# Patient Record
Sex: MALE | Race: WHITE | NOT HISPANIC OR LATINO | Employment: OTHER | URBAN - NONMETROPOLITAN AREA
[De-identification: names, ages, dates, MRNs, and addresses within clinical notes are randomized per-mention and may not be internally consistent; named-entity substitution may affect disease eponyms.]

---

## 2017-01-10 RX ORDER — DILTIAZEM HYDROCHLORIDE 180 MG/1
180 CAPSULE, COATED, EXTENDED RELEASE ORAL DAILY
Qty: 90 CAPSULE | Refills: 1 | Status: SHIPPED | OUTPATIENT
Start: 2017-01-10 | End: 2017-04-07 | Stop reason: SDUPTHER

## 2017-02-06 ENCOUNTER — OFFICE VISIT (OUTPATIENT)
Dept: FAMILY MEDICINE CLINIC | Facility: CLINIC | Age: 77
End: 2017-02-06

## 2017-02-06 VITALS
BODY MASS INDEX: 30.98 KG/M2 | OXYGEN SATURATION: 98 % | WEIGHT: 204.4 LBS | HEIGHT: 68 IN | HEART RATE: 74 BPM | DIASTOLIC BLOOD PRESSURE: 80 MMHG | TEMPERATURE: 97 F | SYSTOLIC BLOOD PRESSURE: 160 MMHG

## 2017-02-06 DIAGNOSIS — I10 ESSENTIAL HYPERTENSION: ICD-10-CM

## 2017-02-06 DIAGNOSIS — L30.9 DERMATITIS: Primary | ICD-10-CM

## 2017-02-06 PROCEDURE — 99213 OFFICE O/P EST LOW 20 MIN: CPT | Performed by: FAMILY MEDICINE

## 2017-02-06 RX ORDER — TRIAMCINOLONE ACETONIDE 5 MG/G
OINTMENT TOPICAL 2 TIMES DAILY
Qty: 1 TUBE | Refills: 0 | Status: SHIPPED | OUTPATIENT
Start: 2017-02-06 | End: 2017-02-06

## 2017-02-06 NOTE — PROGRESS NOTES
Subjective   Chief Complaint   Patient presents with   • spots on legs and hips       Xander Wallace is a 76 y.o. male who presents for spots on legs and hips   History of Present Illness:   Rash:  Rash x 3-4 days on both legs:  Had this in the past few years ago and was given nystatin-triamcinolone cream which got rid of it. He has been using that for a few days and it is now not working. It is on both legs and buttock area. It does not itch. Not painful. No discharge or drainage. He does not have any hx of IBS, celiacs, hepatitis. He has not used any new skin products, detergents, creams. No new medications.    BP:  Not checking it at home. He did take med today.  Says that he rushed to get here and was stressed about it. Rushed here from [place].    The following portions of the patient's history were reviewed and updated as appropriate:problem list, current medications, allergies, past family history, past medical history, past social history and past surgical history    Past Medical History   Diagnosis Date   • Asthma    • COPD (chronic obstructive pulmonary disease)    • Coronary artery disease involving coronary bypass graft      CABG 2012, stent 2016, stent 2005   • Emphysema of lung    • Hyperlipidemia    • Hypertension    • Seizure disorder    • Skin cancer of lip    • Sleep apnea        Social History   Substance Use Topics   • Smoking status: Former Smoker   • Smokeless tobacco: Never Used   • Alcohol use No       Medications:    Current Outpatient Prescriptions:   •  aspirin 81 MG tablet, Take 1 tablet by mouth Daily., Disp: 30 tablet, Rfl: 11  •  B Complex-Biotin-FA (SUPER B-COMPLEX) tablet, Take  by mouth., Disp: , Rfl:   •  clopidogrel (PLAVIX) 75 MG tablet, Take 75 mg by mouth daily., Disp: , Rfl:   •  diltiaZEM CD (CARDIZEM CD) 180 MG 24 hr capsule, Take 1 capsule by mouth Daily., Disp: 90 capsule, Rfl: 1  •  DULERA 200-5 MCG/ACT inhaler, , Disp: , Rfl:   •  ezetimibe-simvastatin  "(VYTORIN) 10-40 MG per tablet, Take 1 tablet by mouth Every Night., Disp: 90 tablet, Rfl: 3  •  gabapentin (NEURONTIN) 600 MG tablet, Take 600 mg by mouth 3 (three) times a day., Disp: , Rfl:   •  ipratropium (ATROVENT HFA) 17 MCG/ACT inhaler, Inhale 2 puffs 4 (four) times a day., Disp: , Rfl:   •  ipratropium (ATROVENT) 0.02 % nebulizer solution, Take 500 mcg by nebulization 4 (four) times a day., Disp: , Rfl:   •  lamoTRIgine (LaMICtal) 200 MG tablet, Take 200 mg by mouth daily., Disp: , Rfl:   •  lisinopril (PRINIVIL,ZESTRIL) 20 MG tablet, Take 20 mg by mouth daily., Disp: , Rfl:   •  nystatin-triamcinolone (MYCOLOG II) 340283-8.1 UNIT/GM-% cream, Apply 1 application topically 2 (Two) Times a Day., Disp: , Rfl:   •  Omega-3 Fatty Acids (FISH OIL) 1000 MG capsule capsule, Take  by mouth daily with breakfast., Disp: , Rfl:   •  tamsulosin (FLOMAX) 0.4 MG capsule 24 hr capsule, Take 1 capsule by mouth every night., Disp: , Rfl:   •  VENTOLIN  (90 BASE) MCG/ACT inhaler, , Disp: , Rfl:     Allergies   Allergen Reactions   • Amoxicillin    • Ceftin [Cefuroxime]    • Methylphenidate Derivatives    • Nystatin    • Sulfur    • Theophyllines        Review of Systems   Constitutional: Negative for fatigue and fever.   Respiratory: Negative for shortness of breath.    Cardiovascular: Negative for chest pain and leg swelling.   Skin: Positive for rash.   Neurological: Negative for headaches.       Objective   Visit Vitals   • /80   • Pulse 74   • Temp 97 °F (36.1 °C)   • Ht 68\" (172.7 cm)   • Wt 204 lb 6.4 oz (92.7 kg)   • SpO2 98%   • BMI 31.08 kg/m2       Physical Exam   Constitutional: He appears well-developed and well-nourished. No distress.   Pulmonary/Chest: Effort normal.   Skin: Skin is warm and dry. He is not diaphoretic.   Macular rash in clusters on bilateral legs and buttock area. No scaling. No vesicles or pustules. No discharge or drainage.    Nursing note and vitals reviewed.      Assessment/Plan "   Xander Wallace is a 76 y.o. male seen today for the followin. Dermatitis  Trial of Triamcinolone ointment.  If not resolved with topical triamcinolone, advised him to see Derm    2. Essential hypertension  Likely elevated due to being stressed; however, was elevated at last visit as well. Will have him check it at home and call if >140/90    Follow up: Return in about 3 months (around 2017) for Follow up Blood Pressure.          This document has been electronically signed by Leidy Stanford DO on 2017 2:27 PM

## 2017-02-06 NOTE — PATIENT INSTRUCTIONS
Please check your blood pressure at home 1-2 times per day. It should be < 140/90. Please call me if it is consistently >140/90.  If your rash does not resolve in 1-2 weeks, you see Dr. Gonzalez.

## 2017-02-09 ENCOUNTER — TELEPHONE (OUTPATIENT)
Dept: FAMILY MEDICINE CLINIC | Facility: CLINIC | Age: 77
End: 2017-02-09

## 2017-02-09 DIAGNOSIS — L30.9 DERMATITIS: ICD-10-CM

## 2017-02-09 NOTE — TELEPHONE ENCOUNTER
----- Message from Aida Tuttle sent at 2/9/2017  1:08 PM CST -----  Regarding: med refill  Contact: [phone]  Patient is needing a refill on tiramcinolone 0.5% cream. His wife said the last tube only lasted 3 days. They use [facility] in [place].

## 2017-04-07 RX ORDER — DILTIAZEM HYDROCHLORIDE 180 MG/1
180 CAPSULE, COATED, EXTENDED RELEASE ORAL DAILY
Qty: 90 CAPSULE | Refills: 3 | Status: SHIPPED | OUTPATIENT
Start: 2017-04-07 | End: 2018-04-16 | Stop reason: SDUPTHER

## 2017-04-10 ENCOUNTER — OFFICE VISIT (OUTPATIENT)
Dept: CARDIOLOGY | Facility: CLINIC | Age: 77
End: 2017-04-10

## 2017-04-10 VITALS
SYSTOLIC BLOOD PRESSURE: 120 MMHG | HEIGHT: 68 IN | HEART RATE: 74 BPM | BODY MASS INDEX: 30.46 KG/M2 | WEIGHT: 201 LBS | DIASTOLIC BLOOD PRESSURE: 70 MMHG

## 2017-04-10 DIAGNOSIS — I25.708 CORONARY ARTERY DISEASE INVOLVING CORONARY BYPASS GRAFT OF NATIVE HEART WITH OTHER FORMS OF ANGINA PECTORIS (HCC): ICD-10-CM

## 2017-04-10 DIAGNOSIS — I73.9 PERIPHERAL ARTERIAL DISEASE (HCC): Primary | ICD-10-CM

## 2017-04-10 DIAGNOSIS — E78.2 MIXED HYPERLIPIDEMIA: ICD-10-CM

## 2017-04-10 DIAGNOSIS — I10 ESSENTIAL HYPERTENSION: ICD-10-CM

## 2017-04-10 PROCEDURE — 99214 OFFICE O/P EST MOD 30 MIN: CPT | Performed by: INTERNAL MEDICINE

## 2017-04-10 NOTE — PROGRESS NOTES
[facility] Cardiology  OFFICE NOTE    Xander Wallace  76 y.o. male    04/10/2017  1. Peripheral arterial disease    2. Coronary artery disease involving coronary bypass graft of native heart with other forms of angina pectoris    3. Essential hypertension    4. Mixed hyperlipidemia        Chief complaint -follow-up CAD      History of present Illness- 76 yr old gentleman with history of coronary disease prior CABG in the past and it has history of peripheral vascular disease with total occlusion of the SFA.  He has claudication pain but does not want to do any PT at this time and he will continue to walk as much as he can.  He denies any chest pain.  He has shortness of breath secondary to COPD and is seeing a pulmonologist in [place].  He is on multiple inhalers.  He denies any TIA symptoms of GI symptoms.              Allergies   Allergen Reactions   • Amoxicillin    • Ceftin [Cefuroxime]    • Methylphenidate Derivatives    • Nystatin    • Sulfur    • Theophyllines          Past Medical History:   Diagnosis Date   • Asthma    • COPD (chronic obstructive pulmonary disease)    • Coronary artery disease involving coronary bypass graft     CABG 2012, stent 2016, stent 2005   • Emphysema of lung    • Hyperlipidemia    • Hypertension    • Seizure disorder    • Skin cancer of lip    • Sleep apnea          Past Surgical History:   Procedure Laterality Date   • APPENDECTOMY     • CARDIAC SURGERY      CABG 2012   • CAROTID STENT     • COLON SURGERY     • CORONARY ARTERY BYPASS GRAFT     • HEMORRHOIDECTOMY     • HERNIA REPAIR           Family History   Problem Relation Age of Onset   • Cancer Father          Social History     Social History   • Marital status:      Spouse name: N/A   • Number of children: N/A   • Years of education: N/A     Occupational History   • Not on file.     Social History Main Topics   • Smoking status: Former Smoker   • Smokeless tobacco: Never Used   •  Alcohol use No   • Drug use: No   • Sexual activity: Defer     Other Topics Concern   • Not on file     Social History Narrative         Current Outpatient Prescriptions   Medication Sig Dispense Refill   • B Complex-Biotin-FA (SUPER B-COMPLEX) tablet Take  by mouth.     • clopidogrel (PLAVIX) 75 MG tablet Take 75 mg by mouth daily.     • diltiaZEM CD (CARDIZEM CD) 180 MG 24 hr capsule Take 1 capsule by mouth Daily. 90 capsule 3   • DULERA 200-5 MCG/ACT inhaler      • ezetimibe-simvastatin (VYTORIN) 10-40 MG per tablet Take 1 tablet by mouth Every Night. 90 tablet 3   • gabapentin (NEURONTIN) 600 MG tablet Take 600 mg by mouth 3 (three) times a day.     • ipratropium (ATROVENT HFA) 17 MCG/ACT inhaler Inhale 2 puffs 4 (four) times a day.     • ipratropium (ATROVENT) 0.02 % nebulizer solution Take 500 mcg by nebulization 4 (four) times a day.     • lamoTRIgine (LaMICtal) 200 MG tablet Take 200 mg by mouth daily.     • lisinopril (PRINIVIL,ZESTRIL) 20 MG tablet Take 20 mg by mouth daily.     • Omega-3 Fatty Acids (FISH OIL) 1000 MG capsule capsule Take  by mouth daily with breakfast.     • tamsulosin (FLOMAX) 0.4 MG capsule 24 hr capsule Take 1 capsule by mouth every night.     • triamcinolone (KENALOG) 0.1 % ointment Apply  topically 2 (Two) Times a Day. For up to 2 weeks 1 tube 0   • VENTOLIN  (90 BASE) MCG/ACT inhaler        No current facility-administered medications for this visit.          Review of Systems     Constitution: Denies any fatigue, fever or chills    HENT: Denies any headache, has hearing impairment and uses hearing aids    Eyes: Denies any blurring of vision, or photophobia     Cardivascular - As per history of present illness     Respiratory system-history of COPD and emphysema     Endocrine: Hyperlipidemia    Musculoskeletal:   history of arthritis with musculoskeletal problems    Gastrointestinal: No nausea, vomiting, or melena    Genitourinary: No dysuria or hematuria    Neurological:  "  No history of seizure disorder, stroke, memory problems    Psychiatric/Behavioral:        No history of depression,  No history of bipolar disorder or schizophrenia     Hematological- easy bruising due to dual antiplatelet therapy            OBJECTIVE    /70  Pulse 74  Ht 68\" (172.7 cm)  Wt 201 lb (91.2 kg)  BMI 30.56 kg/m2      Physical Exam     Constitutional: is oriented to person, place, and time.     Skin-warm and dry, diffuse bruising of the hands    Well developed and nourished in no acute distress      Head: Normocephalic and atraumatic.     Eyes: Pupils are equal, round, and reactive to light.     Neck: Neck supple. No bruit in the carotids, no elevation of JVD    Cardiovascular: [place] in the fifth intercostal space   Regular rate, and  Rhythm,    S1 greater than S2, no S3 or S4, no gallop     Pulmonary/Chest:   Air  Entry is equal on both sides  Bilateral diffuse scattered rhonchi      Abdominal: Soft.  No hepatosplenomegaly, bowel sounds are present    Musculoskeletal: No kyphoscoliosis, no significant thickening of the joints    Neurological: is alert and oriented to person, place, and time.    cranial nerve are intact .   No motor or sensory deficit    Extremities-no edema, pulses are weak below the femoral artery on both sides      Psychiatric: He has a normal mood and affect.                  His behavior is normal.           Procedures      Lab Results   Component Value Date    WBC 6.1 08/23/2016    HGB 10.4 (L) 08/23/2016    HCT 31.2 (L) 08/23/2016    MCV 86.0 08/23/2016     08/23/2016     Lab Results   Component Value Date    GLUCOSE 93 08/23/2016    BUN 29 (H) 08/23/2016    CREATININE 1.5 (H) 08/23/2016    CO2 25 08/23/2016    CALCIUM 9.1 08/23/2016    ALBUMIN 4.1 08/23/2016    AST 54 08/23/2016    ALT 28 08/23/2016     No results found for: CHOL  Lab Results   Component Value Date    TRIG 63 05/04/2016     Lab Results   Component Value Date    HDL 51 (L) 05/04/2016     Lab " Results   Component Value Date    LDLCALC 50 05/04/2016     No results found for: LDL  No results found for: HDLLDLRATIO  No components found for: CHOLHDL  Lab Results   Component Value Date    HGBA1C 5.3 05/04/2016     Lab Results   Component Value Date    TSH 2.87 02/23/2016                  A/P    CAD status post CABG in the past, doing well no chest pain stopped his aspirin and continue the Plavix as he is bruising easily.  He is on Cardizem CD for blood pressure as he cannot tolerate beta blockers due to wheezing.    Peripheral vascular disease-status post occlusion of the SFA has no critical limb ischemia, still walking but does have claudication I asked him to walk more.    Hypertension well controlled with lisinopril and Cardizem CD and takes Vytorin for hyperlipidemia.    COPD with wheezing on Atrovent and Dulera .  He will have his labs checked in August by his family doctor and follow-up in 8 months            This document has been electronically signed by Branden Corral MD on April 10, 2017 8:25 AM       EMR Dragon/Transcription disclaimer:   Some of this note may be an electronic transcription/translation of spoken language to printed text. The electronic translation of spoken language may permit erroneous, or at times, nonsensical words or phrases to be inadvertently transcribed; Although I have reviewed the note for such errors, some may still exist.

## 2017-05-01 RX ORDER — LISINOPRIL 20 MG/1
20 TABLET ORAL DAILY
Qty: 90 TABLET | Refills: 1 | Status: SHIPPED | OUTPATIENT
Start: 2017-05-01 | End: 2017-10-25 | Stop reason: SDUPTHER

## 2017-05-01 RX ORDER — CLOPIDOGREL BISULFATE 75 MG/1
75 TABLET ORAL DAILY
Qty: 90 TABLET | Refills: 1 | Status: SHIPPED | OUTPATIENT
Start: 2017-05-01 | End: 2017-11-13 | Stop reason: SDUPTHER

## 2017-05-02 ENCOUNTER — TELEPHONE (OUTPATIENT)
Dept: CARDIOLOGY | Facility: CLINIC | Age: 77
End: 2017-05-02

## 2017-05-05 ENCOUNTER — TELEPHONE (OUTPATIENT)
Dept: FAMILY MEDICINE CLINIC | Facility: CLINIC | Age: 77
End: 2017-05-05

## 2017-05-05 RX ORDER — ALBUTEROL SULFATE 90 UG/1
1 AEROSOL, METERED RESPIRATORY (INHALATION) EVERY 4 HOURS PRN
Qty: 1 INHALER | Refills: 11 | Status: SHIPPED | OUTPATIENT
Start: 2017-05-05 | End: 2017-06-04

## 2017-06-19 ENCOUNTER — OFFICE VISIT (OUTPATIENT)
Dept: FAMILY MEDICINE CLINIC | Facility: CLINIC | Age: 77
End: 2017-06-19

## 2017-06-19 VITALS
DIASTOLIC BLOOD PRESSURE: 56 MMHG | SYSTOLIC BLOOD PRESSURE: 148 MMHG | BODY MASS INDEX: 31.73 KG/M2 | OXYGEN SATURATION: 95 % | HEART RATE: 67 BPM | HEIGHT: 66 IN | WEIGHT: 197.44 LBS

## 2017-06-19 DIAGNOSIS — I25.810 CORONARY ARTERY DISEASE INVOLVING CORONARY BYPASS GRAFT OF NATIVE HEART WITHOUT ANGINA PECTORIS: ICD-10-CM

## 2017-06-19 DIAGNOSIS — E78.2 MIXED HYPERLIPIDEMIA: ICD-10-CM

## 2017-06-19 DIAGNOSIS — Z23 NEED FOR STREPTOCOCCUS PNEUMONIAE VACCINATION: ICD-10-CM

## 2017-06-19 DIAGNOSIS — I10 ESSENTIAL HYPERTENSION: Primary | ICD-10-CM

## 2017-06-19 DIAGNOSIS — J41.8 MIXED SIMPLE AND MUCOPURULENT CHRONIC BRONCHITIS (HCC): ICD-10-CM

## 2017-06-19 PROCEDURE — G0009 ADMIN PNEUMOCOCCAL VACCINE: HCPCS | Performed by: FAMILY MEDICINE

## 2017-06-19 PROCEDURE — 90670 PCV13 VACCINE IM: CPT | Performed by: FAMILY MEDICINE

## 2017-06-19 PROCEDURE — 99214 OFFICE O/P EST MOD 30 MIN: CPT | Performed by: FAMILY MEDICINE

## 2017-06-19 RX ORDER — ALBUTEROL SULFATE 90 UG/1
2 AEROSOL, METERED RESPIRATORY (INHALATION) EVERY 4 HOURS PRN
Qty: 3 INHALER | Refills: 3 | Status: SHIPPED | OUTPATIENT
Start: 2017-06-19 | End: 2018-11-05 | Stop reason: SDUPTHER

## 2017-06-19 RX ORDER — TRIAMCINOLONE ACETONIDE 1 MG/G
CREAM TOPICAL
COMMUNITY
Start: 2017-03-29 | End: 2017-06-19

## 2017-06-19 NOTE — PROGRESS NOTES
Subjective   Chief Complaint   Patient presents with   • Hypertension   • Hyperlipidemia   • COPD   • Follow-up     6 Month        Xander Wallace is a 77 y.o. male who presents for Hypertension; Hyperlipidemia; COPD; and Follow-up (6 Month )   Hypertension   This is a chronic problem. The current episode started more than 1 year ago. The problem is controlled. Pertinent negatives include no chest pain, headaches, palpitations, peripheral edema or shortness of breath. There are no associated agents to hypertension. The current treatment provides significant improvement. There are no compliance problems.  Hypertensive end-organ damage includes CAD/MI.   BP elevated last time, but improved today and at cardiologist. He is active outside. Uses his albuterol prn. dulera was stopped. Denies sob, lagos, orthopnea, edema.  Dr. Christie checks his PSA  He is due for blood work and prevnar    The following portions of the patient's history were reviewed and updated as appropriate:problem list, current medications, allergies, past family history, past medical history, past social history and past surgical history    Past Medical History:   Diagnosis Date   • Asthma    • COPD (chronic obstructive pulmonary disease)    • Coronary artery disease involving coronary bypass graft     CABG 2012, stent 2016, stent 2005   • Emphysema of lung    • Hyperlipidemia    • Hypertension    • Seizure disorder    • Skin cancer of lip    • Sleep apnea        Social History   Substance Use Topics   • Smoking status: Former Smoker   • Smokeless tobacco: Never Used   • Alcohol use No       Medications:    Current Outpatient Prescriptions:   •  B Complex-Biotin-FA (SUPER B-COMPLEX) tablet, Take  by mouth., Disp: , Rfl:   •  clopidogrel (PLAVIX) 75 MG tablet, Take 1 tablet by mouth Daily., Disp: 90 tablet, Rfl: 1  •  diltiaZEM CD (CARDIZEM CD) 180 MG 24 hr capsule, Take 1 capsule by mouth Daily., Disp: 90 capsule, Rfl: 3  •  DULERA 200-5 MCG/ACT  inhaler, , Disp: , Rfl: --no longer taking this per patient  •  ezetimibe-simvastatin (VYTORIN) 10-40 MG per tablet, Take 1 tablet by mouth Every Night., Disp: 90 tablet, Rfl: 3  •  gabapentin (NEURONTIN) 600 MG tablet, Take 600 mg by mouth 3 (three) times a day., Disp: , Rfl:   •  ipratropium (ATROVENT HFA) 17 MCG/ACT inhaler, Inhale 2 puffs 4 (four) times a day., Disp: , Rfl:   •  ipratropium (ATROVENT) 0.02 % nebulizer solution, Take 500 mcg by nebulization 4 (four) times a day., Disp: , Rfl:   •  lamoTRIgine (LaMICtal) 200 MG tablet, Take 200 mg by mouth daily., Disp: , Rfl:   •  lisinopril (PRINIVIL,ZESTRIL) 20 MG tablet, Take 1 tablet by mouth Daily., Disp: 90 tablet, Rfl: 1  •  Omega-3 Fatty Acids (FISH OIL) 1000 MG capsule capsule, Take  by mouth daily with breakfast., Disp: , Rfl:   •  tamsulosin (FLOMAX) 0.4 MG capsule 24 hr capsule, Take 1 capsule by mouth every night., Disp: , Rfl:   •  triamcinolone (KENALOG) 0.1 % ointment, Apply  topically 2 (Two) Times a Day. For up to 2 weeks, Disp: 1 tube, Rfl: 0    Allergies   Allergen Reactions   • Amoxicillin    • Ceftin [Cefuroxime]    • Methylphenidate Derivatives    • Nystatin    • Sulfur    • Theophyllines        Review of Systems   Constitutional: Negative for fatigue, fever and unexpected weight change.   HENT: Negative for rhinorrhea.    Eyes: Negative for visual disturbance.   Respiratory: Negative for cough and shortness of breath.    Cardiovascular: Negative for chest pain, palpitations and leg swelling.   Gastrointestinal: Negative for abdominal pain, blood in stool, constipation and diarrhea.   Endocrine: Negative for polydipsia, polyphagia and polyuria.   Genitourinary: Negative for difficulty urinating.   Musculoskeletal: Negative for arthralgias and back pain.   Skin: Negative for rash.   Neurological: Negative for dizziness and headaches.   Psychiatric/Behavioral: Negative for sleep disturbance. The patient is not nervous/anxious.   "      Objective   Visit Vitals   • /56 (BP Location: Left arm, Patient Position: Sitting, Cuff Size: Adult)   • Pulse 67   • Ht 66\" (167.6 cm)   • Wt 197 lb 7 oz (89.6 kg)   • SpO2 95%   • BMI 31.87 kg/m2       Physical Exam   Constitutional: He is oriented to person, place, and time. He appears well-developed and well-nourished. No distress.   HENT:   Head: Normocephalic and atraumatic.   Nose: Nose normal.   Eyes: Conjunctivae and EOM are normal.   Neck: Normal range of motion.   Cardiovascular: Normal rate, regular rhythm and normal heart sounds.  Exam reveals no gallop and no friction rub.    No murmur heard.  Pulmonary/Chest: Effort normal and breath sounds normal. No respiratory distress. He has no wheezes. He has no rales.   Musculoskeletal: Normal range of motion. He exhibits no edema.   Neurological: He is alert and oriented to person, place, and time. No cranial nerve deficit.   Skin: Skin is warm and dry. He is not diaphoretic.   Tan skin   Psychiatric: He has a normal mood and affect. His behavior is normal.   Nursing note and vitals reviewed.      Assessment/Plan   Xander Wallace is a 77 y.o. male seen today for the followin. Essential hypertension  Improved. Continue current meds    - Comprehensive Metabolic Panel    2. Mixed hyperlipidemia    - Lipid Panel  - Comprehensive Metabolic Panel    3. Coronary artery disease involving coronary bypass graft of native heart without angina pectoris  Stable. follwed by cards. On plavix. No symptoms    - CBC & Differential    4. Mixed simple and mucopurulent chronic bronchitis  Doing ok off dulera. Discussed if he develops increased wheezing, sob, lagos, to let me or pulm know right away. He has pulm follow up next months    - albuterol (PROVENTIL HFA;VENTOLIN HFA) 108 (90 BASE) MCG/ACT inhaler; Inhale 2 puffs Every 4 (Four) Hours As Needed for Wheezing.  Dispense: 3 inhaler; Refill: 3    5. Need for Streptococcus pneumoniae vaccination    - " Pneumococcal Conjugate Vaccine 13-Valent All    Counseled on wearing sunscreen outside and on adequate hydration    Follow up: Return in about 3 months (around 9/19/2017) for Recheck.          This document has been electronically signed by Leidy Stanford DO on June 19, 2017 9:00 AM

## 2017-07-06 LAB
ALBUMIN SERPL-MCNC: 3.8 G/DL (ref 3.4–4.8)
ALBUMIN/GLOB SERPL: 1.6 G/DL (ref 1.1–1.8)
ALP SERPL-CCNC: 92 U/L (ref 38–126)
ALT SERPL W P-5'-P-CCNC: 32 U/L (ref 21–72)
ANION GAP SERPL CALCULATED.3IONS-SCNC: 11 MMOL/L (ref 5–15)
ARTICHOKE IGE QN: 48 MG/DL (ref 1–129)
AST SERPL-CCNC: 18 U/L (ref 17–59)
BASOPHILS # BLD AUTO: 0.01 10*3/MM3 (ref 0–0.2)
BASOPHILS NFR BLD AUTO: 0.2 % (ref 0–2)
BILIRUB SERPL-MCNC: 0.4 MG/DL (ref 0.2–1.3)
BUN BLD-MCNC: 26 MG/DL (ref 7–21)
BUN/CREAT SERPL: 23.9 (ref 7–25)
CALCIUM SPEC-SCNC: 8.7 MG/DL (ref 8.4–10.2)
CHLORIDE SERPL-SCNC: 105 MMOL/L (ref 95–110)
CHOLEST SERPL-MCNC: 113 MG/DL (ref 0–199)
CO2 SERPL-SCNC: 24 MMOL/L (ref 22–31)
CREAT BLD-MCNC: 1.09 MG/DL (ref 0.7–1.3)
DEPRECATED RDW RBC AUTO: 42.4 FL (ref 35.1–43.9)
EOSINOPHIL # BLD AUTO: 0.19 10*3/MM3 (ref 0–0.7)
EOSINOPHIL NFR BLD AUTO: 3.1 % (ref 0–7)
ERYTHROCYTE [DISTWIDTH] IN BLOOD BY AUTOMATED COUNT: 13.1 % (ref 11.5–14.5)
GFR SERPL CREATININE-BSD FRML MDRD: 65 ML/MIN/1.73 (ref 42–98)
GLOBULIN UR ELPH-MCNC: 2.4 GM/DL (ref 2.3–3.5)
GLUCOSE BLD-MCNC: 93 MG/DL (ref 60–100)
HCT VFR BLD AUTO: 36.5 % (ref 39–49)
HDLC SERPL-MCNC: 58 MG/DL (ref 60–200)
HGB BLD-MCNC: 12.1 G/DL (ref 13.7–17.3)
IMM GRANULOCYTES # BLD: 0.03 10*3/MM3 (ref 0–0.02)
IMM GRANULOCYTES NFR BLD: 0.5 % (ref 0–0.5)
LDLC/HDLC SERPL: 0.75 {RATIO} (ref 0–3.55)
LYMPHOCYTES # BLD AUTO: 1.22 10*3/MM3 (ref 0.6–4.2)
LYMPHOCYTES NFR BLD AUTO: 19.7 % (ref 10–50)
MCH RBC QN AUTO: 29.4 PG (ref 26.5–34)
MCHC RBC AUTO-ENTMCNC: 33.2 G/DL (ref 31.5–36.3)
MCV RBC AUTO: 88.6 FL (ref 80–98)
MONOCYTES # BLD AUTO: 0.81 10*3/MM3 (ref 0–0.9)
MONOCYTES NFR BLD AUTO: 13.1 % (ref 0–12)
NEUTROPHILS # BLD AUTO: 3.93 10*3/MM3 (ref 2–8.6)
NEUTROPHILS NFR BLD AUTO: 63.4 % (ref 37–80)
PLATELET # BLD AUTO: 203 10*3/MM3 (ref 150–450)
PMV BLD AUTO: 9.9 FL (ref 8–12)
POTASSIUM BLD-SCNC: 5.2 MMOL/L (ref 3.5–5.1)
PROT SERPL-MCNC: 6.2 G/DL (ref 6.3–8.6)
RBC # BLD AUTO: 4.12 10*6/MM3 (ref 4.37–5.74)
SODIUM BLD-SCNC: 140 MMOL/L (ref 137–145)
TRIGL SERPL-MCNC: 57 MG/DL (ref 20–199)
WBC NRBC COR # BLD: 6.19 10*3/MM3 (ref 3.2–9.8)

## 2017-07-06 PROCEDURE — 85025 COMPLETE CBC W/AUTO DIFF WBC: CPT | Performed by: FAMILY MEDICINE

## 2017-07-06 PROCEDURE — 80053 COMPREHEN METABOLIC PANEL: CPT | Performed by: FAMILY MEDICINE

## 2017-07-06 PROCEDURE — 80061 LIPID PANEL: CPT | Performed by: FAMILY MEDICINE

## 2017-07-10 ENCOUNTER — TELEPHONE (OUTPATIENT)
Dept: FAMILY MEDICINE CLINIC | Facility: CLINIC | Age: 77
End: 2017-07-10

## 2017-07-10 NOTE — TELEPHONE ENCOUNTER
Pr Dr. Stanford, Mr. Wallace's wife has been called with his recent lab results & recommendations due to him not being able to hear on the phone very well.  Continue his current medications and follow-up as planned or sooner if any problems.        ----- Message from Leidy Stanford DO sent at 7/7/2017  5:28 PM CDT -----  Please let him know that his kidney function has improved, but his potassium is a little high. He should lower potassium in his diet (limit spinach/leafy greens, bananas, avocado, squash, sweet potato, etc). His anemia is also getting better.

## 2017-09-05 RX ORDER — EZETIMIBE AND SIMVASTATIN 10; 40 MG/1; MG/1
1 TABLET ORAL NIGHTLY
Qty: 90 TABLET | Refills: 2 | Status: SHIPPED | OUTPATIENT
Start: 2017-09-05 | End: 2017-12-11 | Stop reason: SDUPTHER

## 2017-09-05 NOTE — TELEPHONE ENCOUNTER
Refill Request came in by fax from   [facility] - [place], [place] - [address] - [phone]  - [phone] FX  [address]  [address] [place] [id number]  Phone: [phone] Fax: [phone]   Refill sent to pharmacy via e-scribe.

## 2017-09-25 ENCOUNTER — OFFICE VISIT (OUTPATIENT)
Dept: FAMILY MEDICINE CLINIC | Facility: CLINIC | Age: 77
End: 2017-09-25

## 2017-09-25 VITALS
SYSTOLIC BLOOD PRESSURE: 122 MMHG | WEIGHT: 191.1 LBS | HEART RATE: 65 BPM | DIASTOLIC BLOOD PRESSURE: 76 MMHG | HEIGHT: 66 IN | OXYGEN SATURATION: 98 % | BODY MASS INDEX: 30.71 KG/M2

## 2017-09-25 DIAGNOSIS — Z23 NEED FOR INFLUENZA VACCINATION: ICD-10-CM

## 2017-09-25 DIAGNOSIS — C44.00 SKIN CANCER OF LIP: ICD-10-CM

## 2017-09-25 DIAGNOSIS — I10 ESSENTIAL HYPERTENSION: Primary | ICD-10-CM

## 2017-09-25 DIAGNOSIS — E87.5 HYPERKALEMIA: ICD-10-CM

## 2017-09-25 DIAGNOSIS — J41.8 MIXED SIMPLE AND MUCOPURULENT CHRONIC BRONCHITIS (HCC): ICD-10-CM

## 2017-09-25 PROCEDURE — G0008 ADMIN INFLUENZA VIRUS VAC: HCPCS | Performed by: FAMILY MEDICINE

## 2017-09-25 PROCEDURE — 99214 OFFICE O/P EST MOD 30 MIN: CPT | Performed by: FAMILY MEDICINE

## 2017-09-25 PROCEDURE — 90662 IIV NO PRSV INCREASED AG IM: CPT | Performed by: FAMILY MEDICINE

## 2017-09-25 NOTE — PROGRESS NOTES
Subjective   Chief Complaint   Patient presents with   • Follow-up     3 mo follow up       Xander Wallace is a 77 y.o. male who presents for Follow-up (3 mo follow up)   Hypertension   This is a chronic problem. The problem is controlled. Pertinent negatives include no chest pain, headaches, orthopnea, palpitations, peripheral edema, PND or shortness of breath. There are no associated agents to hypertension. Past treatments include ACE inhibitors and calcium channel blockers. There are no compliance problems.    potassium elevated last visit and he has been watching potassium intake.    Has appt today with his dermatologist, Dr. Gonzalez to get biopsy results from lesion on his leg and also to get a new spot on his lip biopsied. Has hx of skin cancer of lip.     pulm put him back on Dulera. Breathing is much better. He is now more active    PSA recently checked by fellows, and is wnl    Also followed by his cardiologist, Dr. Otero every 6 months for CAD, PVD, hyperlipidemia. Last lipids were good    The following portions of the patient's history were reviewed and updated as appropriate:problem list, current medications, allergies, past family history, past medical history, past social history and past surgical history    Past Medical History:   Diagnosis Date   • Aortic stenosis    • Asthma    • COPD (chronic obstructive pulmonary disease)    • Coronary artery disease involving coronary bypass graft     CABG 2012, stent 2016, stent 2005   • Emphysema of lung    • Hyperlipidemia    • Hypertension    • PVD (peripheral vascular disease)    • Seizure disorder    • Skin cancer of lip    • Sleep apnea        Social History   Substance Use Topics   • Smoking status: Former Smoker   • Smokeless tobacco: Never Used   • Alcohol use No     History   Sexual Activity   • Sexual activity: Defer       Medications:  Outpatient Medications Prior to Visit   Medication Sig Dispense Refill   • albuterol (PROVENTIL HFA;VENTOLIN  HFA) 108 (90 BASE) MCG/ACT inhaler Inhale 2 puffs Every 4 (Four) Hours As Needed for Wheezing. 3 inhaler 3   • B Complex-Biotin-FA (SUPER B-COMPLEX) tablet Take  by mouth.     • clopidogrel (PLAVIX) 75 MG tablet Take 1 tablet by mouth Daily. 90 tablet 1   • diltiaZEM CD (CARDIZEM CD) 180 MG 24 hr capsule Take 1 capsule by mouth Daily. 90 capsule 3   • ezetimibe-simvastatin (VYTORIN) 10-40 MG per tablet Take 1 tablet by mouth Every Night. 90 tablet 2   • gabapentin (NEURONTIN) 600 MG tablet Take 600 mg by mouth 3 (three) times a day.     • ipratropium (ATROVENT HFA) 17 MCG/ACT inhaler Inhale 2 puffs 4 (four) times a day.     • ipratropium (ATROVENT) 0.02 % nebulizer solution Take 500 mcg by nebulization 4 (four) times a day.     • lamoTRIgine (LaMICtal) 200 MG tablet Take 200 mg by mouth daily.     • lisinopril (PRINIVIL,ZESTRIL) 20 MG tablet Take 1 tablet by mouth Daily. 90 tablet 1   • Omega-3 Fatty Acids (FISH OIL) 1000 MG capsule capsule Take  by mouth daily with breakfast.     • tamsulosin (FLOMAX) 0.4 MG capsule 24 hr capsule Take 1 capsule by mouth every night.     • triamcinolone (KENALOG) 0.1 % ointment Apply  topically 2 (Two) Times a Day. For up to 2 weeks 1 tube 0     No facility-administered medications prior to visit.        Allergies   Allergen Reactions   • Amoxicillin    • Ceftin [Cefuroxime]    • Methylphenidate Derivatives    • Nystatin    • Sulfur    • Theophyllines        Review of Systems   Constitutional: Negative for fatigue, fever and unexpected weight change.   HENT: Negative for rhinorrhea.    Eyes: Negative for visual disturbance.   Respiratory: Negative for cough, shortness of breath and wheezing.    Cardiovascular: Negative for chest pain, palpitations, orthopnea, leg swelling and PND.   Gastrointestinal: Negative for abdominal pain, blood in stool, constipation and diarrhea.   Endocrine: Negative for polydipsia, polyphagia and polyuria.   Genitourinary: Negative for difficulty  "urinating.   Musculoskeletal: Negative for arthralgias and back pain.   Skin: Negative for rash.   Neurological: Negative for dizziness, syncope and headaches.   Psychiatric/Behavioral: Negative for sleep disturbance. The patient is not nervous/anxious.        Objective   Visit Vitals   • /76 (BP Location: Left arm, Patient Position: Sitting, Cuff Size: Large Adult)   • Pulse 65   • Ht 66\" (167.6 cm)   • Wt 191 lb 1.6 oz (86.7 kg)   • SpO2 98%   • BMI 30.84 kg/m2       Physical Exam   Constitutional: He is oriented to person, place, and time. He appears well-developed and well-nourished. No distress.   HENT:   Head: Normocephalic and atraumatic.   Nose: Nose normal.   Eyes: Conjunctivae and EOM are normal.   Neck: Normal range of motion.   Cardiovascular: Normal rate and regular rhythm.  Exam reveals no gallop and no friction rub.    Murmur heard.   Systolic murmur is present   Pulmonary/Chest: Effort normal and breath sounds normal. No respiratory distress. He has no wheezes. He has no rales.   Musculoskeletal: Normal range of motion. He exhibits no edema.   Neurological: He is alert and oriented to person, place, and time. No cranial nerve deficit.   Skin: Skin is warm and dry. He is not diaphoretic.   Hyperpigmented lesion on lip   Psychiatric: He has a normal mood and affect. His behavior is normal.   Nursing note and vitals reviewed.      Assessment/Plan   Xander Wallace is a 77 y.o. male seen today for the followin. Essential hypertension  Controlled. Continue current    2. Hyperkalemia  Continue to watch potassium in diet. He will have labs next week with Dr. Anderson and will have him fax them to me    3. Mixed simple and mucopurulent chronic bronchitis  Back on dulera per pulm    4. Need for influenza vaccination    - Flu Vaccine High Dose PF 65YR+    5. Skin cancer of lip  Will follow up with derm today    Follow up: No Follow-up on file.          This document has been electronically " signed by Leidy Stanford DO on September 25, 2017 8:53 AM

## 2017-10-25 RX ORDER — LISINOPRIL 20 MG/1
20 TABLET ORAL DAILY
Qty: 90 TABLET | Refills: 2 | Status: SHIPPED | OUTPATIENT
Start: 2017-10-25 | End: 2018-07-31 | Stop reason: SDUPTHER

## 2017-11-13 RX ORDER — CLOPIDOGREL BISULFATE 75 MG/1
75 TABLET ORAL DAILY
Qty: 90 TABLET | Refills: 2 | Status: SHIPPED | OUTPATIENT
Start: 2017-11-13 | End: 2018-05-14 | Stop reason: SDUPTHER

## 2017-11-13 NOTE — TELEPHONE ENCOUNTER
Refill Request came in by fax from   [facility] - [place], [place] - [address] [facility] - [phone]  - [phone] FX  [address]  [address] [place] [id number]  Phone: [phone] Fax: [phone]   Refill sent to pharmacy via e-scribe.

## 2017-12-11 ENCOUNTER — OFFICE VISIT (OUTPATIENT)
Dept: CARDIOLOGY | Facility: CLINIC | Age: 77
End: 2017-12-11

## 2017-12-11 VITALS
HEART RATE: 75 BPM | DIASTOLIC BLOOD PRESSURE: 70 MMHG | SYSTOLIC BLOOD PRESSURE: 140 MMHG | HEIGHT: 67 IN | WEIGHT: 192 LBS | BODY MASS INDEX: 30.13 KG/M2

## 2017-12-11 DIAGNOSIS — I73.9 PERIPHERAL ARTERIAL DISEASE (HCC): ICD-10-CM

## 2017-12-11 DIAGNOSIS — E78.2 MIXED HYPERLIPIDEMIA: ICD-10-CM

## 2017-12-11 DIAGNOSIS — I10 ESSENTIAL HYPERTENSION: ICD-10-CM

## 2017-12-11 DIAGNOSIS — I25.810 CORONARY ARTERY DISEASE INVOLVING CORONARY BYPASS GRAFT OF NATIVE HEART WITHOUT ANGINA PECTORIS: Primary | ICD-10-CM

## 2017-12-11 PROCEDURE — 99214 OFFICE O/P EST MOD 30 MIN: CPT | Performed by: INTERNAL MEDICINE

## 2017-12-11 RX ORDER — EZETIMIBE AND SIMVASTATIN 10; 40 MG/1; MG/1
1 TABLET ORAL NIGHTLY
Qty: 90 TABLET | Refills: 4 | Status: SHIPPED | OUTPATIENT
Start: 2017-12-11 | End: 2018-12-18

## 2017-12-11 NOTE — PROGRESS NOTES
[facility]  OFFICE NOTE    Xander Wallace  77 y.o. male    12/11/2017  1. Coronary artery disease involving coronary bypass graft of native heart without angina pectoris    2. Essential hypertension    3. Mixed hyperlipidemia    4. Peripheral arterial disease        Chief complaint -follow-up CAD      History of present Illness- 77 yr old gentleman with history of coronary disease prior CABG in the past and it has history of peripheral vascular disease with total occlusion of the SFA.  He has claudication pain but does not want to do any PTA at this time and he will continue to walk as much as he can.  He denies any chest pain.  He has shortness of breath secondary to COPD and is seeing a pulmonologist in [place].  He is on multiple inhalers.  He denies any TIA symptoms of GI symptoms.He stays active as much as he can and he had fallen couple of times in the last 6 months and he gets around with a walking stick.  He denies any CNS symptoms.              Allergies   Allergen Reactions   • Amoxicillin    • Ceftin [Cefuroxime]    • Methylphenidate Derivatives    • Nystatin    • Sulfur    • Theophyllines          Past Medical History:   Diagnosis Date   • Aortic stenosis    • Asthma    • COPD (chronic obstructive pulmonary disease)    • Coronary artery disease involving coronary bypass graft     CABG 2012, stent 2016, stent 2005   • Emphysema of lung    • Hyperlipidemia    • Hypertension    • PVD (peripheral vascular disease)    • Seizure disorder    • Skin cancer of lip    • Sleep apnea          Past Surgical History:   Procedure Laterality Date   • APPENDECTOMY     • CARDIAC SURGERY      CABG 2012   • CAROTID STENT     • COLON SURGERY     • CORONARY ARTERY BYPASS GRAFT     • HEMORRHOIDECTOMY     • HERNIA REPAIR           Family History   Problem Relation Age of Onset   • Cancer Father          Social History     Social History   • Marital status:      Spouse name: N/A    • Number of children: N/A   • Years of education: N/A     Occupational History   • Not on file.     Social History Main Topics   • Smoking status: Former Smoker   • Smokeless tobacco: Never Used   • Alcohol use No   • Drug use: No   • Sexual activity: Defer     Other Topics Concern   • Not on file     Social History Narrative         Current Outpatient Prescriptions   Medication Sig Dispense Refill   • albuterol (PROVENTIL HFA;VENTOLIN HFA) 108 (90 BASE) MCG/ACT inhaler Inhale 2 puffs Every 4 (Four) Hours As Needed for Wheezing. 3 inhaler 3   • B Complex-Biotin-FA (SUPER B-COMPLEX) tablet Take  by mouth.     • clopidogrel (PLAVIX) 75 MG tablet Take 1 tablet by mouth Daily. 90 tablet 2   • diltiaZEM CD (CARDIZEM CD) 180 MG 24 hr capsule Take 1 capsule by mouth Daily. 90 capsule 3   • ezetimibe-simvastatin (VYTORIN) 10-40 MG per tablet Take 1 tablet by mouth Every Night. 90 tablet 4   • gabapentin (NEURONTIN) 600 MG tablet Take 600 mg by mouth 3 (three) times a day.     • ipratropium (ATROVENT HFA) 17 MCG/ACT inhaler Inhale 2 puffs 4 (four) times a day.     • ipratropium (ATROVENT) 0.02 % nebulizer solution Take 500 mcg by nebulization 4 (four) times a day.     • lamoTRIgine (LaMICtal) 200 MG tablet Take 200 mg by mouth daily.     • lisinopril (PRINIVIL,ZESTRIL) 20 MG tablet Take 1 tablet by mouth Daily. 90 tablet 2   • mometasone-formoterol (DULERA 200) 200-5 MCG/ACT inhaler Inhale 2 puffs 2 (Two) Times a Day.  11   • Omega-3 Fatty Acids (FISH OIL) 1000 MG capsule capsule Take  by mouth daily with breakfast.     • tamsulosin (FLOMAX) 0.4 MG capsule 24 hr capsule Take 1 capsule by mouth every night.     • triamcinolone (KENALOG) 0.1 % ointment Apply  topically 2 (Two) Times a Day. For up to 2 weeks 1 tube 0     No current facility-administered medications for this visit.          Review of Systems     Constitution: Denies any fatigue, fever or chills    HENT: Denies any headache, has hearing impairment and uses  "hearing aids    Eyes: Denies any blurring of vision, or photophobia     Cardivascular - As per history of present illness     Respiratory system-history of COPD and emphysema     Endocrine: Hyperlipidemia    Musculoskeletal:   history of arthritis with musculoskeletal problems    Gastrointestinal: No nausea, vomiting, or melena    Genitourinary: No dysuria or hematuria    Neurological:   No history of seizure disorder, stroke, memory problems    Psychiatric/Behavioral:        No history of depression,  No history of bipolar disorder or schizophrenia     Hematological- easy bruising due to dual antiplatelet therapy            OBJECTIVE    /70  Pulse 75  Ht 170.2 cm (67\")  Wt 87.1 kg (192 lb)  BMI 30.07 kg/m2      Physical Exam     Constitutional: is oriented to person, place, and time.     Skin-warm and dry, diffuse bruising of the hands    Well developed and nourished in no acute distress      Head: Normocephalic and atraumatic.     Eyes: Pupils are equal, round, and reactive to light.     Neck: Neck supple. No bruit in the carotids,    Cardiovascular: [place] in the fifth intercostal space   Regular rate, and  Rhythm,    S1 greater than S2,      Pulmonary/Chest:   Air  Entry is equal on both sides  Bilateral diffuse scattered rhonchi      Abdominal: Soft.  No hepatosplenomegaly, bowel sounds are present    Musculoskeletal: No kyphoscoliosis, no significant thickening of the joints    Neurological: is alert and oriented to person, place, and time.    cranial nerve are intact .   No motor or sensory deficit    Extremities-no edema, pulses are weak below the femoral artery on both sides      Psychiatric: He has a normal mood and affect.                  His behavior is normal.           Procedures      Lab Results   Component Value Date    WBC 6.19 07/06/2017    HGB 12.1 (L) 07/06/2017    HCT 36.5 (L) 07/06/2017    MCV 88.6 07/06/2017     07/06/2017     Lab Results   Component Value Date    GLUCOSE 93 " 07/06/2017    BUN 26 (H) 07/06/2017    CREATININE 1.09 07/06/2017    EGFRIFNONA 65 07/06/2017    BCR 23.9 07/06/2017    CO2 24.0 07/06/2017    CALCIUM 8.7 07/06/2017    ALBUMIN 3.80 07/06/2017    LABIL2 1.6 07/06/2017    AST 18 07/06/2017    ALT 32 07/06/2017     Lab Results   Component Value Date    CHOL 113 07/06/2017     Lab Results   Component Value Date    TRIG 57 07/06/2017    TRIG 63 05/04/2016     Lab Results   Component Value Date    HDL 58 (L) 07/06/2017    HDL 51 (L) 05/04/2016     Lab Results   Component Value Date    LDLCALC 50 05/04/2016     No results found for: LDL  No results found for: HDLLDLRATIO  No components found for: CHOLHDL  Lab Results   Component Value Date    HGBA1C 5.3 05/04/2016     Lab Results   Component Value Date    TSH 2.87 02/23/2016                  A/P    CAD status post CABG in the past, doing well no chest pain ,continue the Plavix as he is bruising easily.  He is on Cardizem CD for blood pressure as he cannot tolerate beta blockers due to wheezing.    Peripheral vascular disease-status post occlusion of the SFA has no critical limb ischemia, still walking but does have claudication I asked him to walk more.And stop when the pain gets worse and walk again when the pain goes away.  His most limitations due to COPD with shortness of breath    Hypertension well controlled with lisinopril and Cardizem CD and takes Vytorin for hyperlipidemia.    COPD with wheezing on Atrovent and Dulera .  He will have his labs checked in August by his family doctor and     Follow-up in 6 months            This document has been electronically signed by Branden Corral MD on December 11, 2017 8:36 AM       EMR Dragon/Transcription disclaimer:   Some of this note may be an electronic transcription/translation of spoken language to printed text. The electronic translation of spoken language may permit erroneous, or at times, nonsensical words or phrases to be inadvertently transcribed; Although  I have reviewed the note for such errors, some may still exist.

## 2018-01-29 ENCOUNTER — OFFICE VISIT (OUTPATIENT)
Dept: FAMILY MEDICINE CLINIC | Facility: CLINIC | Age: 78
End: 2018-01-29

## 2018-01-29 ENCOUNTER — APPOINTMENT (OUTPATIENT)
Dept: LAB | Facility: HOSPITAL | Age: 78
End: 2018-01-29

## 2018-01-29 VITALS
DIASTOLIC BLOOD PRESSURE: 86 MMHG | SYSTOLIC BLOOD PRESSURE: 142 MMHG | OXYGEN SATURATION: 95 % | HEART RATE: 73 BPM | WEIGHT: 186.3 LBS | BODY MASS INDEX: 29.24 KG/M2 | HEIGHT: 67 IN

## 2018-01-29 DIAGNOSIS — I10 ESSENTIAL HYPERTENSION: Primary | ICD-10-CM

## 2018-01-29 DIAGNOSIS — E87.5 HYPERKALEMIA: ICD-10-CM

## 2018-01-29 LAB
ANION GAP SERPL CALCULATED.3IONS-SCNC: 12 MMOL/L (ref 5–15)
BUN BLD-MCNC: 15 MG/DL (ref 7–21)
BUN/CREAT SERPL: 14.7 (ref 7–25)
CALCIUM SPEC-SCNC: 9 MG/DL (ref 8.4–10.2)
CHLORIDE SERPL-SCNC: 105 MMOL/L (ref 95–110)
CO2 SERPL-SCNC: 23 MMOL/L (ref 22–31)
CREAT BLD-MCNC: 1.02 MG/DL (ref 0.7–1.3)
GFR SERPL CREATININE-BSD FRML MDRD: 70 ML/MIN/1.73 (ref 60–98)
GLUCOSE BLD-MCNC: 95 MG/DL (ref 60–100)
POTASSIUM BLD-SCNC: 4.8 MMOL/L (ref 3.5–5.1)
SODIUM BLD-SCNC: 140 MMOL/L (ref 137–145)

## 2018-01-29 PROCEDURE — 36415 COLL VENOUS BLD VENIPUNCTURE: CPT | Performed by: FAMILY MEDICINE

## 2018-01-29 PROCEDURE — 99213 OFFICE O/P EST LOW 20 MIN: CPT | Performed by: FAMILY MEDICINE

## 2018-01-29 PROCEDURE — 80048 BASIC METABOLIC PNL TOTAL CA: CPT | Performed by: FAMILY MEDICINE

## 2018-01-29 RX ORDER — TAMSULOSIN HYDROCHLORIDE 0.4 MG/1
1 CAPSULE ORAL NIGHTLY
Qty: 90 CAPSULE | Refills: 3 | Status: SHIPPED | OUTPATIENT
Start: 2018-01-29

## 2018-01-29 NOTE — PROGRESS NOTES
Subjective   Chief Complaint   Patient presents with   • Hypertension     4 mo follow up       Xander Wallace is a 77 y.o. male who presents for Hypertension (4 mo follow up)   Hypertension   This is a chronic problem. The problem is controlled. Pertinent negatives include no chest pain, headaches, palpitations or shortness of breath. There are no associated agents to hypertension. Past treatments include ACE inhibitors. There are no compliance problems.      He is working on lowering potassium in his diet    The following portions of the patient's history were reviewed and updated as appropriate:problem list, current medications, allergies, past family history, past medical history, past social history and past surgical history    Past Medical History:   Diagnosis Date   • Aortic stenosis    • Asthma    • COPD (chronic obstructive pulmonary disease)    • Coronary artery disease involving coronary bypass graft     CABG 2012, stent 2016, stent 2005   • Emphysema of lung    • Hyperlipidemia    • Hypertension    • PVD (peripheral vascular disease)    • Seizure disorder    • Skin cancer of lip    • Sleep apnea        Social History   Substance Use Topics   • Smoking status: Former Smoker   • Smokeless tobacco: Never Used   • Alcohol use No     History   Sexual Activity   • Sexual activity: Defer       Medications:  Outpatient Medications Prior to Visit   Medication Sig Dispense Refill   • albuterol (PROVENTIL HFA;VENTOLIN HFA) 108 (90 BASE) MCG/ACT inhaler Inhale 2 puffs Every 4 (Four) Hours As Needed for Wheezing. 3 inhaler 3   • B Complex-Biotin-FA (SUPER B-COMPLEX) tablet Take  by mouth.     • clopidogrel (PLAVIX) 75 MG tablet Take 1 tablet by mouth Daily. 90 tablet 2   • diltiaZEM CD (CARDIZEM CD) 180 MG 24 hr capsule Take 1 capsule by mouth Daily. 90 capsule 3   • ezetimibe-simvastatin (VYTORIN) 10-40 MG per tablet Take 1 tablet by mouth Every Night. 90 tablet 4   • gabapentin (NEURONTIN) 600 MG tablet  "Take 600 mg by mouth 3 (three) times a day.     • ipratropium (ATROVENT HFA) 17 MCG/ACT inhaler Inhale 2 puffs 4 (four) times a day.     • ipratropium (ATROVENT) 0.02 % nebulizer solution Take 500 mcg by nebulization 4 (four) times a day.     • lamoTRIgine (LaMICtal) 200 MG tablet Take 200 mg by mouth daily.     • lisinopril (PRINIVIL,ZESTRIL) 20 MG tablet Take 1 tablet by mouth Daily. 90 tablet 2   • mometasone-formoterol (DULERA 200) 200-5 MCG/ACT inhaler Inhale 2 puffs 2 (Two) Times a Day.  11   • Omega-3 Fatty Acids (FISH OIL) 1000 MG capsule capsule Take  by mouth daily with breakfast.     • tamsulosin (FLOMAX) 0.4 MG capsule 24 hr capsule Take 1 capsule by mouth every night.     • triamcinolone (KENALOG) 0.1 % ointment Apply  topically 2 (Two) Times a Day. For up to 2 weeks 1 tube 0     No facility-administered medications prior to visit.        Allergies   Allergen Reactions   • Doxycycline    • Amoxicillin    • Ceftin [Cefuroxime]    • Methylphenidate Derivatives    • Nystatin    • Sulfur    • Theophyllines        Review of Systems   Constitutional: Negative for fatigue, fever and unexpected weight change.   HENT: Negative for rhinorrhea.    Eyes: Negative for visual disturbance.   Respiratory: Negative for cough and shortness of breath.    Cardiovascular: Negative for chest pain, palpitations and leg swelling.   Gastrointestinal: Negative for abdominal pain, blood in stool, constipation and diarrhea.   Endocrine: Negative for polydipsia, polyphagia and polyuria.   Genitourinary: Negative for difficulty urinating.   Musculoskeletal: Negative for arthralgias and back pain.   Skin: Negative for rash.   Neurological: Negative for headaches.   Psychiatric/Behavioral: Negative for sleep disturbance. The patient is not nervous/anxious.        Objective   Visit Vitals   • /86 (BP Location: Left arm, Patient Position: Sitting, Cuff Size: Large Adult)   • Pulse 73   • Ht 170.2 cm (67.01\")   • Wt 84.5 kg (186 lb " 4.8 oz)   • SpO2 95%   • BMI 29.17 kg/m2       Physical Exam   Constitutional: He is oriented to person, place, and time. He appears well-developed and well-nourished. No distress.   HENT:   Head: Normocephalic and atraumatic.   Nose: Nose normal.   Eyes: Conjunctivae and EOM are normal.   Neck: Normal range of motion.   Cardiovascular: Normal rate and regular rhythm.  Exam reveals no gallop and no friction rub.    Murmur heard.  Pulmonary/Chest: Effort normal and breath sounds normal. No respiratory distress. He has no wheezes. He has no rales.   Musculoskeletal: Normal range of motion. He exhibits no edema.   Neurological: He is alert and oriented to person, place, and time. No cranial nerve deficit.   Skin: Skin is warm and dry. He is not diaphoretic.   Psychiatric: He has a normal mood and affect. His behavior is normal.   Nursing note and vitals reviewed.      Assessment/Plan   Xander Wallace is a 77 y.o. male seen today for the following:     Diagnosis Plan   1. Essential hypertension  Basic Metabolic Panel   2. Hyperkalemia  Basic Metabolic Panel       Continue current meds  Recheck potassium    Patient is aware that I will be leaving the practice in the next few months and they will establish with another PCP.   Follow up with primary care in 3 months.      Follow up: Return in about 3 months (around 4/29/2018) for establish new pcp.          This document has been electronically signed by Leidy Stanford DO on January 29, 2018 4:03 PM

## 2018-02-21 ENCOUNTER — OFFICE VISIT (OUTPATIENT)
Dept: FAMILY MEDICINE CLINIC | Facility: CLINIC | Age: 78
End: 2018-02-21

## 2018-02-21 VITALS
BODY MASS INDEX: 29.34 KG/M2 | WEIGHT: 186.9 LBS | SYSTOLIC BLOOD PRESSURE: 132 MMHG | DIASTOLIC BLOOD PRESSURE: 78 MMHG | HEIGHT: 67 IN

## 2018-02-21 DIAGNOSIS — R31.29 MICROSCOPIC HEMATURIA: Primary | ICD-10-CM

## 2018-02-21 DIAGNOSIS — N40.1 BENIGN PROSTATIC HYPERPLASIA WITH LOWER URINARY TRACT SYMPTOMS, SYMPTOM DETAILS UNSPECIFIED: ICD-10-CM

## 2018-02-21 DIAGNOSIS — N18.2 CHRONIC RENAL INSUFFICIENCY, STAGE 2 (MILD): ICD-10-CM

## 2018-02-21 PROCEDURE — 99214 OFFICE O/P EST MOD 30 MIN: CPT | Performed by: FAMILY MEDICINE

## 2018-02-21 RX ORDER — TRIAMCINOLONE ACETONIDE 5 MG/G
CREAM TOPICAL
COMMUNITY
Start: 2018-02-15 | End: 2018-06-11

## 2018-02-21 RX ORDER — PREDNISONE 10 MG/1
TABLET ORAL
COMMUNITY
Start: 2018-02-15 | End: 2018-05-07

## 2018-02-21 NOTE — PROGRESS NOTES
Subjective   Xandercarrol Wallace is a 77 y.o. male.     History of Present Illness  seen Dr. Stanford's absence.  Patient apparently had lab work done for dermatology and they were concerned about similar results over they had no previous notes to go over.  Patient carries diagnoses of vascular disease COPD BPH chronic microscopic hematuria.  Patient been seen for what sounds like an eczematous rash.  Was placed on steroids and cream.  Lab work done revealed 3-10 red blood cells in the urine BUN and creatinine which are stable  cbcwhich showed a hemoglobin of 11 hematocrit of 35 which is actually improved over previous.  Fasciotomy improved with medication.    The following portions of the patient's history were reviewed and updated as appropriate: allergies, current medications, past family history, past medical history, past social history, past surgical history and problem list.    Review of Systems   Constitutional: Negative for activity change, appetite change, fatigue and unexpected weight change.   HENT: Negative for trouble swallowing and voice change.    Eyes: Negative for redness and visual disturbance.   Respiratory: Negative for cough and wheezing.    Cardiovascular: Negative for chest pain and palpitations.   Gastrointestinal: Negative for abdominal pain, constipation, diarrhea, nausea and vomiting.   Genitourinary: Negative for urgency.   Musculoskeletal: Negative for joint swelling.   Skin: Positive for rash.   Neurological: Negative for syncope and headaches.   Hematological: Negative for adenopathy.   Psychiatric/Behavioral: Negative for sleep disturbance.       Objective   Physical Exam   Constitutional: He appears well-developed.   HENT:   Head: Normocephalic.   Eyes: Pupils are equal, round, and reactive to light.   Neck: Normal range of motion.   Pulmonary/Chest: Effort normal.   Skin:   Right lateral knee right lateral leg shows hyperpigmented lesions what looks like Mild chronic  inflammation, possibly lichen simplex.  Left side is clear.   Psychiatric: He has a normal mood and affect. His speech is normal and behavior is normal. He exhibits abnormal recent memory.       Assessment/Plan   Xander was seen today for rash.    Diagnoses and all orders for this visit:    Microscopic hematuria    Benign prostatic hyperplasia with lower urinary tract symptoms, symptom details unspecified    Chronic renal insufficiency, stage 2 (mild)        skincare fitting of steroids.   on findings of lab work.  Counseled him.  Repeat urine at a later date of otherwise continue all medicines as outlined previously.  Keep regular April appointment.

## 2018-04-16 RX ORDER — DILTIAZEM HYDROCHLORIDE 180 MG/1
180 CAPSULE, COATED, EXTENDED RELEASE ORAL DAILY
Qty: 90 CAPSULE | Refills: 3 | Status: SHIPPED | OUTPATIENT
Start: 2018-04-16 | End: 2019-04-08 | Stop reason: SDUPTHER

## 2018-05-07 ENCOUNTER — OFFICE VISIT (OUTPATIENT)
Dept: FAMILY MEDICINE CLINIC | Facility: CLINIC | Age: 78
End: 2018-05-07

## 2018-05-07 ENCOUNTER — APPOINTMENT (OUTPATIENT)
Dept: LAB | Facility: HOSPITAL | Age: 78
End: 2018-05-07

## 2018-05-07 VITALS
DIASTOLIC BLOOD PRESSURE: 68 MMHG | WEIGHT: 184 LBS | SYSTOLIC BLOOD PRESSURE: 128 MMHG | BODY MASS INDEX: 28.88 KG/M2 | HEIGHT: 67 IN

## 2018-05-07 DIAGNOSIS — N18.2 CHRONIC RENAL INSUFFICIENCY, STAGE 2 (MILD): Chronic | ICD-10-CM

## 2018-05-07 DIAGNOSIS — N40.1 BENIGN PROSTATIC HYPERPLASIA WITH LOWER URINARY TRACT SYMPTOMS, SYMPTOM DETAILS UNSPECIFIED: Chronic | ICD-10-CM

## 2018-05-07 DIAGNOSIS — I10 ESSENTIAL HYPERTENSION: Primary | Chronic | ICD-10-CM

## 2018-05-07 DIAGNOSIS — R31.29 MICROSCOPIC HEMATURIA: ICD-10-CM

## 2018-05-07 LAB
BILIRUB UR QL STRIP: NEGATIVE
CLARITY UR: ABNORMAL
COLOR UR: ABNORMAL
GLUCOSE UR STRIP-MCNC: NEGATIVE MG/DL
HGB UR QL STRIP.AUTO: NEGATIVE
KETONES UR QL STRIP: NEGATIVE
LEUKOCYTE ESTERASE UR QL STRIP.AUTO: ABNORMAL
NITRITE UR QL STRIP: NEGATIVE
PH UR STRIP.AUTO: <=5 [PH] (ref 5–9)
PROT UR QL STRIP: ABNORMAL
SP GR UR STRIP: 1.02 (ref 1–1.03)
UROBILINOGEN UR QL STRIP: ABNORMAL

## 2018-05-07 PROCEDURE — 81003 URINALYSIS AUTO W/O SCOPE: CPT | Performed by: FAMILY MEDICINE

## 2018-05-07 PROCEDURE — 99214 OFFICE O/P EST MOD 30 MIN: CPT | Performed by: FAMILY MEDICINE

## 2018-05-07 NOTE — PROGRESS NOTES
Subjective   Xander Wallace is a 77 y.o. male.     History of Present Illness   follow-up.  Diagnosis hypertension vascular disease coronary disease BPH COPD etc.  Interim had lab work done elsewhere which showed microscopic hematuria.  Symptoms are not present.  Does need repeat documentation of this is a chronic problem.  His been worked up in the past apparently.  Had a colonoscopy about 3 or 4 years ago.  Is up-to-date on all other.  Medicines are noted.    The following portions of the patient's history were reviewed and updated as appropriate: allergies, current medications, past family history, past medical history, past social history, past surgical history and problem list.    Review of Systems   Constitutional: Negative for activity change, appetite change, fatigue and unexpected weight change.   HENT: Negative for trouble swallowing and voice change.    Eyes: Negative for redness and visual disturbance.   Respiratory: Positive for cough (Chronic using medicine for same). Negative for wheezing.    Cardiovascular: Negative for chest pain and palpitations.   Gastrointestinal: Negative for abdominal pain, constipation, diarrhea, nausea and vomiting.   Genitourinary: Negative for urgency.   Musculoskeletal: Negative for joint swelling.   Neurological: Negative for syncope and headaches.   Hematological: Negative for adenopathy.   Psychiatric/Behavioral: Negative for sleep disturbance.       Objective   Physical Exam   Constitutional: He is oriented to person, place, and time. He appears well-developed.   HENT:   Head: Normocephalic.   Nose: Nose normal.   Eyes: Pupils are equal, round, and reactive to light.   Neck: Normal range of motion. No thyromegaly present.   Cardiovascular: Normal rate, regular rhythm, normal heart sounds and intact distal pulses.  Exam reveals no gallop and no friction rub.    No murmur heard.  Pulmonary/Chest: He has wheezes in the right lower field and the left lower field.  He has rhonchi in the right lower field and the left lower field.   scant expiratory chronic   Abdominal: Soft. He exhibits no distension and no mass. There is no tenderness.   Musculoskeletal: Normal range of motion.   Neurological: He is alert and oriented to person, place, and time. He has normal reflexes.   Skin: Skin is warm and dry.   Psychiatric: He has a normal mood and affect.       Assessment/Plan   Xander was seen today for establish care.    Diagnoses and all orders for this visit:    Essential hypertension    Chronic renal insufficiency, stage 2 (mild)    Benign prostatic hyperplasia with lower urinary tract symptoms, symptom details unspecified    Microscopic hematuria  -     Urinalysis - Urine, Clean Catch         on fall prevention hydration summer heat prevention etc.  Continue all medicines Rachael rescue inhalers.  Recheck 6 months

## 2018-05-14 RX ORDER — CLOPIDOGREL BISULFATE 75 MG/1
75 TABLET ORAL DAILY
Qty: 90 TABLET | Refills: 2 | Status: SHIPPED | OUTPATIENT
Start: 2018-05-14 | End: 2018-08-13 | Stop reason: SDUPTHER

## 2018-06-11 ENCOUNTER — OFFICE VISIT (OUTPATIENT)
Dept: CARDIOLOGY | Facility: CLINIC | Age: 78
End: 2018-06-11

## 2018-06-11 VITALS
BODY MASS INDEX: 28.43 KG/M2 | OXYGEN SATURATION: 98 % | HEART RATE: 69 BPM | DIASTOLIC BLOOD PRESSURE: 62 MMHG | SYSTOLIC BLOOD PRESSURE: 138 MMHG | HEIGHT: 67 IN | WEIGHT: 181.1 LBS

## 2018-06-11 DIAGNOSIS — I73.9 PERIPHERAL ARTERIAL DISEASE (HCC): Chronic | ICD-10-CM

## 2018-06-11 DIAGNOSIS — E78.2 MIXED HYPERLIPIDEMIA: Chronic | ICD-10-CM

## 2018-06-11 DIAGNOSIS — J41.8 MIXED SIMPLE AND MUCOPURULENT CHRONIC BRONCHITIS (HCC): Chronic | ICD-10-CM

## 2018-06-11 DIAGNOSIS — I25.810 CORONARY ARTERY DISEASE INVOLVING CORONARY BYPASS GRAFT OF NATIVE HEART WITHOUT ANGINA PECTORIS: Primary | Chronic | ICD-10-CM

## 2018-06-11 PROCEDURE — 99214 OFFICE O/P EST MOD 30 MIN: CPT | Performed by: INTERNAL MEDICINE

## 2018-06-11 RX ORDER — BUDESONIDE AND FORMOTEROL FUMARATE DIHYDRATE 160; 4.5 UG/1; UG/1
2 AEROSOL RESPIRATORY (INHALATION)
Status: ON HOLD | COMMUNITY
End: 2021-03-24

## 2018-06-11 NOTE — PROGRESS NOTES
[facility] Cardiology  OFFICE NOTE    Xander Wallace  78 y.o. male    06/11/2018  1. Coronary artery disease involving coronary bypass graft of native heart without angina pectoris    2. Mixed hyperlipidemia    3. Peripheral arterial disease    4. Mixed simple and mucopurulent chronic bronchitis        Chief complaint -Follow-up CAD      History of present Illness- 78 yr old gentleman with history of coronary disease prior CABG in the past and it has history of peripheral vascular disease with total occlusion of the SFA.  He has claudication pain With walking  2-3 blocks, but no rest pain but does not want to do any PTA at this time and he will continue to walk as much as he can.  He denies any chest pain.  He has shortness of breath secondary to COPD and Got little worse when he was mowing the yard as there was a lot of dust, I asked him to wear a mask during when he is mowing the yard,and is seeing a pulmonologist in [place].  He is on multiple inhalers.  He denies any TIA symptoms of GI symptoms.He stays active as much as he can and he had fallen couple of times in the last 6 months and he gets around with a walking stick.  He denies any CNS symptoms.              Allergies   Allergen Reactions   • Doxycycline    • Amoxicillin    • Ceftin [Cefuroxime]    • Methylphenidate Derivatives    • Nystatin    • Sulfur    • Theophyllines          Past Medical History:   Diagnosis Date   • Aortic stenosis    • Asthma    • COPD (chronic obstructive pulmonary disease)    • Coronary artery disease involving coronary bypass graft     CABG 2012, stent 2016, stent 2005   • Emphysema of lung    • Hyperlipidemia    • Hypertension    • PVD (peripheral vascular disease)    • Seizure disorder    • Skin cancer of lip    • Sleep apnea          Past Surgical History:   Procedure Laterality Date   • APPENDECTOMY     • CARDIAC SURGERY      CABG 2012   • CAROTID STENT     • COLON SURGERY     • CORONARY  ARTERY BYPASS GRAFT     • HEMORRHOIDECTOMY     • HERNIA REPAIR           Family History   Problem Relation Age of Onset   • Cancer Father          Social History     Social History   • Marital status:      Spouse name: N/A   • Number of children: N/A   • Years of education: N/A     Occupational History   • Not on file.     Social History Main Topics   • Smoking status: Former Smoker   • Smokeless tobacco: Never Used   • Alcohol use No   • Drug use: No   • Sexual activity: Defer     Other Topics Concern   • Not on file     Social History Narrative   • No narrative on file         Current Outpatient Prescriptions   Medication Sig Dispense Refill   • albuterol (PROVENTIL HFA;VENTOLIN HFA) 108 (90 BASE) MCG/ACT inhaler Inhale 2 puffs Every 4 (Four) Hours As Needed for Wheezing. 3 inhaler 3   • B Complex-Biotin-FA (SUPER B-COMPLEX) tablet Take  by mouth.     • budesonide-formoterol (SYMBICORT) 160-4.5 MCG/ACT inhaler Inhale 2 puffs 2 (Two) Times a Day.     • clopidogrel (PLAVIX) 75 MG tablet Take 1 tablet by mouth Daily. 90 tablet 2   • diltiaZEM CD (CARDIZEM CD) 180 MG 24 hr capsule Take 1 capsule by mouth Daily. 90 capsule 3   • ezetimibe-simvastatin (VYTORIN) 10-40 MG per tablet Take 1 tablet by mouth Every Night. 90 tablet 4   • gabapentin (NEURONTIN) 600 MG tablet Take 600 mg by mouth 3 (three) times a day.     • lamoTRIgine (LaMICtal) 200 MG tablet Take 200 mg by mouth daily.     • lisinopril (PRINIVIL,ZESTRIL) 20 MG tablet Take 1 tablet by mouth Daily. 90 tablet 2   • Omega-3 Fatty Acids (FISH OIL) 1000 MG capsule capsule Take  by mouth daily with breakfast.     • tamsulosin (FLOMAX) 0.4 MG capsule 24 hr capsule Take 1 capsule by mouth Every Night. 90 capsule 3     No current facility-administered medications for this visit.          Review of Systems     Constitution: Denies any fatigue, fever or chills    HENT: Denies any headache, has hearing impairment and uses hearing aids    Eyes: Denies any blurring  "of vision, or photophobia     Cardivascular - History of CAD status post CABG    Respiratory system-history of COPD and emphysema     Endocrine: Hyperlipidemia    Musculoskeletal:   history of arthritis with musculoskeletal problems    Gastrointestinal: No nausea, vomiting, or melena    Genitourinary: No dysuria or hematuria    Neurological:   No history of seizure disorder, stroke, memory problems    Psychiatric/Behavioral:        No history of depression,      Hematological- easy bruising due to dual antiplatelet therapy            OBJECTIVE    /62 (BP Location: Left arm, Patient Position: Sitting)   Pulse 69   Ht 170.2 cm (67.01\")   Wt 82.1 kg (181 lb 1.6 oz)   SpO2 98%   BMI 28.36 kg/m²       Physical Exam     Constitutional: is oriented to person, place, and time.     Skin-warm and dry, diffuse bruising of the hands    Well developed and nourished in no acute distress      Head: Normocephalic and atraumatic.     Eyes: Pupils are equal,     Neck: Neck supple. No bruit in the carotids,    Cardiovascular: [place] in the fifth intercostal space   Regular rate, and  Rhythm,    S1 greater than S2,      Pulmonary/Chest:   Air  Entry is equal on both sides  Bilateral diffuse scattered rhonchi      Abdominal: Soft.  No hepatosplenomegaly    Musculoskeletal: No kyphoscoliosis, no significant thickening of the joints    Neurological: is alert and oriented to person, place, and time.    cranial nerve are intact .   No motor or sensory deficit    Extremities-no edema, pulses are weak below the femoral artery on both sides      Psychiatric: He has a normal mood and affect.                  His behavior is normal.           Procedures      Lab Results   Component Value Date    WBC 6.19 07/06/2017    HGB 12.1 (L) 07/06/2017    HCT 36.5 (L) 07/06/2017    MCV 88.6 07/06/2017     07/06/2017     Lab Results   Component Value Date    GLUCOSE 95 01/29/2018    BUN 15 01/29/2018    CREATININE 1.02 01/29/2018    " EGFRIFNONA 70 01/29/2018    BCR 14.7 01/29/2018    CO2 23.0 01/29/2018    CALCIUM 9.0 01/29/2018    ALBUMIN 3.80 07/06/2017    LABIL2 1.6 07/06/2017    AST 18 07/06/2017    ALT 32 07/06/2017     Lab Results   Component Value Date    CHOL 113 07/06/2017     Lab Results   Component Value Date    TRIG 57 07/06/2017    TRIG 63 05/04/2016     Lab Results   Component Value Date    HDL 58 (L) 07/06/2017    HDL 51 (L) 05/04/2016     No components found for: LDLCALC  Lab Results   Component Value Date    LDL 48 07/06/2017    LDL 50 05/04/2016     No results found for: HDLLDLRATIO  No components found for: CHOLHDL  Lab Results   Component Value Date    HGBA1C 5.3 05/04/2016     Lab Results   Component Value Date    TSH 2.87 02/23/2016                  A/P    CAD status post CABG in the past, doing well no chest pain ,continue the Plavix as he is bruising easily.  He is on Cardizem CD for blood pressure as he cannot tolerate beta blockers due to wheezing.    Peripheral vascular disease-status post occlusion of the SFA has no critical limb ischemia, still walking but does have claudication With walking I asked him to walk more.And stop when the pain gets worse and walk again when the pain goes away.  His most limitations due to COPD with shortness of breath    Hypertension well controlled with lisinopril and Cardizem CD and takes Vytorin for hyperlipidemia.    COPD with wheezing on Atrovent and Dulera .      Follow-up in 6 months            This document has been electronically signed by Branden Corral MD on June 11, 2018 8:59 AM       EMR Dragon/Transcription disclaimer:   Some of this note may be an electronic transcription/translation of spoken language to printed text. The electronic translation of spoken language may permit erroneous, or at times, nonsensical words or phrases to be inadvertently transcribed; Although I have reviewed the note for such errors, some may still exist.

## 2018-07-31 RX ORDER — LISINOPRIL 20 MG/1
20 TABLET ORAL DAILY
Qty: 90 TABLET | Refills: 3 | Status: SHIPPED | OUTPATIENT
Start: 2018-07-31 | End: 2019-08-05 | Stop reason: SDUPTHER

## 2018-08-13 RX ORDER — CLOPIDOGREL BISULFATE 75 MG/1
75 TABLET ORAL DAILY
Qty: 90 TABLET | Refills: 2 | Status: SHIPPED | OUTPATIENT
Start: 2018-08-13 | End: 2019-04-23 | Stop reason: SDUPTHER

## 2018-11-05 ENCOUNTER — OFFICE VISIT (OUTPATIENT)
Dept: FAMILY MEDICINE CLINIC | Facility: CLINIC | Age: 78
End: 2018-11-05

## 2018-11-05 ENCOUNTER — APPOINTMENT (OUTPATIENT)
Dept: LAB | Facility: HOSPITAL | Age: 78
End: 2018-11-05

## 2018-11-05 VITALS
DIASTOLIC BLOOD PRESSURE: 60 MMHG | WEIGHT: 179.2 LBS | SYSTOLIC BLOOD PRESSURE: 120 MMHG | HEIGHT: 67 IN | BODY MASS INDEX: 28.12 KG/M2

## 2018-11-05 DIAGNOSIS — N18.2 CHRONIC RENAL INSUFFICIENCY, STAGE 2 (MILD): Chronic | ICD-10-CM

## 2018-11-05 DIAGNOSIS — I25.708 CORONARY ARTERY DISEASE INVOLVING CORONARY BYPASS GRAFT OF NATIVE HEART WITH OTHER FORMS OF ANGINA PECTORIS (HCC): ICD-10-CM

## 2018-11-05 DIAGNOSIS — I10 ESSENTIAL HYPERTENSION: Primary | Chronic | ICD-10-CM

## 2018-11-05 DIAGNOSIS — J41.8 MIXED SIMPLE AND MUCOPURULENT CHRONIC BRONCHITIS (HCC): ICD-10-CM

## 2018-11-05 DIAGNOSIS — G40.909 SEIZURE DISORDER (HCC): ICD-10-CM

## 2018-11-05 PROBLEM — I25.10 CORONARY ARTERY DISEASE: Status: ACTIVE | Noted: 2018-11-05

## 2018-11-05 PROBLEM — I25.10 CORONARY ARTERY DISEASE: Chronic | Status: ACTIVE | Noted: 2018-11-05

## 2018-11-05 PROBLEM — R01.1 CARDIAC MURMUR: Status: ACTIVE | Noted: 2018-11-05

## 2018-11-05 LAB
ALBUMIN SERPL-MCNC: 3.7 G/DL (ref 3.4–4.8)
ALBUMIN/GLOB SERPL: 1.2 G/DL (ref 1.1–1.8)
ALP SERPL-CCNC: 97 U/L (ref 38–126)
ALT SERPL W P-5'-P-CCNC: 27 U/L (ref 21–72)
ANION GAP SERPL CALCULATED.3IONS-SCNC: 10 MMOL/L (ref 5–15)
AST SERPL-CCNC: 50 U/L (ref 17–59)
BILIRUB SERPL-MCNC: 0.2 MG/DL (ref 0.2–1.3)
BUN BLD-MCNC: 26 MG/DL (ref 7–21)
BUN/CREAT SERPL: 19.5 (ref 7–25)
CALCIUM SPEC-SCNC: 8.9 MG/DL (ref 8.4–10.2)
CHLORIDE SERPL-SCNC: 104 MMOL/L (ref 95–110)
CO2 SERPL-SCNC: 26 MMOL/L (ref 22–31)
CREAT BLD-MCNC: 1.33 MG/DL (ref 0.7–1.3)
GFR SERPL CREATININE-BSD FRML MDRD: 52 ML/MIN/1.73 (ref 42–98)
GLOBULIN UR ELPH-MCNC: 3.1 GM/DL (ref 2.3–3.5)
GLUCOSE BLD-MCNC: 103 MG/DL (ref 60–100)
MAGNESIUM SERPL-MCNC: 2.2 MG/DL (ref 1.6–2.3)
POTASSIUM BLD-SCNC: 5.5 MMOL/L (ref 3.5–5.1)
PROT SERPL-MCNC: 6.8 G/DL (ref 6.3–8.6)
SODIUM BLD-SCNC: 140 MMOL/L (ref 137–145)

## 2018-11-05 PROCEDURE — 99214 OFFICE O/P EST MOD 30 MIN: CPT | Performed by: FAMILY MEDICINE

## 2018-11-05 PROCEDURE — 36415 COLL VENOUS BLD VENIPUNCTURE: CPT | Performed by: FAMILY MEDICINE

## 2018-11-05 PROCEDURE — 80053 COMPREHEN METABOLIC PANEL: CPT | Performed by: FAMILY MEDICINE

## 2018-11-05 PROCEDURE — 83735 ASSAY OF MAGNESIUM: CPT | Performed by: FAMILY MEDICINE

## 2018-11-05 RX ORDER — TRIAMCINOLONE ACETONIDE 5 MG/G
CREAM TOPICAL
COMMUNITY
Start: 2018-10-29 | End: 2020-06-22

## 2018-11-05 RX ORDER — ALBUTEROL SULFATE 90 UG/1
2 AEROSOL, METERED RESPIRATORY (INHALATION) EVERY 4 HOURS PRN
Qty: 3 INHALER | Refills: 3 | Status: SHIPPED | OUTPATIENT
Start: 2018-11-05 | End: 2020-01-24 | Stop reason: SDUPTHER

## 2018-11-05 NOTE — PROGRESS NOTES
Subjective   Xandercarrol Wallace is a 78 y.o. male.     History of Present Illness   follow-up hypertension vascular disease seizure disorder mild COPD.  Interim says feels well has occasional fall.  Counseled following technique and falling prevention.  Seeing all specialists including cardiology and neurology.  Overall says he feels stable.  Time for lab work.  Has had a flu vaccine.    The following portions of the patient's history were reviewed and updated as appropriate: allergies, current medications, past family history, past medical history, past social history, past surgical history and problem list.    Review of Systems   Constitutional: Negative for activity change, appetite change, fatigue and unexpected weight change.   HENT: Negative for trouble swallowing and voice change.    Eyes: Negative for redness and visual disturbance.   Respiratory: Negative for cough and wheezing.    Cardiovascular: Negative for chest pain and palpitations.   Gastrointestinal: Negative for abdominal pain, constipation, diarrhea, nausea and vomiting.   Genitourinary: Negative for urgency.   Musculoskeletal: Negative for joint swelling.   Neurological: Negative for syncope and headaches.   Hematological: Negative for adenopathy.   Psychiatric/Behavioral: Negative for sleep disturbance.       Objective   Physical Exam   Constitutional: He is oriented to person, place, and time. He appears well-developed.   HENT:   Head: Normocephalic.   Nose: Nose normal.   Eyes: Pupils are equal, round, and reactive to light.   Neck: Normal range of motion. No thyromegaly present.   Cardiovascular: Normal rate, regular rhythm and intact distal pulses.  Exam reveals no gallop and no friction rub.    Murmur heard.   Systolic murmur is present with a grade of 2/6   306 prescription no systolic murmur.  We'll be deferred to cardiology for evaluation but noted   Pulmonary/Chest: Breath sounds normal.   Abdominal: Soft. He exhibits no distension  and no mass. There is no tenderness.   Musculoskeletal: Normal range of motion.   Neurological: He is alert and oriented to person, place, and time. He has normal strength and normal reflexes. No cranial nerve deficit or sensory deficit. He displays a negative Romberg sign.   Skin: Skin is warm and dry.   Psychiatric: He has a normal mood and affect. His behavior is normal. His speech is delayed. He exhibits abnormal recent memory.       Assessment/Plan   Xander was seen today for hypertension.    Diagnoses and all orders for this visit:    Essential hypertension  -     Comprehensive Metabolic Panel  -     Magnesium    Seizure disorder (CMS/HCC)    Coronary artery disease involving coronary bypass graft of native heart with other forms of angina pectoris (CMS/HCC)    Chronic renal insufficiency, stage 2 (mild)    Mixed simple and mucopurulent chronic bronchitis (CMS/HCC)  -     albuterol (PROVENTIL HFA;VENTOLIN HFA) 108 (90 Base) MCG/ACT inhaler; Inhale 2 puffs Every 4 (Four) Hours As Needed for Wheezing.      Defer to cardiology for murmur counseled following up with all subspecialists  on one a time hydration mainly fall prevention.  Medicines outlined.  Recheck 6 months.

## 2018-11-26 RX ORDER — NITROGLYCERIN 0.4 MG/1
TABLET SUBLINGUAL
Qty: 25 TABLET | Refills: 12 | Status: SHIPPED | OUTPATIENT
Start: 2018-11-26 | End: 2021-02-22

## 2018-12-18 ENCOUNTER — OFFICE VISIT (OUTPATIENT)
Dept: CARDIOLOGY | Facility: CLINIC | Age: 78
End: 2018-12-18

## 2018-12-18 VITALS
SYSTOLIC BLOOD PRESSURE: 120 MMHG | HEART RATE: 74 BPM | DIASTOLIC BLOOD PRESSURE: 48 MMHG | HEIGHT: 67 IN | WEIGHT: 179 LBS | OXYGEN SATURATION: 98 % | BODY MASS INDEX: 28.09 KG/M2

## 2018-12-18 DIAGNOSIS — I73.9 PERIPHERAL ARTERIAL DISEASE (HCC): Chronic | ICD-10-CM

## 2018-12-18 DIAGNOSIS — E78.2 MIXED HYPERLIPIDEMIA: Chronic | ICD-10-CM

## 2018-12-18 DIAGNOSIS — I25.810 CORONARY ARTERY DISEASE INVOLVING CORONARY BYPASS GRAFT OF NATIVE HEART WITHOUT ANGINA PECTORIS: Primary | Chronic | ICD-10-CM

## 2018-12-18 DIAGNOSIS — I10 ESSENTIAL HYPERTENSION: Chronic | ICD-10-CM

## 2018-12-18 DIAGNOSIS — N18.2 CHRONIC RENAL INSUFFICIENCY, STAGE 2 (MILD): Chronic | ICD-10-CM

## 2018-12-18 PROCEDURE — 99214 OFFICE O/P EST MOD 30 MIN: CPT | Performed by: INTERNAL MEDICINE

## 2018-12-18 RX ORDER — ROSUVASTATIN CALCIUM 20 MG/1
20 TABLET, COATED ORAL DAILY
Qty: 90 TABLET | Refills: 3 | Status: SHIPPED | OUTPATIENT
Start: 2018-12-18 | End: 2019-09-04 | Stop reason: SDUPTHER

## 2018-12-18 NOTE — PROGRESS NOTES
[facility] Cardiology  OFFICE NOTE    Xander Wallace  78 y.o. male    12/18/2018  1. Coronary artery disease involving coronary bypass graft of native heart without angina pectoris    2. Chronic renal insufficiency, stage 2 (mild)    3. Mixed hyperlipidemia    4. Peripheral arterial disease (CMS/HCC)    5. Essential hypertension        Chief complaint -Follow-up CAD      History of present Illness- 78 yr old gentleman with history of coronary disease prior CABG in the past and it has history of peripheral vascular disease with total occlusion of the SFA.  He has claudication pain With walking  2-3 blocks, but no rest pain but does not want to do any PTA at this time and he will continue to walk as much as he can.  He denies any chest pain.  He has shortness of breath secondary to COPD and is seeing a pulmonologist in [place].  He is on multiple inhalers.  He denies any TIA symptoms of GI symptoms.He stays active as much as he can  and he gets around with a walking stick.  He denies any CNS symptoms.              Allergies   Allergen Reactions   • Doxycycline    • Amoxicillin    • Ceftin [Cefuroxime]    • Methylphenidate Derivatives    • Nystatin    • Sulfur    • Theophyllines          Past Medical History:   Diagnosis Date   • Aortic stenosis    • Asthma    • COPD (chronic obstructive pulmonary disease) (CMS/HCC)    • Coronary artery disease involving coronary bypass graft     CABG 2012, stent 2016, stent 2005   • Emphysema of lung (CMS/HCC)    • Hyperlipidemia    • Hypertension    • PVD (peripheral vascular disease) (CMS/HCC)    • Seizure disorder (CMS/HCC)    • Skin cancer of lip    • Sleep apnea          Past Surgical History:   Procedure Laterality Date   • APPENDECTOMY     • CARDIAC SURGERY      CABG 2012   • CAROTID STENT     • COLON SURGERY     • CORONARY ARTERY BYPASS GRAFT     • HEMORRHOIDECTOMY     • HERNIA REPAIR           Family History   Problem Relation Age of Onset   •  Cancer Father          Social History     Socioeconomic History   • Marital status:      Spouse name: Not on file   • Number of children: Not on file   • Years of education: Not on file   • Highest education level: Not on file   Social Needs   • Financial resource strain: Not on file   • Food insecurity - worry: Not on file   • Food insecurity - inability: Not on file   • Transportation needs - medical: Not on file   • Transportation needs - non-medical: Not on file   Occupational History   • Not on file   Tobacco Use   • Smoking status: Former Smoker   • Smokeless tobacco: Never Used   Substance and Sexual Activity   • Alcohol use: No   • Drug use: No   • Sexual activity: Defer   Other Topics Concern   • Not on file   Social History Narrative   • Not on file         Current Outpatient Medications   Medication Sig Dispense Refill   • albuterol (PROVENTIL HFA;VENTOLIN HFA) 108 (90 Base) MCG/ACT inhaler Inhale 2 puffs Every 4 (Four) Hours As Needed for Wheezing. 3 inhaler 3   • B Complex-Biotin-FA (SUPER B-COMPLEX) tablet Take  by mouth.     • budesonide-formoterol (SYMBICORT) 160-4.5 MCG/ACT inhaler Inhale 2 puffs 2 (Two) Times a Day.     • clopidogrel (PLAVIX) 75 MG tablet Take 1 tablet by mouth Daily. 90 tablet 2   • diltiaZEM CD (CARDIZEM CD) 180 MG 24 hr capsule Take 1 capsule by mouth Daily. 90 capsule 3   • gabapentin (NEURONTIN) 600 MG tablet Take 600 mg by mouth 3 (three) times a day.     • lamoTRIgine (LaMICtal) 200 MG tablet Take 200 mg by mouth daily.     • lisinopril (PRINIVIL,ZESTRIL) 20 MG tablet Take 1 tablet by mouth Daily. 90 tablet 3   • nitroglycerin (NITROSTAT) 0.4 MG SL tablet 1 under the tongue as needed for angina, may repeat q5mins for up three doses 25 tablet 12   • Omega-3 Fatty Acids (FISH OIL) 1000 MG capsule capsule Take  by mouth daily with breakfast.     • tamsulosin (FLOMAX) 0.4 MG capsule 24 hr capsule Take 1 capsule by mouth Every Night. 90 capsule 3   • triamcinolone  "(KENALOG) 0.5 % cream      • rosuvastatin (CRESTOR) 20 MG tablet Take 1 tablet by mouth Daily. 90 tablet 3     No current facility-administered medications for this visit.          Review of Systems     Constitution: Denies any fatigue, fever or chills    HENT: Denies any headache, has hearing impairment and uses hearing aids    Eyes: Denies any blurring of vision, or photophobia     Cardivascular - History of CAD status post CABG    Respiratory system-history of COPD and emphysema     Endocrine: Hyperlipidemia    Musculoskeletal:   history of arthritis with musculoskeletal problems    Gastrointestinal: No nausea, vomiting, or melena    Genitourinary: No dysuria or hematuria    Neurological:   No history of seizure disorder, stroke, memory problems    Psychiatric/Behavioral:        No history of depression,      Hematological- easy bruising due to dual antiplatelet therapy            OBJECTIVE    /48   Pulse 74   Ht 170.2 cm (67.01\")   Wt 81.2 kg (179 lb)   SpO2 98%   BMI 28.03 kg/m²       Physical Exam     Constitutional: is oriented to person, place, and time.     Skin-warm and dry, diffuse bruising of the hands    Well developed and nourished in no acute distress      Head: Normocephalic and atraumatic.     Eyes: Pupils are equal,     Neck: Neck supple. No bruit in the carotids,    Cardiovascular: [place] in the fifth intercostal space   Regular rate, and  Rhythm,    S1 greater than S2,      Pulmonary/Chest:   Air  Entry is equal on both sides  Bilateral diffuse scattered rhonchi      Abdominal: Soft.  No hepatosplenomegaly    Musculoskeletal: No kyphoscoliosis, no significant thickening of the joints    Neurological: is alert and oriented to person, place, and time.    cranial nerve are intact .   No motor or sensory deficit    Extremities-no edema, pulses are weak below the femoral artery on both sides      Psychiatric: He has a normal mood and affect.                  His behavior is normal. "           Procedures      Lab Results   Component Value Date    WBC 6.19 07/06/2017    HGB 12.1 (L) 07/06/2017    HCT 36.5 (L) 07/06/2017    MCV 88.6 07/06/2017     07/06/2017     Lab Results   Component Value Date    GLUCOSE 103 (H) 11/05/2018    BUN 26 (H) 11/05/2018    CREATININE 1.33 (H) 11/05/2018    EGFRIFNONA 52 11/05/2018    BCR 19.5 11/05/2018    CO2 26.0 11/05/2018    CALCIUM 8.9 11/05/2018    ALBUMIN 3.70 11/05/2018    AST 50 11/05/2018    ALT 27 11/05/2018     Lab Results   Component Value Date    CHOL 113 07/06/2017     Lab Results   Component Value Date    TRIG 57 07/06/2017    TRIG 63 05/04/2016     Lab Results   Component Value Date    HDL 58 (L) 07/06/2017    HDL 51 (L) 05/04/2016     No components found for: LDLCALC  Lab Results   Component Value Date    LDL 48 07/06/2017    LDL 50 05/04/2016     No results found for: HDLLDLRATIO  No components found for: CHOLHDL  Lab Results   Component Value Date    HGBA1C 5.3 05/04/2016     Lab Results   Component Value Date    TSH 2.87 02/23/2016                  A/P    CAD status post CABG in the past, doing well no chest pain ,continue the Plavix as he is bruising easily.  He is on Cardizem CD for blood pressure as he cannot tolerate beta blockers due to wheezing.    Peripheral vascular disease-status post occlusion of the SFA has no critical limb ischemia, still walking but does have claudication With walking I asked him to walk more.And stop when the pain gets worse and walk again when the pain goes away.  His most limitations due to COPD with shortness of breath    Hypertension well controlled with lisinopril and Cardizem CD and takes Crestor for hyperlipidemia.    COPD with wheezing on Atrovent and Dulera .      Follow-up in 8 months            This document has been electronically signed by Branden Corral MD on December 18, 2018 1:54 PM       EMR Dragon/Transcription disclaimer:   Some of this note may be an electronic  transcription/translation of spoken language to printed text. The electronic translation of spoken language may permit erroneous, or at times, nonsensical words or phrases to be inadvertently transcribed; Although I have reviewed the note for such errors, some may still exist.

## 2019-03-28 ENCOUNTER — OFFICE VISIT (OUTPATIENT)
Dept: FAMILY MEDICINE CLINIC | Facility: CLINIC | Age: 79
End: 2019-03-28

## 2019-03-28 VITALS
WEIGHT: 178.4 LBS | DIASTOLIC BLOOD PRESSURE: 60 MMHG | HEIGHT: 67 IN | SYSTOLIC BLOOD PRESSURE: 126 MMHG | BODY MASS INDEX: 28 KG/M2

## 2019-03-28 DIAGNOSIS — S81.802A WOUND OF LEFT LOWER EXTREMITY, INITIAL ENCOUNTER: Primary | ICD-10-CM

## 2019-03-28 PROCEDURE — 99213 OFFICE O/P EST LOW 20 MIN: CPT | Performed by: FAMILY MEDICINE

## 2019-03-28 RX ORDER — MUPIROCIN CALCIUM 20 MG/G
CREAM TOPICAL 3 TIMES DAILY
Qty: 30 G | Refills: 1 | Status: SHIPPED | OUTPATIENT
Start: 2019-03-28 | End: 2020-01-20 | Stop reason: SDUPTHER

## 2019-03-28 NOTE — PROGRESS NOTES
Subjective   Xanderlong Wallace is a 78 y.o. male.     History of Present Illness outside emergency room follow-up laceration leg.  Occurred after working around equipment.  We have counseled again as previously on trauma prevention.  Had 16 stitches placed with 5 subcutaneous in the left lateral leg approximately 13 days ago.    The following portions of the patient's history were reviewed and updated as appropriate: allergies, current medications, past family history, past medical history, past social history, past surgical history and problem list.    Review of Systems   Constitutional: Negative for activity change, appetite change, fatigue and unexpected weight change.   HENT: Negative for trouble swallowing and voice change.    Eyes: Negative for redness and visual disturbance.   Respiratory: Negative for cough and wheezing.    Cardiovascular: Negative for chest pain and palpitations.   Gastrointestinal: Negative for abdominal pain, constipation, diarrhea, nausea and vomiting.   Genitourinary: Negative for urgency.   Musculoskeletal: Negative for joint swelling.   Skin: Positive for wound.   Neurological: Negative for syncope and headaches.   Hematological: Negative for adenopathy.   Psychiatric/Behavioral: Negative for sleep disturbance.       Objective   Physical Exam   Constitutional: He appears well-developed.   HENT:   Head: Normocephalic.   Eyes: Pupils are equal, round, and reactive to light.   Neck: Normal range of motion.   Cardiovascular: Normal rate.   Pulmonary/Chest: Effort normal.   Skin:   Right lateral calf shows a curvilinear laceration at least 5-7 cm with eschar and dried blood pressure is 16 what looks like 3 to 4-0 nylon sutures still gapping in places is not ready to sutures removed.  Minimal erythema   Psychiatric: He has a normal mood and affect. His speech is normal and behavior is normal.       Assessment/Plan   Xander was seen today for suture / staple removal.    Diagnoses and  all orders for this visit:    Wound of left lower extremity, initial encounter       Counseled on good wound care cream to help resolve some of the eschar recheck 3-4 days for removal and Steri-Strip

## 2019-04-01 ENCOUNTER — OFFICE VISIT (OUTPATIENT)
Dept: FAMILY MEDICINE CLINIC | Facility: CLINIC | Age: 79
End: 2019-04-01

## 2019-04-01 VITALS
DIASTOLIC BLOOD PRESSURE: 80 MMHG | BODY MASS INDEX: 28.41 KG/M2 | HEIGHT: 67 IN | SYSTOLIC BLOOD PRESSURE: 138 MMHG | WEIGHT: 181 LBS

## 2019-04-01 DIAGNOSIS — S80.12XA CONTUSION OF LEFT LOWER LEG, INITIAL ENCOUNTER: ICD-10-CM

## 2019-04-01 DIAGNOSIS — S81.802D WOUND OF LEFT LOWER EXTREMITY, SUBSEQUENT ENCOUNTER: Primary | ICD-10-CM

## 2019-04-01 PROCEDURE — 99213 OFFICE O/P EST LOW 20 MIN: CPT | Performed by: FAMILY MEDICINE

## 2019-04-01 NOTE — PROGRESS NOTES
Subjective   Xanderlong Wallace is a 78 y.o. male.     History of Present Illness follow-up above-mentioned wound.  Sutures need to be removed.  Also wants to look at ecchymoses above the leg.    The following portions of the patient's history were reviewed and updated as appropriate: allergies, current medications, past family history, past medical history, past social history, past surgical history and problem list.    Review of Systems   Constitutional: Negative for activity change, appetite change, fatigue and unexpected weight change.   HENT: Negative for trouble swallowing and voice change.    Eyes: Negative for redness and visual disturbance.   Respiratory: Negative for cough and wheezing.    Cardiovascular: Negative for chest pain and palpitations.   Gastrointestinal: Negative for abdominal pain, constipation, diarrhea, nausea and vomiting.   Genitourinary: Negative for urgency.   Musculoskeletal: Negative for joint swelling.   Skin: Positive for wound.   Neurological: Negative for syncope and headaches.   Hematological: Negative for adenopathy.   Psychiatric/Behavioral: Negative for sleep disturbance.       Objective   Physical Exam   Constitutional: He appears well-developed.   HENT:   Head: Normocephalic.   Eyes: Pupils are equal, round, and reactive to light.   Cardiovascular: Normal rate.   Skin:   Previous lower leg wound shows some mild gapping ex scar healing secondary intent in between stitches need to be removed.  Wounds above the leg with posterior thigh resolving ecchymoses from the fall normal range of motion   Psychiatric:   Hard of hearing       Assessment/Plan   Xander was seen today for suture / staple removal.    Diagnoses and all orders for this visit:    Wound of left lower extremity, subsequent encounter    Contusion of left lower leg, initial encounter       Sutures removed Steri-Strips placed counseled on keeping Steri-Strips in place to following up otherwise let wound heal by  secondary intent no manipulation for the ecchymoses observation keep regular appointment

## 2019-04-08 RX ORDER — DILTIAZEM HYDROCHLORIDE 180 MG/1
180 CAPSULE, COATED, EXTENDED RELEASE ORAL DAILY
Qty: 90 CAPSULE | Refills: 3 | Status: SHIPPED | OUTPATIENT
Start: 2019-04-08 | End: 2019-09-04 | Stop reason: SDUPTHER

## 2019-04-23 RX ORDER — CLOPIDOGREL BISULFATE 75 MG/1
TABLET ORAL
Qty: 90 TABLET | Refills: 0 | Status: SHIPPED | OUTPATIENT
Start: 2019-04-23 | End: 2019-05-03 | Stop reason: SDUPTHER

## 2019-05-03 RX ORDER — CLOPIDOGREL BISULFATE 75 MG/1
75 TABLET ORAL DAILY
Qty: 90 TABLET | Refills: 3 | Status: SHIPPED | OUTPATIENT
Start: 2019-05-03 | End: 2019-09-04 | Stop reason: SDUPTHER

## 2019-05-06 ENCOUNTER — OFFICE VISIT (OUTPATIENT)
Dept: FAMILY MEDICINE CLINIC | Facility: CLINIC | Age: 79
End: 2019-05-06

## 2019-05-06 VITALS
SYSTOLIC BLOOD PRESSURE: 122 MMHG | DIASTOLIC BLOOD PRESSURE: 76 MMHG | BODY MASS INDEX: 28.27 KG/M2 | WEIGHT: 180.1 LBS | HEIGHT: 67 IN

## 2019-05-06 DIAGNOSIS — I73.9 PERIPHERAL ARTERIAL DISEASE (HCC): ICD-10-CM

## 2019-05-06 DIAGNOSIS — I25.708 CORONARY ARTERY DISEASE INVOLVING CORONARY BYPASS GRAFT OF NATIVE HEART WITH OTHER FORMS OF ANGINA PECTORIS (HCC): Chronic | ICD-10-CM

## 2019-05-06 DIAGNOSIS — N18.2 CHRONIC RENAL INSUFFICIENCY, STAGE 2 (MILD): Chronic | ICD-10-CM

## 2019-05-06 DIAGNOSIS — G40.909 SEIZURE DISORDER (HCC): ICD-10-CM

## 2019-05-06 DIAGNOSIS — J41.8 MIXED SIMPLE AND MUCOPURULENT CHRONIC BRONCHITIS (HCC): Chronic | ICD-10-CM

## 2019-05-06 DIAGNOSIS — I10 ESSENTIAL HYPERTENSION: Primary | Chronic | ICD-10-CM

## 2019-05-06 PROCEDURE — 99214 OFFICE O/P EST MOD 30 MIN: CPT | Performed by: FAMILY MEDICINE

## 2019-05-06 NOTE — PROGRESS NOTES
Subjective   Xander Wallace is a 78 y.o. male.     History of Present Illness   follow-up hypertension vascular disease coronary disease history of seizure disorder etc.  In the interim the wound to the leg is healed.  Continues to be very active last lab work normal.  States visiting all subspecialist.  Again  need for summertime hydration and fall prevention.    The following portions of the patient's history were reviewed and updated as appropriate: allergies, current medications, past family history, past medical history, past social history, past surgical history and problem list.    Review of Systems   Constitutional: Negative for activity change, appetite change, fatigue and unexpected weight change.   HENT: Negative for trouble swallowing and voice change.    Eyes: Negative for redness and visual disturbance.   Respiratory: Negative for cough and wheezing.    Cardiovascular: Negative for chest pain and palpitations.   Gastrointestinal: Negative for abdominal pain, constipation, diarrhea, nausea and vomiting.   Genitourinary: Negative for urgency.   Musculoskeletal: Negative for joint swelling.   Neurological: Negative for syncope and headaches.   Hematological: Negative for adenopathy.   Psychiatric/Behavioral: Negative for sleep disturbance.       Objective   Physical Exam   Constitutional: He is oriented to person, place, and time. He appears well-developed.   HENT:   Head: Normocephalic.   Nose: Nose normal.   Eyes: Pupils are equal, round, and reactive to light.   Neck: Normal range of motion. No thyromegaly present.   Cardiovascular: Normal rate, regular rhythm, normal heart sounds and intact distal pulses. Exam reveals no gallop and no friction rub.   No murmur heard.  Pulmonary/Chest: He has rhonchi in the right lower field and the left lower field.   Abdominal: Soft. He exhibits no distension and no mass. There is no tenderness.   Musculoskeletal: Normal range of motion.    Neurological: He is alert and oriented to person, place, and time. He has normal reflexes.   Skin: Skin is warm and dry.   Healed   Psychiatric: He has a normal mood and affect. His speech is normal and behavior is normal. He is inattentive.       Assessment/Plan   Xander was seen today for hypertension.    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  Recheck 6 months be time for lab

## 2019-06-29 ENCOUNTER — APPOINTMENT (OUTPATIENT)
Dept: GENERAL RADIOLOGY | Facility: HOSPITAL | Age: 79
End: 2019-06-29

## 2019-06-29 ENCOUNTER — HOSPITAL ENCOUNTER (EMERGENCY)
Facility: HOSPITAL | Age: 79
Discharge: HOME | End: 2019-06-29
Attending: EMERGENCY MEDICINE | Admitting: EMERGENCY MEDICINE

## 2019-06-29 VITALS
WEIGHT: 170 LBS | SYSTOLIC BLOOD PRESSURE: 149 MMHG | BODY MASS INDEX: 25.76 KG/M2 | HEIGHT: 68 IN | TEMPERATURE: 98.7 F | HEART RATE: 64 BPM | DIASTOLIC BLOOD PRESSURE: 68 MMHG | RESPIRATION RATE: 17 BRPM | OXYGEN SATURATION: 98 %
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
## 2019-06-29 DIAGNOSIS — M25.461 EFFUSION OF RIGHT KNEE: Primary | ICD-10-CM

## 2019-06-29 DIAGNOSIS — S70.01XA CONTUSION OF RIGHT HIP, INITIAL ENCOUNTER: ICD-10-CM

## 2019-06-29 LAB
ALBUMIN SERPL-MCNC: 3.7 G/DL (ref 3.5–5.2)
ALBUMIN/GLOB SERPL: 1.3 G/DL
ALP SERPL-CCNC: 89 U/L (ref 39–117)
ALT SERPL W P-5'-P-CCNC: 13 U/L (ref 1–41)
ANION GAP SERPL CALCULATED.3IONS-SCNC: 11 MMOL/L (ref 5–15)
AST SERPL-CCNC: 16 U/L (ref 1–40)
BASOPHILS # BLD AUTO: 0.01 10*3/MM3 (ref 0–0.2)
BASOPHILS NFR BLD AUTO: 0.2 % (ref 0–1.5)
BILIRUB SERPL-MCNC: 0.3 MG/DL (ref 0.2–1.2)
BUN BLD-MCNC: 17 MG/DL (ref 8–23)
BUN/CREAT SERPL: 12.7 (ref 7–25)
CALCIUM SPEC-SCNC: 9 MG/DL (ref 8.6–10.5)
CHLORIDE SERPL-SCNC: 104 MMOL/L (ref 98–107)
CO2 SERPL-SCNC: 25 MMOL/L (ref 22–29)
CREAT BLD-MCNC: 1.34 MG/DL (ref 0.76–1.27)
CRP SERPL-MCNC: 0.68 MG/DL (ref 0–0.5)
D-LACTATE SERPL-SCNC: 1.1 MMOL/L (ref 0.5–2)
DEPRECATED RDW RBC AUTO: 43 FL (ref 37–54)
EOSINOPHIL # BLD AUTO: 0.14 10*3/MM3 (ref 0–0.4)
EOSINOPHIL NFR BLD AUTO: 2.4 % (ref 0.3–6.2)
ERYTHROCYTE [DISTWIDTH] IN BLOOD BY AUTOMATED COUNT: 13.7 % (ref 12.3–15.4)
GFR SERPL CREATININE-BSD FRML MDRD: 51 ML/MIN/1.73
GLOBULIN UR ELPH-MCNC: 2.8 GM/DL
GLUCOSE BLD-MCNC: 102 MG/DL (ref 65–99)
HCT VFR BLD AUTO: 28.3 % (ref 37.5–51)
HGB BLD-MCNC: 9.2 G/DL (ref 13–17.7)
HOLD SPECIMEN: NORMAL
IMM GRANULOCYTES # BLD AUTO: 0.04 10*3/MM3 (ref 0–0.05)
IMM GRANULOCYTES NFR BLD AUTO: 0.7 % (ref 0–0.5)
LYMPHOCYTES # BLD AUTO: 1.2 10*3/MM3 (ref 0.7–3.1)
LYMPHOCYTES NFR BLD AUTO: 20.6 % (ref 19.6–45.3)
MCH RBC QN AUTO: 28 PG (ref 26.6–33)
MCHC RBC AUTO-ENTMCNC: 32.5 G/DL (ref 31.5–35.7)
MCV RBC AUTO: 86 FL (ref 79–97)
MONOCYTES # BLD AUTO: 0.78 10*3/MM3 (ref 0.1–0.9)
MONOCYTES NFR BLD AUTO: 13.4 % (ref 5–12)
NEUTROPHILS # BLD AUTO: 3.65 10*3/MM3 (ref 1.7–7)
NEUTROPHILS NFR BLD AUTO: 62.7 % (ref 42.7–76)
NRBC BLD AUTO-RTO: 0 /100 WBC (ref 0–0.2)
PLATELET # BLD AUTO: 187 10*3/MM3 (ref 140–450)
PMV BLD AUTO: 9.4 FL (ref 6–12)
POTASSIUM BLD-SCNC: 4.6 MMOL/L (ref 3.5–5.2)
PROT SERPL-MCNC: 6.5 G/DL (ref 6–8.5)
RBC # BLD AUTO: 3.29 10*6/MM3 (ref 4.14–5.8)
SODIUM BLD-SCNC: 140 MMOL/L (ref 136–145)
WBC NRBC COR # BLD: 5.82 10*3/MM3 (ref 3.4–10.8)
WHOLE BLOOD HOLD SPECIMEN: NORMAL

## 2019-06-29 PROCEDURE — 25010000002 ONDANSETRON PER 1 MG: Performed by: EMERGENCY MEDICINE

## 2019-06-29 PROCEDURE — 85025 COMPLETE CBC W/AUTO DIFF WBC: CPT | Performed by: EMERGENCY MEDICINE

## 2019-06-29 PROCEDURE — 86140 C-REACTIVE PROTEIN: CPT | Performed by: EMERGENCY MEDICINE

## 2019-06-29 PROCEDURE — 73060 X-RAY EXAM OF HUMERUS: CPT

## 2019-06-29 PROCEDURE — 83605 ASSAY OF LACTIC ACID: CPT | Performed by: EMERGENCY MEDICINE

## 2019-06-29 PROCEDURE — 73502 X-RAY EXAM HIP UNI 2-3 VIEWS: CPT

## 2019-06-29 PROCEDURE — 99284 EMERGENCY DEPT VISIT MOD MDM: CPT

## 2019-06-29 PROCEDURE — 96374 THER/PROPH/DIAG INJ IV PUSH: CPT

## 2019-06-29 PROCEDURE — 73564 X-RAY EXAM KNEE 4 OR MORE: CPT

## 2019-06-29 PROCEDURE — 80053 COMPREHEN METABOLIC PANEL: CPT | Performed by: EMERGENCY MEDICINE

## 2019-06-29 PROCEDURE — 25010000002 MORPHINE PER 10 MG: Performed by: EMERGENCY MEDICINE

## 2019-06-29 PROCEDURE — 96375 TX/PRO/DX INJ NEW DRUG ADDON: CPT

## 2019-06-29 RX ORDER — HYDROCODONE BITARTRATE AND ACETAMINOPHEN 7.5; 325 MG/1; MG/1
1 TABLET ORAL ONCE
Status: COMPLETED | OUTPATIENT
Start: 2019-06-29 | End: 2019-06-29

## 2019-06-29 RX ORDER — ONDANSETRON 2 MG/ML
4 INJECTION INTRAMUSCULAR; INTRAVENOUS ONCE
Status: COMPLETED | OUTPATIENT
Start: 2019-06-29 | End: 2019-06-29

## 2019-06-29 RX ORDER — HYDROCODONE BITARTRATE AND ACETAMINOPHEN 5; 325 MG/1; MG/1
1 TABLET ORAL EVERY 8 HOURS PRN
Qty: 10 TABLET | Refills: 0 | Status: SHIPPED | OUTPATIENT
Start: 2019-06-29 | End: 2019-11-04

## 2019-06-29 RX ADMIN — MORPHINE SULFATE 4 MG: 4 INJECTION INTRAVENOUS at 15:37

## 2019-06-29 RX ADMIN — ONDANSETRON 4 MG: 2 INJECTION INTRAMUSCULAR; INTRAVENOUS at 15:37

## 2019-06-29 RX ADMIN — HYDROCODONE BITARTRATE AND ACETAMINOPHEN 1 TABLET: 7.5; 325 TABLET ORAL at 18:10

## 2019-07-09 ENCOUNTER — OFFICE VISIT (OUTPATIENT)
Dept: ORTHOPEDIC SURGERY | Facility: CLINIC | Age: 79
End: 2019-07-09

## 2019-07-09 VITALS
TEMPERATURE: 97.5 F | HEART RATE: 69 BPM | WEIGHT: 177 LBS | RESPIRATION RATE: 16 BRPM | OXYGEN SATURATION: 97 % | SYSTOLIC BLOOD PRESSURE: 110 MMHG | BODY MASS INDEX: 26.83 KG/M2 | HEIGHT: 68 IN | DIASTOLIC BLOOD PRESSURE: 74 MMHG

## 2019-07-09 DIAGNOSIS — M25.561 ACUTE PAIN OF RIGHT KNEE: Primary | ICD-10-CM

## 2019-07-09 DIAGNOSIS — M25.461 EFFUSION OF RIGHT KNEE: ICD-10-CM

## 2019-07-09 PROCEDURE — 99202 OFFICE O/P NEW SF 15 MIN: CPT | Performed by: ORTHOPAEDIC SURGERY

## 2019-07-09 NOTE — PROGRESS NOTES
Xander Wallace is a 79 y.o. male   Primary provider:  Deni Garcia MD       Chief Complaint   Patient presents with   • Right Knee - Pain     X-rays @ [facility]  HISTORY OF PRESENT ILLNESS: Patient states he fell going in the door at the [employer]  on 05-24-19 with injury to right knee.  Pain scale today 0/10     This is the first office visit for evaluation of an injury to the right knee.    Mr. Wallace is 79 years old.  He fell on 29 June.  He describes landing forward onto the front of his right knee.  He also scraped his elbow at the time.  He had prompt onset of pain and swelling in the knee.  He denies any previous problems with the knee.  His pain is been well localized to the anterior aspect of the knee.  He admits this has significantly improved.  He was seen in the emergency room and x-rays were performed.  Treatment has included activity restriction.  His wife says that he is an unsteady ambulator and uses a walker and occasionally wheelchair at home.  He is a limited community ambulator.    Home medications include albuterol Symbicort Plavix diltiazem and lisinopril among others.  Allergic to amoxicillin and doxycycline as well as cefuroxime.  He does not smoke.  Past medical history is remarkable for coronary artery disease emphysema and aortic stenosis.  His wife also says that he has been diagnosed as having peripheral vascular disease in the right lower extremity which is requiring no active treatment.    Dr. Garcia is asked that I see him for evaluation and treatment.    Pain   This is a new problem. The current episode started more than 1 month ago. The problem occurs intermittently (severe). The problem has been gradually worsening. Associated symptoms include joint swelling. Associated symptoms comments: Aching pain and swelling right knee. The symptoms are aggravated by standing and walking (driving). He has tried ice and rest for the symptoms. The treatment provided no relief.         CONCURRENT MEDICAL HISTORY:    Past Medical History:   Diagnosis Date   • Aortic stenosis    • Asthma    • COPD (chronic obstructive pulmonary disease) (CMS/[facility])    • Coronary artery disease involving coronary bypass graft     CABG 2012, stent 2016, stent 2005   • Emphysema of lung (CMS/[facility])    • Hyperlipidemia    • Hypertension    • PVD (peripheral vascular disease) (CMS/[facility])    • Seizure disorder (CMS/[facility])    • Skin cancer of lip    • Sleep apnea        Allergies   Allergen Reactions   • Doxycycline    • Amoxicillin    • Ceftin [Cefuroxime]    • Methylphenidate Derivatives    • Nystatin    • Sulfur    • Theophyllines          Current Outpatient Medications:   •  albuterol (PROVENTIL HFA;VENTOLIN HFA) 108 (90 Base) MCG/ACT inhaler, Inhale 2 puffs Every 4 (Four) Hours As Needed for Wheezing., Disp: 3 inhaler, Rfl: 3  •  B Complex-Biotin-FA (SUPER B-COMPLEX) tablet, Take  by mouth., Disp: , Rfl:   •  budesonide-formoterol (SYMBICORT) 160-4.5 MCG/ACT inhaler, Inhale 2 puffs 2 (Two) Times a Day., Disp: , Rfl:   •  clopidogrel (PLAVIX) 75 MG tablet, Take 1 tablet by mouth Daily., Disp: 90 tablet, Rfl: 3  •  diltiaZEM CD (CARDIZEM CD) 180 MG 24 hr capsule, Take 1 capsule by mouth Daily., Disp: 90 capsule, Rfl: 3  •  gabapentin (NEURONTIN) 600 MG tablet, Take 600 mg by mouth 3 (three) times a day., Disp: , Rfl:   •  lamoTRIgine (LaMICtal) 200 MG tablet, Take 200 mg by mouth daily., Disp: , Rfl:   •  lisinopril (PRINIVIL,ZESTRIL) 20 MG tablet, Take 1 tablet by mouth Daily., Disp: 90 tablet, Rfl: 3  •  nitroglycerin (NITROSTAT) 0.4 MG SL tablet, 1 under the tongue as needed for angina, may repeat q5mins for up three doses, Disp: 25 tablet, Rfl: 12  •  Omega-3 Fatty Acids (FISH OIL) 1000 MG capsule capsule, Take  by mouth daily with breakfast., Disp: , Rfl:   •  rosuvastatin (CRESTOR) 20 MG tablet, Take 1 tablet by mouth Daily., Disp: 90 tablet, Rfl: 3  •  tamsulosin (FLOMAX) 0.4 MG capsule 24 hr capsule, Take 1 capsule by  "mouth Every Night., Disp: 90 capsule, Rfl: 3  •  HYDROcodone-acetaminophen (NORCO) 5-325 MG per tablet, Take 1 tablet by mouth Every 8 (Eight) Hours As Needed for Moderate Pain  for up to 10 doses., Disp: 10 tablet, Rfl: 0  •  mupirocin (BACTROBAN) 2 % cream, Apply  topically to the appropriate area as directed 3 (Three) Times a Day., Disp: 30 g, Rfl: 1  •  triamcinolone (KENALOG) 0.5 % cream, , Disp: , Rfl:     Past Surgical History:   Procedure Laterality Date   • APPENDECTOMY     • CARDIAC SURGERY      CABG 2012   • CAROTID STENT     • COLON SURGERY     • CORONARY ARTERY BYPASS GRAFT     • HEMORRHOIDECTOMY     • HERNIA REPAIR         Family History   Problem Relation Age of Onset   • Cancer Father        Social History     Socioeconomic History   • Marital status:      Spouse name: Not on file   • Number of children: Not on file   • Years of education: Not on file   • Highest education level: Not on file   Tobacco Use   • Smoking status: Former Smoker   • Smokeless tobacco: Never Used   Substance and Sexual Activity   • Alcohol use: No   • Drug use: No   • Sexual activity: Defer        Review of Systems   Musculoskeletal: Positive for joint swelling.        Right knee pain   Hematological: Bruises/bleeds easily.     Review of symptoms is remarkable for the knee pain as noted in history of present illness.  PHYSICAL EXAMINATION:       /74 (BP Location: Left arm)   Pulse 69   Temp 97.5 °F (36.4 °C)   Resp 16   Ht 172.7 cm (68\")   Wt 80.3 kg (177 lb)   SpO2 97%   BMI 26.91 kg/m²     Physical Exam he is an elderly man alert pleasant and in no apparent distress.  He and his wife are both delightful but quite circumstantial.  He responds appropriately to questions and commands.    GAIT:     []  Normal  [x]  Antalgic    Assistive device: [x]  None  []  Walker     []  Crutches  []  Cane     []  Wheelchair  []  Stretcher    Ortho Exam he walks with a slightly antalgic gait favoring the right.  There is " moderate varus of the right knee which the wife says is chronic in nature.  There is a 1+ effusion of the knee.  There is firm edema over the anterior aspect of the knee.  Skin is otherwise unremarkable.  The foot is warm.  I am unable to palpate any pulses in the foot knee or groin.  There is 1-2+ laxity on valgus stressing of the knee with no associated pain.  Knee motion is smooth.  Motion of the knee is smooth from 10 to 80 degrees.    Radiographs of the knee dated 29 June of this year were reviewed.  There is tricompartmental degenerative disease most prominent medially.  There is also moderate vascular Calcification in the thigh.          Xr Humerus Right    Result Date: 6/29/2019  Narrative: EXAM:  Radiograph(s), Osseous       REGION:   Humerus  SIDE:     Right   VIEWS:   2         INDICATION:    fall   COMPARISON:    none          FINDINGS:       No evidence of a fracture or bony malalignment.   Age-related degenerative changes.                         .        Impression: CONCLUSION:       1. No radiographic evidence of acute osseous pathology.          Note:  if pain or symptoms persist beyond reasonable expectations and follow-up imaging is anticipated,  cross sectional imaging (CT and/or MRI) is suggested, as is deemed clinically appropriate.            Electronically signed by:  AUDREY Virk MD  6/29/2019 3:20 PM CDT Workstation: [id number]    Xr Knee 4+ View Right    Result Date: 6/29/2019  Narrative: EXAM:  Radiograph(s), Osseous       REGION:   Knee  SIDE:     Right   VIEWS:   4         INDICATION:    effusion, swelling   COMPARISON:    none          FINDINGS:       No evidence of a fracture or bony malalignment.   Small suprapatellar fluid collection. Soft tissue swelling involving the cutaneous/subcutaneous regions anteriorly. Intra-articular osseous foreign body (flabella). .        Impression: CONCLUSION:       1. No radiographic evidence of acute osseous pathology.    Recommend  contrast-enhanced MRI.                Electronically signed by:  AUDREY Virk MD  6/29/2019 3:22 PM CDT Workstation: [id number]    Xr Hip With Or Without Pelvis 2 - 3 View Right    Result Date: 6/29/2019  Narrative: EXAM:  Radiograph(s), Osseous       REGION:   Pelvis and right hip  VIEWS:   3         INDICATION:    fall   COMPARISON:    none          FINDINGS:       No evidence of a fracture or bony malalignment.   No evidence of osteolytic/osteoblastic disease.   Age-related degenerative changes.                         .        Impression: CONCLUSION:       1. No radiographic evidence of acute osseous pathology.          Note:  if pain or symptoms persist beyond reasonable expectations and follow-up imaging is anticipated,  cross sectional imaging (CT and/or MRI) is suggested, as is deemed clinically appropriate.            Electronically signed by:  AUDREY Virk MD  6/29/2019 3:21 PM CDT Workstation: [id number]          ASSESSMENT: Right knee pain secondary to hemarthrosis, resolving.    The prognosis is excellent for a full recovery.  He may advance activities as tolerated.  At this point I see no benefits to injection therapy.  If his symptoms do not steadily improve over the next 3 to 4 weeks he will call for reevaluation.    Diagnoses and all orders for this visit:    Acute pain of right knee    Effusion of right knee          PLAN    Return if symptoms worsen or fail to improve.    Stan Ramon MD

## 2019-08-05 RX ORDER — LISINOPRIL 20 MG/1
20 TABLET ORAL DAILY
Qty: 90 TABLET | Refills: 3 | Status: SHIPPED | OUTPATIENT
Start: 2019-08-05 | End: 2019-09-04 | Stop reason: SDUPTHER

## 2019-08-05 RX ORDER — LISINOPRIL 20 MG/1
TABLET ORAL
Qty: 90 TABLET | Refills: 0 | OUTPATIENT
Start: 2019-08-05

## 2019-08-19 DIAGNOSIS — I25.810 CORONARY ARTERY DISEASE INVOLVING CORONARY BYPASS GRAFT OF NATIVE HEART WITHOUT ANGINA PECTORIS: Primary | Chronic | ICD-10-CM

## 2019-09-03 ENCOUNTER — OFFICE VISIT (OUTPATIENT)
Dept: CARDIOLOGY | Facility: CLINIC | Age: 79
End: 2019-09-03

## 2019-09-03 VITALS
SYSTOLIC BLOOD PRESSURE: 116 MMHG | HEART RATE: 62 BPM | OXYGEN SATURATION: 99 % | DIASTOLIC BLOOD PRESSURE: 78 MMHG | BODY MASS INDEX: 26.52 KG/M2 | WEIGHT: 175 LBS | HEIGHT: 68 IN

## 2019-09-03 DIAGNOSIS — I35.0 NONRHEUMATIC AORTIC VALVE STENOSIS: Primary | ICD-10-CM

## 2019-09-03 DIAGNOSIS — I25.810 CORONARY ARTERY DISEASE INVOLVING CORONARY BYPASS GRAFT OF NATIVE HEART WITHOUT ANGINA PECTORIS: Primary | Chronic | ICD-10-CM

## 2019-09-03 PROCEDURE — 93005 ELECTROCARDIOGRAM TRACING: CPT | Performed by: INTERNAL MEDICINE

## 2019-09-03 PROCEDURE — 99214 OFFICE O/P EST MOD 30 MIN: CPT | Performed by: INTERNAL MEDICINE

## 2019-09-03 NOTE — PROGRESS NOTES
[facility] Cardiology  OFFICE NOTE    Cardiovascular Medicine  Mike Montes M.D., RPVI         No referring provider defined for this encounter.    Thank you for asking me to see Xander Chandan Rodrigo for CAD.    History of Present Illness  This is a 79 y.o. male with:    78 y.o. male     12/18/2018  1. Coronary artery disease involving coronary bypass graft of native heart without angina pectoris    2. Chronic renal insufficiency, stage 2 (mild)    3. Mixed hyperlipidemia    4. Peripheral arterial disease (CMS/HCC)    5. Essential hypertension          Chief complaint -Follow-up CAD        History of present Illness- 78 yr old gentleman with history of coronary disease prior CABG in the past and it has history of peripheral vascular disease with total occlusion of the SFA.  Patient had failed attempted PTA of the right SFA with antegrade retrograde approach. He has claudication pain With walking  2-3 blocks, but no rest pain but does not want to do any PTA at this time and he will continue to walk as much as he can.  He denies any chest pain.  He has shortness of breath secondary to COPD and is seeing a pulmonologist in [place].  He is on multiple inhalers.  He denies any TIA symptoms of GI symptoms.He stays active as much as he can  and he gets around with a walking stick.  He denies any CNS symptoms.  He had frequent falls however those falls in setting of tripping.  Denied any chest pain.            Review of Systems - ROS  Constitution: Negative for weakness, weight gain and weight loss.   HENT: Negative for congestion.    Eyes: Negative for blurred vision.   Cardiovascular: As mentioned above  Respiratory: Negative for cough and hemoptysis.    Endocrine: Negative for polydipsia and polyuria.   Hematologic/Lymphatic: Negative for bleeding problem. Does not bruise/bleed easily.   Skin: Negative for flushing.   Musculoskeletal: Negative for neck pain and stiffness.   Gastrointestinal:  Negative for abdominal pain, diarrhea, jaundice, melena, nausea and vomiting.   Genitourinary: Negative for dysuria and hematuria.   Neurological: Negative for dizziness, focal weakness and numbness.   Psychiatric/Behavioral: Negative for altered mental status and depression.          All other systems were reviewed and were negative.    family history includes Cancer in his father.     reports that he has quit smoking. He has never used smokeless tobacco. He reports that he does not drink alcohol or use drugs.    Allergies   Allergen Reactions   • Doxycycline    • Amoxicillin    • Ceftin [Cefuroxime]    • Methylphenidate Derivatives    • Nystatin    • Sulfur    • Theophyllines          Current Outpatient Medications:   •  albuterol (PROVENTIL HFA;VENTOLIN HFA) 108 (90 Base) MCG/ACT inhaler, Inhale 2 puffs Every 4 (Four) Hours As Needed for Wheezing., Disp: 3 inhaler, Rfl: 3  •  B Complex-Biotin-FA (SUPER B-COMPLEX) tablet, Take  by mouth., Disp: , Rfl:   •  budesonide-formoterol (SYMBICORT) 160-4.5 MCG/ACT inhaler, Inhale 2 puffs 2 (Two) Times a Day., Disp: , Rfl:   •  clopidogrel (PLAVIX) 75 MG tablet, Take 1 tablet by mouth Daily., Disp: 90 tablet, Rfl: 3  •  diltiaZEM CD (CARDIZEM CD) 180 MG 24 hr capsule, Take 1 capsule by mouth Daily., Disp: 90 capsule, Rfl: 3  •  gabapentin (NEURONTIN) 600 MG tablet, Take 600 mg by mouth 3 (three) times a day., Disp: , Rfl:   •  HYDROcodone-acetaminophen (NORCO) 5-325 MG per tablet, Take 1 tablet by mouth Every 8 (Eight) Hours As Needed for Moderate Pain  for up to 10 doses., Disp: 10 tablet, Rfl: 0  •  lamoTRIgine (LaMICtal) 200 MG tablet, Take 200 mg by mouth daily., Disp: , Rfl:   •  lisinopril (PRINIVIL,ZESTRIL) 20 MG tablet, Take 1 tablet by mouth Daily., Disp: 90 tablet, Rfl: 3  •  mupirocin (BACTROBAN) 2 % cream, Apply  topically to the appropriate area as directed 3 (Three) Times a Day., Disp: 30 g, Rfl: 1  •  nitroglycerin (NITROSTAT) 0.4 MG SL tablet, 1 under the  "tongue as needed for angina, may repeat q5mins for up three doses, Disp: 25 tablet, Rfl: 12  •  Omega-3 Fatty Acids (FISH OIL) 1000 MG capsule capsule, Take  by mouth daily with breakfast., Disp: , Rfl:   •  rosuvastatin (CRESTOR) 20 MG tablet, Take 1 tablet by mouth Daily., Disp: 90 tablet, Rfl: 3  •  tamsulosin (FLOMAX) 0.4 MG capsule 24 hr capsule, Take 1 capsule by mouth Every Night., Disp: 90 capsule, Rfl: 3  •  triamcinolone (KENALOG) 0.5 % cream, , Disp: , Rfl:     Physical Exam:  Vitals:    09/03/19 1253   BP: 116/78   BP Location: Left arm   Patient Position: Sitting   Cuff Size: Adult   Pulse: 62   SpO2: 99%   Weight: 79.4 kg (175 lb)   Height: 172.7 cm (67.99\")     Current Pain Level: none  Pulse Ox: Normal  on room air  General: alert, appears stated age and cooperative     Body Habitus: well-nourished    HEENT: Head: Normocephalic, no lesions, without obvious abnormality. No arcus senilis, xanthelasma or xanthomas.    Neuro: alert, oriented x3  Pulses: 2+ and symmetric  JVP: Volume/Pulsation: Normal.  Normal waveforms.   Appropriate inspiratory decrease.  No Kussmaul's. No Rupal's.   Carotid Exam: no bruit normal pulsation bilaterally   Carotid Volume: normal.     Respirations: no increased work of breathing   Chest:  Normal    Pulmonary:Normal   Precordium: Normal impulses. P2 is not palpable.  RV Heave: absent  LV Heave: absent  [place]:  normal size and placement  Palpable S4: absent.  Heart rate: normal    Heart Rhythm: regular     Heart Sounds: S1: normal  S2: normal  S3: absent   S4: absent  Ejection systolic murmur audible.   Pericardial Rub:  Absent: .    Abdomen:   Appearance: normal .  Palpation: Soft, non-tender to palpation, bowel sounds positive in all four quadrants; no guarding or rebound tenderness  Extremity: no edema.   LE Skin: no rashes  LE Hair:  normal  LE Pulses: well perfused with normal pulses in the distal extremities  Pallor on elevation: Absent. Rubor on dependency: " None      DATA REVIEWED:     EKG. I personally reviewed and interpreted the EKG.  Sinus rhythm with nonspecific ST-T changes.      ECG/EMG Results (all)     None        ---------------------------------------------------  TTE/ADAIR:  Results for orders placed during the hospital encounter of 16   Echo - Converted    [facility]                               ECHOCARDIOGRAM        NAME:  MISSAEL COSTELLO   UNIT #:  [id number]  :  [id number]                  ACCT #:  [id number]  SEX:  M                           ROOM:  323 1        DATE:  2016  PRIMARY CARE PHYSICIAN:    ORDERING DOCTOR:                             MAYELIN RIDDLE MD  REASON FOR ECHO:  Chest    INTERPRETING  pain                       CARDIOLOGIST: Rachid Corona MD  PRIOR ECHO:  N             DATE OF PRIOR ECHO:  PATIENT PROCEDURE #:       LOCATION OF STUDY:  [facility]  [id number]  DICTATED:  2016      TRANSCRIBED:  0814                       2016 1011                            QUANTITATIVE MEASUREMENTS     LA =     40   (<40)      LA/AO Ratio   1.3  Ao =     32   (<37)      AoV Open      15    (>15)  IVS=     16   (<11)      LVPW          16    (<11)  LVEDd=   33   (38-56)    LVEDs         19    (22-40)  RV=           (<26)      LVOT          2.0  EF=      70   %          %F.S.               (24-46)     Max. Pulmonary Gradient   3   mmHg  RV Systolic Pressure          mmHg     MV Area                 cm2  Mean MV Gradient   5    mmHg  MV p 1/2 t         84   msec     SHAHIDA (Doppler)      1.7   cm2  SHAHIDA (Planimeter)         cm2  Max. AV Gradient   33    mmHg  Mean AV Gradient   20    mmHg  AI p 1/2 t               msec        PROCEDURE PERFORMED:  Doppler, 2D, and M-mode echocardiographic study.     FINDINGS:  Left atrium appears to be mildly dilated or upper limits of  normal in size. The aortic valve appears moderately sclerotic with  aortic valve area by  Doppler methodology of 1.7 with a mean gradient of  20 and maximum 33 consistent with mild aortic stenosis. There is  moderate concentric left ventricular hypertrophy with early diastolic  dysfunction. No regional wall motion abnormality. Estimated ejection  fraction is greater than 60%. Normal right ventricular size and  function. There is mild to moderate mitral annular calcification.  Tricuspid valve is not well visualized. However, there is no  hemodynamically significant tricuspid stenosis. No significant  regurgitation seen on color flow Doppler across the valves. 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 No intracardiac mass, pericardial  effusion, or cardiac thrombus seen.     CLINICAL IMPRESSION:  1.   Moderate concentric left ventricular hypertrophy with early       diastolic dysfunction.  2.   Estimated ejection fraction greater than 60%.  3.   Moderately sclerotic aortic valve with mild aortic stenosis based       on the Doppler methodology with valve area of 1.7 with a mean       gradient of 20 and maximum 33.  4.   There is marked mitral annular calcification without mitral       regurgitation  5.   Tricuspid valve is not well visualized. However, there is no       hemodynamic aortic stenosis based on the Doppler methodology.  6.   Normal right ventricular size and function.  7.   No intracardiac mass, pericardial effusion, or cardiac thrombus       seen.  8.   Pulmonic valve is not well visualized.        JOSHUA EL MD  Electronically Signed  02/25/2016 14:53:02  By JOSHUA EL MD MA/kevin/donna  [id number]  cc:   MD JOSHUA DYER MD                               ECHOCARDIOGRAM  Page #page         --------------------------------------------------------------------------------------------------  LABS:     The CVD Risk score (Smithino, et al., 2008) failed to calculate for the following reasons:    The 2008 CVD risk score is only valid for ages 30 to 74    The patient has  a prior MI, stroke, CHF, or peripheral vascular disease diagnosis         Lab Results   Component Value Date    GLUCOSE 102 (H) 06/29/2019    BUN 17 06/29/2019    CREATININE 1.34 (H) 06/29/2019    EGFRIFNONA 51 (L) 06/29/2019    BCR 12.7 06/29/2019    K 4.6 06/29/2019    CO2 25.0 06/29/2019    CALCIUM 9.0 06/29/2019    ALBUMIN 3.70 06/29/2019    AST 16 06/29/2019    ALT 13 06/29/2019     Lab Results   Component Value Date    WBC 5.82 06/29/2019    HGB 9.2 (L) 06/29/2019    HCT 28.3 (L) 06/29/2019    MCV 86.0 06/29/2019     06/29/2019     Lab Results   Component Value Date    CHOL 113 07/06/2017    CHLPL 114 05/04/2016    TRIG 57 07/06/2017    HDL 58 (L) 07/06/2017    LDL 48 07/06/2017     Lab Results   Component Value Date    TSH 2.87 02/23/2016     Lab Results   Component Value Date    CKTOTAL 69 02/22/2016    CKMB 2.8 02/22/2016    TROPONINI 1.350 (H) 02/24/2016     Lab Results   Component Value Date    HGBA1C 5.3 05/04/2016     No results found for: DDIMER  Lab Results   Component Value Date    ALT 13 06/29/2019     Lab Results   Component Value Date    HGBA1C 5.3 05/04/2016     Lab Results   Component Value Date    CREATININE 1.34 (H) 06/29/2019     No results found for: IRON, TIBC, FERRITIN  Lab Results   Component Value Date    INR 0.9 08/23/2016    INR 1.0 05/04/2016    INR 1.0 04/16/2015    PROTIME 12.4 08/23/2016    PROTIME 12.7 05/04/2016    PROTIME 12.7 04/16/2015       Assessment/Plan     1. CAD status post CABG in the past, doing well no chest pain ,continue the Plavix as he is bruising easily.  He is on Cardizem CD for blood pressure as he cannot tolerate beta blockers due to wheezing.     2. Peripheral vascular disease-status post occlusion of the SFA has no critical limb ischemia, still walking but does have claudication With walking I asked him to walk more.And stop when the pain gets worse and walk again when the pain goes away.  His most limitations due to COPD with shortness of breath.   I  thought about starting him on cilostazol but he is significant bruising as well as recurrent falls when he is working in his yard.  Patient is able to perform his activities of daily living without any limitations from claudication.     3. Hypertension well controlled with lisinopril and Cardizem CD and takes Crestor for hyperlipidemia.     4. COPD with wheezing on Atrovent and Dulera .      5.  Aortic stenosis:  Repeat echocardiogram to assess progression of aortic stenosis.    Prevention:  Patient's Body mass index is 26.61 kg/m². BMI is above normal parameters. Recommendations include: exercise counseling.      Xander Wallace is an ex smoker    AAA Screening:   Not needed    Return in about 6 months (around 3/3/2020).          This document has been electronically signed by Mike Montes MD on September 3, 2019 5:09 PM

## 2019-09-04 RX ORDER — ROSUVASTATIN CALCIUM 20 MG/1
20 TABLET, COATED ORAL DAILY
Qty: 90 TABLET | Refills: 3 | Status: SHIPPED | OUTPATIENT
Start: 2019-09-04 | End: 2020-09-21 | Stop reason: SDUPTHER

## 2019-09-04 RX ORDER — CLOPIDOGREL BISULFATE 75 MG/1
75 TABLET ORAL DAILY
Qty: 90 TABLET | Refills: 3 | Status: SHIPPED | OUTPATIENT
Start: 2019-09-04 | End: 2020-05-14 | Stop reason: SDUPTHER

## 2019-09-04 RX ORDER — DILTIAZEM HYDROCHLORIDE 180 MG/1
180 CAPSULE, COATED, EXTENDED RELEASE ORAL DAILY
Qty: 90 CAPSULE | Refills: 3 | Status: SHIPPED | OUTPATIENT
Start: 2019-09-04 | End: 2020-07-20

## 2019-09-04 RX ORDER — LISINOPRIL 20 MG/1
20 TABLET ORAL DAILY
Qty: 90 TABLET | Refills: 3 | Status: SHIPPED | OUTPATIENT
Start: 2019-09-04 | End: 2020-07-20

## 2019-09-17 ENCOUNTER — TELEPHONE (OUTPATIENT)
Dept: CARDIOLOGY | Facility: CLINIC | Age: 79
End: 2019-09-17

## 2019-11-04 ENCOUNTER — OFFICE VISIT (OUTPATIENT)
Dept: FAMILY MEDICINE CLINIC | Facility: CLINIC | Age: 79
End: 2019-11-04

## 2019-11-04 ENCOUNTER — APPOINTMENT (OUTPATIENT)
Dept: LAB | Facility: HOSPITAL | Age: 79
End: 2019-11-04

## 2019-11-04 VITALS
WEIGHT: 181.2 LBS | BODY MASS INDEX: 27.46 KG/M2 | DIASTOLIC BLOOD PRESSURE: 60 MMHG | HEIGHT: 68 IN | SYSTOLIC BLOOD PRESSURE: 115 MMHG

## 2019-11-04 DIAGNOSIS — D50.0 IRON DEFICIENCY ANEMIA DUE TO CHRONIC BLOOD LOSS: ICD-10-CM

## 2019-11-04 DIAGNOSIS — G40.909 SEIZURE DISORDER (HCC): Chronic | ICD-10-CM

## 2019-11-04 DIAGNOSIS — J41.8 MIXED SIMPLE AND MUCOPURULENT CHRONIC BRONCHITIS (HCC): Chronic | ICD-10-CM

## 2019-11-04 DIAGNOSIS — I10 ESSENTIAL HYPERTENSION: Primary | Chronic | ICD-10-CM

## 2019-11-04 DIAGNOSIS — N18.2 CHRONIC RENAL INSUFFICIENCY, STAGE 2 (MILD): Chronic | ICD-10-CM

## 2019-11-04 PROBLEM — M25.561 ACUTE PAIN OF RIGHT KNEE: Status: RESOLVED | Noted: 2019-07-09 | Resolved: 2019-11-04

## 2019-11-04 LAB
HCT VFR BLD AUTO: 19.4 % (ref 37.5–51)
HGB BLD-MCNC: 6.2 G/DL (ref 13–17.7)
IRON 24H UR-MRATE: 13 MCG/DL (ref 59–158)
RETICS # AUTO: 0.04 10*6/MM3 (ref 0.02–0.13)
RETICS/RBC NFR AUTO: 1.68 % (ref 0.7–1.9)
VIT B12 BLD-MCNC: 544 PG/ML (ref 211–946)

## 2019-11-04 PROCEDURE — 85018 HEMOGLOBIN: CPT | Performed by: FAMILY MEDICINE

## 2019-11-04 PROCEDURE — 36415 COLL VENOUS BLD VENIPUNCTURE: CPT | Performed by: FAMILY MEDICINE

## 2019-11-04 PROCEDURE — 83540 ASSAY OF IRON: CPT | Performed by: FAMILY MEDICINE

## 2019-11-04 PROCEDURE — 82607 VITAMIN B-12: CPT | Performed by: FAMILY MEDICINE

## 2019-11-04 PROCEDURE — 99214 OFFICE O/P EST MOD 30 MIN: CPT | Performed by: FAMILY MEDICINE

## 2019-11-04 PROCEDURE — 85014 HEMATOCRIT: CPT | Performed by: FAMILY MEDICINE

## 2019-11-04 PROCEDURE — 85045 AUTOMATED RETICULOCYTE COUNT: CPT | Performed by: FAMILY MEDICINE

## 2019-11-04 NOTE — PROGRESS NOTES
Subjective   Xander Wallace is a 79 y.o. male.     History of Present Illness   follow-up of vascular disease hypertension seizure disorder etc.  In the interim had lab work drawn by neurology for his seizure disorder found to have anemia of hemoglobin of 8.  According to our records this is down even from his last hematocrit of 28 7 months ago.  Has not noticed no gross bleeding anywhere but is at high risk for slow GI bleeds.  Is minimally symptomatic.  Does have some increase in falling.  Medicines otherwise have remained the same.  Has had a flu vaccine.  HPI    The following portions of the patient's history were reviewed and updated as appropriate: allergies, current medications, past family history, past medical history, past social history, past surgical history and problem list.    Review of Systems  Review of Systems   Constitutional: Negative for activity change, appetite change, fatigue and unexpected weight change.   HENT: Negative for trouble swallowing and voice change.    Eyes: Negative for redness and visual disturbance.   Respiratory: Negative for cough and wheezing.    Cardiovascular: Negative for chest pain and palpitations.   Gastrointestinal: Negative for abdominal pain, constipation, diarrhea, nausea and vomiting.   Genitourinary: Negative for urgency.   Musculoskeletal: Negative for joint swelling.   Neurological: Positive for weakness (Generalized chronic vague). Negative for syncope and headaches.   Hematological: Negative for adenopathy.   Psychiatric/Behavioral: Negative for sleep disturbance.       Objective   Physical Exam  Physical Exam   Constitutional: He is oriented to person, place, and time. He appears well-developed.   HENT:   Head: Normocephalic.   Nose: Nose normal.   Eyes: Pupils are equal, round, and reactive to light.   Neck: Normal range of motion. No thyromegaly present.   Cardiovascular: Normal rate, regular rhythm, normal heart sounds and intact distal pulses.  "Exam reveals no gallop and no friction rub.   No murmur heard.  Rate 82 with a regular   Pulmonary/Chest: Breath sounds normal.   Abdominal: Soft. He exhibits no distension and no mass. There is no tenderness.   Musculoskeletal: Normal range of motion.   No peripheral edema 2+ pulses uses a walker very well   Neurological: He is alert and oriented to person, place, and time. He has normal reflexes.   Skin: Skin is warm and dry.   Psychiatric: His affect is blunt. His speech is delayed. He is slowed.   Mild chronic slow cognition         Visit Vitals  /60 (BP Location: Left arm, Patient Position: Sitting, Cuff Size: Large Adult)   Ht 172.7 cm (67.99\")   Wt 82.2 kg (181 lb 3.2 oz)   BMI 27.56 kg/m²     Body mass index is 27.56 kg/m².      Assessment/Plan   Xander was seen today for follow-up.    Diagnoses and all orders for this visit:    Essential hypertension    Mixed simple and mucopurulent chronic bronchitis (CMS/HCC)    Chronic renal insufficiency, stage 2 (mild)    Seizure disorder (CMS/HCC)    Iron deficiency anemia due to chronic blood loss  -     Hemoglobin & Hematocrit, Blood  -     Iron  -     Vitamin B12  -     Reticulocytes  -     Ambulatory Referral to General Surgery    Standard diagnosis stable unfortunately anemia is new and needs to be worked up.  Has been on iron in the past but unable to tolerate.  Start off with looking at serum iron repeat blood count etc. will need GI evaluation orders for same we will follow-up in 2 to 3 weeks if gone require hematology oncology evaluation for possible iron replacement based on studies.  "

## 2019-11-05 ENCOUNTER — HOSPITAL ENCOUNTER (INPATIENT)
Facility: HOSPITAL | Age: 79
LOS: 2 days | Discharge: HOME | End: 2019-11-07
Attending: INTERNAL MEDICINE | Admitting: INTERNAL MEDICINE

## 2019-11-05 DIAGNOSIS — D64.9 SYMPTOMATIC ANEMIA: Primary | ICD-10-CM

## 2019-11-05 DIAGNOSIS — Z78.9 IMPAIRED MOBILITY AND ADLS: ICD-10-CM

## 2019-11-05 DIAGNOSIS — Z74.09 IMPAIRED MOBILITY AND ADLS: ICD-10-CM

## 2019-11-05 LAB
ABO GROUP BLD: NORMAL
ALBUMIN SERPL-MCNC: 3.8 G/DL (ref 3.5–5.2)
ALBUMIN/GLOB SERPL: 1.6 G/DL
ALP SERPL-CCNC: 82 U/L (ref 39–117)
ALT SERPL W P-5'-P-CCNC: 13 U/L (ref 1–41)
ANION GAP SERPL CALCULATED.3IONS-SCNC: 9 MMOL/L (ref 5–15)
AST SERPL-CCNC: 16 U/L (ref 1–40)
BILIRUB SERPL-MCNC: <0.2 MG/DL (ref 0.2–1.2)
BLD GP AB SCN SERPL QL: NEGATIVE
BUN BLD-MCNC: 19 MG/DL (ref 8–23)
BUN/CREAT SERPL: 13.7 (ref 7–25)
CALCIUM SPEC-SCNC: 8.5 MG/DL (ref 8.6–10.5)
CHLORIDE SERPL-SCNC: 105 MMOL/L (ref 98–107)
CO2 SERPL-SCNC: 25 MMOL/L (ref 22–29)
CREAT BLD-MCNC: 1.39 MG/DL (ref 0.76–1.27)
DEPRECATED RDW RBC AUTO: 41.6 FL (ref 37–54)
ERYTHROCYTE [DISTWIDTH] IN BLOOD BY AUTOMATED COUNT: 13.2 % (ref 12.3–15.4)
FOLATE SERPL-MCNC: >20 NG/ML (ref 4.78–24.2)
GFR SERPL CREATININE-BSD FRML MDRD: 49 ML/MIN/1.73
GLOBULIN UR ELPH-MCNC: 2.4 GM/DL
GLUCOSE BLD-MCNC: 97 MG/DL (ref 65–99)
HCT VFR BLD AUTO: 19.5 % (ref 37.5–51)
HGB BLD-MCNC: 6.2 G/DL (ref 13–17.7)
INR PPP: 1.03 (ref 0.8–1.2)
IRON 24H UR-MRATE: 16 MCG/DL (ref 59–158)
IRON SATN MFR SERPL: 4 % (ref 20–50)
Lab: NORMAL
MAGNESIUM SERPL-MCNC: 2.1 MG/DL (ref 1.6–2.4)
MCH RBC QN AUTO: 27.3 PG (ref 26.6–33)
MCHC RBC AUTO-ENTMCNC: 31.8 G/DL (ref 31.5–35.7)
MCV RBC AUTO: 85.9 FL (ref 79–97)
PHOSPHATE SERPL-MCNC: 2.7 MG/DL (ref 2.5–4.5)
PLATELET # BLD AUTO: 188 10*3/MM3 (ref 140–450)
PMV BLD AUTO: 9.3 FL (ref 6–12)
POTASSIUM BLD-SCNC: 4.3 MMOL/L (ref 3.5–5.2)
PROT SERPL-MCNC: 6.2 G/DL (ref 6–8.5)
PROTHROMBIN TIME: 13.3 SECONDS (ref 11.1–15.3)
RBC # BLD AUTO: 2.27 10*6/MM3 (ref 4.14–5.8)
RH BLD: POSITIVE
SODIUM BLD-SCNC: 139 MMOL/L (ref 136–145)
T&S EXPIRATION DATE: NORMAL
T4 FREE SERPL-MCNC: 1 NG/DL (ref 0.93–1.7)
TIBC SERPL-MCNC: 402 MCG/DL (ref 298–536)
TRANSFERRIN SERPL-MCNC: 270 MG/DL (ref 200–360)
TSH SERPL DL<=0.05 MIU/L-ACNC: 1.61 UIU/ML (ref 0.27–4.2)
VIT B12 BLD-MCNC: 530 PG/ML (ref 211–946)
WBC NRBC COR # BLD: 4.78 10*3/MM3 (ref 3.4–10.8)

## 2019-11-05 PROCEDURE — 25010000002 DIPHENHYDRAMINE PER 50 MG: Performed by: INTERNAL MEDICINE

## 2019-11-05 PROCEDURE — 86901 BLOOD TYPING SEROLOGIC RH(D): CPT | Performed by: INTERNAL MEDICINE

## 2019-11-05 PROCEDURE — 86850 RBC ANTIBODY SCREEN: CPT

## 2019-11-05 PROCEDURE — 25010000002 METHYLPREDNISOLONE PER 125 MG: Performed by: INTERNAL MEDICINE

## 2019-11-05 PROCEDURE — 86880 COOMBS TEST DIRECT: CPT

## 2019-11-05 PROCEDURE — P9016 RBC LEUKOCYTES REDUCED: HCPCS

## 2019-11-05 PROCEDURE — 86901 BLOOD TYPING SEROLOGIC RH(D): CPT

## 2019-11-05 PROCEDURE — 83540 ASSAY OF IRON: CPT | Performed by: INTERNAL MEDICINE

## 2019-11-05 PROCEDURE — 86923 COMPATIBILITY TEST ELECTRIC: CPT

## 2019-11-05 PROCEDURE — 84443 ASSAY THYROID STIM HORMONE: CPT | Performed by: INTERNAL MEDICINE

## 2019-11-05 PROCEDURE — 86850 RBC ANTIBODY SCREEN: CPT | Performed by: INTERNAL MEDICINE

## 2019-11-05 PROCEDURE — 63710000001 PREDNISONE PER 1 MG: Performed by: INTERNAL MEDICINE

## 2019-11-05 PROCEDURE — 84439 ASSAY OF FREE THYROXINE: CPT | Performed by: INTERNAL MEDICINE

## 2019-11-05 PROCEDURE — 84466 ASSAY OF TRANSFERRIN: CPT | Performed by: INTERNAL MEDICINE

## 2019-11-05 PROCEDURE — 84100 ASSAY OF PHOSPHORUS: CPT | Performed by: INTERNAL MEDICINE

## 2019-11-05 PROCEDURE — 94799 UNLISTED PULMONARY SVC/PX: CPT

## 2019-11-05 PROCEDURE — 80053 COMPREHEN METABOLIC PANEL: CPT | Performed by: INTERNAL MEDICINE

## 2019-11-05 PROCEDURE — 86900 BLOOD TYPING SEROLOGIC ABO: CPT | Performed by: INTERNAL MEDICINE

## 2019-11-05 PROCEDURE — 94760 N-INVAS EAR/PLS OXIMETRY 1: CPT

## 2019-11-05 PROCEDURE — 94640 AIRWAY INHALATION TREATMENT: CPT

## 2019-11-05 PROCEDURE — 86900 BLOOD TYPING SEROLOGIC ABO: CPT

## 2019-11-05 PROCEDURE — 25010000002 NA FERRIC GLUC CPLX PER 12.5 MG: Performed by: INTERNAL MEDICINE

## 2019-11-05 PROCEDURE — 85027 COMPLETE CBC AUTOMATED: CPT | Performed by: INTERNAL MEDICINE

## 2019-11-05 PROCEDURE — 99232 SBSQ HOSP IP/OBS MODERATE 35: CPT | Performed by: INTERNAL MEDICINE

## 2019-11-05 PROCEDURE — 36430 TRANSFUSION BLD/BLD COMPNT: CPT

## 2019-11-05 PROCEDURE — 85610 PROTHROMBIN TIME: CPT | Performed by: INTERNAL MEDICINE

## 2019-11-05 PROCEDURE — 83735 ASSAY OF MAGNESIUM: CPT | Performed by: INTERNAL MEDICINE

## 2019-11-05 PROCEDURE — 82746 ASSAY OF FOLIC ACID SERUM: CPT | Performed by: INTERNAL MEDICINE

## 2019-11-05 PROCEDURE — 82607 VITAMIN B-12: CPT | Performed by: INTERNAL MEDICINE

## 2019-11-05 RX ORDER — ONDANSETRON 2 MG/ML
4 INJECTION INTRAMUSCULAR; INTRAVENOUS EVERY 6 HOURS PRN
Status: DISCONTINUED | OUTPATIENT
Start: 2019-11-05 | End: 2019-11-07 | Stop reason: HOSPADM

## 2019-11-05 RX ORDER — DILTIAZEM HYDROCHLORIDE 180 MG/1
180 CAPSULE, COATED, EXTENDED RELEASE ORAL DAILY
Status: DISCONTINUED | OUTPATIENT
Start: 2019-11-06 | End: 2019-11-07 | Stop reason: HOSPADM

## 2019-11-05 RX ORDER — ACETAMINOPHEN 325 MG/1
650 TABLET ORAL EVERY 8 HOURS PRN
Status: DISCONTINUED | OUTPATIENT
Start: 2019-11-05 | End: 2019-11-07 | Stop reason: HOSPADM

## 2019-11-05 RX ORDER — METHYLPREDNISOLONE SODIUM SUCCINATE 125 MG/2ML
125 INJECTION, POWDER, LYOPHILIZED, FOR SOLUTION INTRAMUSCULAR; INTRAVENOUS ONCE
Status: COMPLETED | OUTPATIENT
Start: 2019-11-05 | End: 2019-11-05

## 2019-11-05 RX ORDER — PREDNISONE 20 MG/1
40 TABLET ORAL
Status: DISCONTINUED | OUTPATIENT
Start: 2019-11-05 | End: 2019-11-07 | Stop reason: HOSPADM

## 2019-11-05 RX ORDER — DEXTROSE AND SODIUM CHLORIDE 5; .45 G/100ML; G/100ML
30 INJECTION, SOLUTION INTRAVENOUS CONTINUOUS PRN
Status: DISCONTINUED | OUTPATIENT
Start: 2019-11-05 | End: 2019-11-07 | Stop reason: HOSPADM

## 2019-11-05 RX ORDER — SODIUM CHLORIDE 0.9 % (FLUSH) 0.9 %
10 SYRINGE (ML) INJECTION AS NEEDED
Status: DISCONTINUED | OUTPATIENT
Start: 2019-11-05 | End: 2019-11-07 | Stop reason: HOSPADM

## 2019-11-05 RX ORDER — TAMSULOSIN HYDROCHLORIDE 0.4 MG/1
0.4 CAPSULE ORAL NIGHTLY
Status: DISCONTINUED | OUTPATIENT
Start: 2019-11-05 | End: 2019-11-07 | Stop reason: HOSPADM

## 2019-11-05 RX ORDER — LISINOPRIL 20 MG/1
20 TABLET ORAL DAILY
Status: DISCONTINUED | OUTPATIENT
Start: 2019-11-06 | End: 2019-11-07 | Stop reason: HOSPADM

## 2019-11-05 RX ORDER — LAMOTRIGINE 100 MG/1
200 TABLET ORAL DAILY
Status: DISCONTINUED | OUTPATIENT
Start: 2019-11-06 | End: 2019-11-07 | Stop reason: HOSPADM

## 2019-11-05 RX ORDER — IPRATROPIUM BROMIDE AND ALBUTEROL SULFATE 2.5; .5 MG/3ML; MG/3ML
3 SOLUTION RESPIRATORY (INHALATION)
Status: DISCONTINUED | OUTPATIENT
Start: 2019-11-05 | End: 2019-11-06

## 2019-11-05 RX ORDER — CLOPIDOGREL BISULFATE 75 MG/1
75 TABLET ORAL DAILY
Status: DISCONTINUED | OUTPATIENT
Start: 2019-11-06 | End: 2019-11-07 | Stop reason: HOSPADM

## 2019-11-05 RX ORDER — BUDESONIDE AND FORMOTEROL FUMARATE DIHYDRATE 160; 4.5 UG/1; UG/1
2 AEROSOL RESPIRATORY (INHALATION)
Status: DISCONTINUED | OUTPATIENT
Start: 2019-11-05 | End: 2019-11-07 | Stop reason: HOSPADM

## 2019-11-05 RX ORDER — SODIUM CHLORIDE 9 MG/ML
125 INJECTION, SOLUTION INTRAVENOUS CONTINUOUS
Status: DISCONTINUED | OUTPATIENT
Start: 2019-11-05 | End: 2019-11-06

## 2019-11-05 RX ORDER — SODIUM CHLORIDE 0.9 % (FLUSH) 0.9 %
10 SYRINGE (ML) INJECTION EVERY 12 HOURS SCHEDULED
Status: DISCONTINUED | OUTPATIENT
Start: 2019-11-05 | End: 2019-11-07 | Stop reason: HOSPADM

## 2019-11-05 RX ORDER — CALCIUM CARBONATE 200(500)MG
1 TABLET,CHEWABLE ORAL 2 TIMES DAILY PRN
Status: DISCONTINUED | OUTPATIENT
Start: 2019-11-05 | End: 2019-11-07 | Stop reason: HOSPADM

## 2019-11-05 RX ORDER — HEPARIN SODIUM 5000 [USP'U]/ML
5000 INJECTION, SOLUTION INTRAVENOUS; SUBCUTANEOUS EVERY 8 HOURS SCHEDULED
Status: DISCONTINUED | OUTPATIENT
Start: 2019-11-05 | End: 2019-11-05

## 2019-11-05 RX ORDER — ROSUVASTATIN CALCIUM 20 MG/1
20 TABLET, COATED ORAL DAILY
Status: DISCONTINUED | OUTPATIENT
Start: 2019-11-06 | End: 2019-11-07 | Stop reason: HOSPADM

## 2019-11-05 RX ORDER — GABAPENTIN 300 MG/1
600 CAPSULE ORAL EVERY 8 HOURS SCHEDULED
Status: DISCONTINUED | OUTPATIENT
Start: 2019-11-05 | End: 2019-11-07 | Stop reason: HOSPADM

## 2019-11-05 RX ORDER — DIPHENHYDRAMINE HYDROCHLORIDE 50 MG/ML
50 INJECTION INTRAMUSCULAR; INTRAVENOUS ONCE
Status: COMPLETED | OUTPATIENT
Start: 2019-11-05 | End: 2019-11-05

## 2019-11-05 RX ADMIN — TAMSULOSIN HYDROCHLORIDE 0.4 MG: 0.4 CAPSULE ORAL at 23:38

## 2019-11-05 RX ADMIN — IPRATROPIUM BROMIDE AND ALBUTEROL SULFATE 3 ML: 2.5; .5 SOLUTION RESPIRATORY (INHALATION) at 15:08

## 2019-11-05 RX ADMIN — DIPHENHYDRAMINE HYDROCHLORIDE 50 MG: 50 INJECTION INTRAMUSCULAR; INTRAVENOUS at 19:28

## 2019-11-05 RX ADMIN — POLYETHYLENE GLYCOL 3350, SODIUM SULFATE ANHYDROUS, SODIUM BICARBONATE, SODIUM CHLORIDE, POTASSIUM CHLORIDE 4000 ML: 236; 22.74; 6.74; 5.86; 2.97 POWDER, FOR SOLUTION ORAL at 18:41

## 2019-11-05 RX ADMIN — BUDESONIDE AND FORMOTEROL FUMARATE DIHYDRATE 2 PUFF: 160; 4.5 AEROSOL RESPIRATORY (INHALATION) at 20:14

## 2019-11-05 RX ADMIN — METHYLPREDNISOLONE SODIUM SUCCINATE 125 MG: 125 INJECTION, POWDER, FOR SOLUTION INTRAMUSCULAR; INTRAVENOUS at 19:27

## 2019-11-05 RX ADMIN — PREDNISONE 40 MG: 20 TABLET ORAL at 18:42

## 2019-11-05 RX ADMIN — ACETAMINOPHEN 650 MG: 325 TABLET, FILM COATED ORAL at 19:28

## 2019-11-05 RX ADMIN — IPRATROPIUM BROMIDE AND ALBUTEROL SULFATE 3 ML: 2.5; .5 SOLUTION RESPIRATORY (INHALATION) at 20:14

## 2019-11-05 RX ADMIN — GABAPENTIN 600 MG: 300 CAPSULE ORAL at 21:44

## 2019-11-05 RX ADMIN — SODIUM CHLORIDE 125 MG: 9 INJECTION, SOLUTION INTRAVENOUS at 16:00

## 2019-11-05 NOTE — H&P
[facility] Medicine Admission      Date of Admission: 11/5/2019      Primary Care Physician: Deni Garcia MD      Chief Complaint: fatigue and SOB    HPI:  79 yea old male patient who came to the hospital for direct admission after recent labs reported hemoglobin/hematocrit of 6/19. This blood work was ordered as patient complained of 2 week of slowly progressive fatigue and SOB. He also refers episode of blood before each bowel movement. Family refers some weight loss and loss of appetite. He denies melena, hematemesis.           Concurrent Medical History:   Past Medical History:   Diagnosis Date   • Aortic stenosis    • Asthma    • COPD (chronic obstructive pulmonary disease) (CMS/[facility])    • Coronary artery disease involving coronary bypass graft     CABG 2012, stent 2016, stent 2005   • Emphysema of lung (CMS/[facility])    • Hyperlipidemia    • Hypertension    • PVD (peripheral vascular disease) (CMS/[facility])    • Seizure disorder (CMS/[facility])    • Skin cancer of lip    • Sleep apnea          Past Surgical History:   Past Surgical History:   Procedure Laterality Date   • APPENDECTOMY     • CARDIAC SURGERY      CABG 2012   • CAROTID STENT     • COLON SURGERY     • CORONARY ARTERY BYPASS GRAFT     • HEMORRHOIDECTOMY     • HERNIA REPAIR         Family History: family history includes Cancer in his father.     Social History:  reports that he has quit smoking. He has never used smokeless tobacco. He reports that he does not drink alcohol or use drugs.    Allergies:   Allergies   Allergen Reactions   • Doxycycline    • Amoxicillin    • Ceftin [Cefuroxime]    • Methylphenidate Derivatives    • Nystatin    • Sulfur    • Theophyllines Other (See Comments)     Not specified          Medications:   No current facility-administered medications on file prior to encounter.      Current Outpatient Medications on File Prior to Encounter   Medication Sig Dispense Refill   • albuterol (PROVENTIL  HFA;VENTOLIN HFA) 108 (90 Base) MCG/ACT inhaler Inhale 2 puffs Every 4 (Four) Hours As Needed for Wheezing. 3 inhaler 3   • B Complex-Biotin-FA (SUPER B-COMPLEX) tablet Take  by mouth.     • budesonide-formoterol (SYMBICORT) 160-4.5 MCG/ACT inhaler Inhale 2 puffs 2 (Two) Times a Day.     • clopidogrel (PLAVIX) 75 MG tablet Take 1 tablet by mouth Daily. 90 tablet 3   • diltiaZEM CD (CARDIZEM CD) 180 MG 24 hr capsule Take 1 capsule by mouth Daily. 90 capsule 3   • gabapentin (NEURONTIN) 600 MG tablet Take 600 mg by mouth 3 (three) times a day.     • lamoTRIgine (LaMICtal) 200 MG tablet Take 200 mg by mouth daily.     • lisinopril (PRINIVIL,ZESTRIL) 20 MG tablet Take 1 tablet by mouth Daily. 90 tablet 3   • mupirocin (BACTROBAN) 2 % cream Apply  topically to the appropriate area as directed 3 (Three) Times a Day. 30 g 1   • nitroglycerin (NITROSTAT) 0.4 MG SL tablet 1 under the tongue as needed for angina, may repeat q5mins for up three doses 25 tablet 12   • Omega-3 Fatty Acids (FISH OIL) 1000 MG capsule capsule Take  by mouth daily with breakfast.     • rosuvastatin (CRESTOR) 20 MG tablet Take 1 tablet by mouth Daily. 90 tablet 3   • tamsulosin (FLOMAX) 0.4 MG capsule 24 hr capsule Take 1 capsule by mouth Every Night. 90 capsule 3   • triamcinolone (KENALOG) 0.5 % cream            Review of Systems:  Review of Systems all 12 point were reviewed and they were negative, except for fatigue and SOB       Physical Exam:   Temp:  [97.2 °F (36.2 °C)] 97.2 °F (36.2 °C)  Heart Rate:  [71] 71  Resp:  [18] 18  BP: (158)/(70) 158/70  Physical Exam  GENERAL APPEARANCE: Well developed, well nourished, alert and cooperative, , not acute distress.  HEENT: normocephalic. PERRL, EOMI, vision is grossly intact.: External auditory canals and tympanic membranes clear, hearing grossly intact. No nasal discharge. Oral cavity and pharynx normal. No inflammation, swelling, exudate, or lesions. Teeth and gingiva in good general  condition.  NECK: Neck supple, non-tender without lymphadenopathy, masses or thyromegaly.  CARDIAC: Normal S1 and S2. RRR, not M/R/G.   LUNGS: decreased air entry b/l and wheezing   ABDOMEN: Positive bowel sounds. Soft, nondistended, nontender. No guarding or rebound. No masses.  MUSKULOSKELETAL: ROM intact spine and extremities. No joint erythema or tenderness. Normal muscular development. Normal gait.  BACK: Examination of the spine reveals normal gait and posture, no spinal deformity, symmetry of spinal muscles, without tenderness, decreased range of motion or muscular spasm.  EXTREMITIES: No significant deformity or joint abnormality. No edema. Peripheral pulses intact. No varicosities.  NEUROLOGICAL: CN II-XII intact. Strength and sensation symmetric and intact throughout. Reflexes 2+ throughout. Cerebellar testing normal.  SKIN: Skin normal color, texture and turgor with no lesions or eruptions.  PSYCHIATRIC: oriented x 3 .  good judgement and reason, without hallucinations, abnormal affect or abnormal behaviors during the examination.  not suicidal.      Results Reviewed:  I have personally reviewed current lab, radiology, and data and agree with results.  Lab Results (last 24 hours)     ** No results found for the last 24 hours. **        Imaging Results (Last 24 Hours)     ** No results found for the last 24 hours. **            Assessment/plan :    Active Hospital Problems    Diagnosis   • Symptomatic anemia    Symptomatic anemia   Iron deficiency     - likely chronic blood loss anemia from GI source  - will consult GI  - start IV iron replacement   - clear liquid diet   - will recheck labs and transfuse if hemoglobin is < 7   - will monitor hemoglobin daily   - hold on PPI     Addendum   Repeated CBC here revealed hemoglobin of 6.2 , will transfused 2 units of PRBC. I discussed risk and benefits  With patient , he agreed with it.       COPD exacerbation   - with wheezing on exam   - start duoneb and  prednisone PO             I discussed the patient's findings and my recommendations with: patient and wife       Code status and advance care of plan was discussed with patient and want to be full code     Sarina Sandhu MD

## 2019-11-05 NOTE — CONSULTS
SUBJECTIVE:   11/5/2019    Name: Xander Wallace  DOD: 1940    REASON FOR CONSULT: Symptomatic anemia.    Chief Complaint:   No chief complaint on file.      Subjective     Patient is 79 y.o. male with past medical history of aortic stenosis, asthma, COPD, coronary artery disease status post CABG and PTCA, chronic anticoagulation therapy with Plavix presenting for evaluation for symptomatic anemia.  Patient had rectal bleeding in small amounts for the past 3 days.  Denied abdominal pain, nausea, vomiting, diarrhea, constipation, melena or weight loss.  He was noted to have a hemoglobin of 6.3 with baseline hemoglobin being 9.2 presents with worsening fatigue and exertional dyspnea.  He denied cough or chest pain.  Has not been on iron supplements.  Received 2 units of packed red cells today.  His last colonoscopy in 2013 at [facility] was consistent with polyps, diverticulosis and hemorrhoids.     ROS/HISTORY/ CURRENT MEDICATIONS/OBJECTIVE/VS/PE:   Review of Systems:   Review of Systems   Constitutional: Positive for fatigue. Negative for chills, fever and unexpected weight change.   HENT: Negative for congestion, ear discharge, hearing loss, nosebleeds and sore throat.    Eyes: Negative for pain, discharge and redness.   Respiratory: Negative for cough, chest tightness, shortness of breath and wheezing.    Cardiovascular: Negative for chest pain and palpitations.   Gastrointestinal: Negative for abdominal distention, abdominal pain, blood in stool, constipation, diarrhea, nausea and vomiting.   Endocrine: Negative for cold intolerance, polydipsia, polyphagia and polyuria.   Genitourinary: Negative for dysuria, flank pain, frequency, hematuria and urgency.   Musculoskeletal: Negative for arthralgias, back pain, joint swelling and myalgias.   Skin: Negative for color change, pallor and rash.   Neurological: Negative for tremors, seizures, syncope, weakness and headaches.   Hematological: Negative for  adenopathy. Does not bruise/bleed easily.   Psychiatric/Behavioral: Negative for behavioral problems, confusion, dysphoric mood, hallucinations and suicidal ideas. The patient is not nervous/anxious.        History:     Past Medical History:   Diagnosis Date   • Aortic stenosis    • Asthma    • COPD (chronic obstructive pulmonary disease) (CMS/[facility])    • Coronary artery disease involving coronary bypass graft     CABG 2012, stent 2016, stent 2005   • Emphysema of lung (CMS/[facility])    • Hyperlipidemia    • Hypertension    • PVD (peripheral vascular disease) (CMS/[facility])    • Seizure disorder (CMS/[facility])    • Skin cancer of lip    • Sleep apnea      Past Surgical History:   Procedure Laterality Date   • APPENDECTOMY     • CARDIAC SURGERY      CABG 2012   • CAROTID STENT     • COLON SURGERY     • CORONARY ARTERY BYPASS GRAFT     • HEMORRHOIDECTOMY     • HERNIA REPAIR       Family History   Problem Relation Age of Onset   • Cancer Father      Social History     Tobacco Use   • Smoking status: Former Smoker   • Smokeless tobacco: Never Used   Substance Use Topics   • Alcohol use: No   • Drug use: No     Medications Prior to Admission   Medication Sig Dispense Refill Last Dose   • albuterol (PROVENTIL HFA;VENTOLIN HFA) 108 (90 Base) MCG/ACT inhaler Inhale 2 puffs Every 4 (Four) Hours As Needed for Wheezing. 3 inhaler 3 Past Week at Unknown time   • B Complex-Biotin-FA (SUPER B-COMPLEX) tablet Take  by mouth.   11/5/2019 at Unknown time   • budesonide-formoterol (SYMBICORT) 160-4.5 MCG/ACT inhaler Inhale 2 puffs 2 (Two) Times a Day.   11/5/2019 at Unknown time   • clopidogrel (PLAVIX) 75 MG tablet Take 1 tablet by mouth Daily. 90 tablet 3 11/5/2019 at Unknown time   • diltiaZEM CD (CARDIZEM CD) 180 MG 24 hr capsule Take 1 capsule by mouth Daily. 90 capsule 3 11/5/2019 at Unknown time   • gabapentin (NEURONTIN) 600 MG tablet Take 600 mg by mouth 3 (three) times a day.   11/5/2019 at Unknown time   • lamoTRIgine (LaMICtal) 200 MG  tablet Take 200 mg by mouth daily.   11/5/2019 at Unknown time   • lisinopril (PRINIVIL,ZESTRIL) 20 MG tablet Take 1 tablet by mouth Daily. 90 tablet 3 11/5/2019 at Unknown time   • mupirocin (BACTROBAN) 2 % cream Apply  topically to the appropriate area as directed 3 (Three) Times a Day. 30 g 1 11/5/2019 at Unknown time   • nitroglycerin (NITROSTAT) 0.4 MG SL tablet 1 under the tongue as needed for angina, may repeat q5mins for up three doses 25 tablet 12 Past Week at Unknown time   • Omega-3 Fatty Acids (FISH OIL) 1000 MG capsule capsule Take  by mouth daily with breakfast.   11/5/2019 at Unknown time   • rosuvastatin (CRESTOR) 20 MG tablet Take 1 tablet by mouth Daily. 90 tablet 3 11/5/2019 at Unknown time   • tamsulosin (FLOMAX) 0.4 MG capsule 24 hr capsule Take 1 capsule by mouth Every Night. 90 capsule 3 11/4/2019 at Unknown time   • triamcinolone (KENALOG) 0.5 % cream    11/4/2019 at Unknown time     Allergies:  Doxycycline; Amoxicillin; Ceftin [cefuroxime]; Methylphenidate derivatives; Nystatin; Sulfur; and Theophyllines    I have reviewed the patient's medical history, surgical history and family history in the available medical record system.     Current Medications:     Current Facility-Administered Medications   Medication Dose Route Frequency Provider Last Rate Last Dose   • budesonide-formoterol (SYMBICORT) 160-4.5 MCG/ACT inhaler 2 puff  2 puff Inhalation BID - RT Sarina Sandhu MD       • calcium carbonate (TUMS) chewable tablet 500 mg (200 mg elemental)  1 tablet Oral BID PRN Sarina Sandhu MD       • [START ON 11/6/2019] clopidogrel (PLAVIX) tablet 75 mg  75 mg Oral Daily Sarina Sandhu MD       • [START ON 11/6/2019] dilTIAZem CD (CARDIZEM CD) 24 hr capsule 180 mg  180 mg Oral Daily Sarina Sandhu MD       • ferric gluconate (FERRLECIT) 125 mg in sodium chloride 0.9 % 110 mL IVPB  125 mg Intravenous Daily Sarina Sandhu  mL/hr at 11/05/19  1600 125 mg at 11/05/19 1600   • gabapentin (NEURONTIN) capsule 600 mg  600 mg Oral Q8H Sarina Sandhu MD       • ipratropium-albuterol (DUO-NEB) nebulizer solution 3 mL  3 mL Nebulization Q6H - RT Sarina Sandhu MD   3 mL at 11/05/19 1508   • [START ON 11/6/2019] lamoTRIgine (LaMICtal) tablet 200 mg  200 mg Oral Daily Sarina Sandhu MD       • [START ON 11/6/2019] lisinopril (PRINIVIL,ZESTRIL) tablet 20 mg  20 mg Oral Daily Sarina Sandhu MD       • ondansetron (ZOFRAN) injection 4 mg  4 mg Intravenous Q6H PRN Sarina Sandhu MD       • predniSONE (DELTASONE) tablet 40 mg  40 mg Oral Daily With Breakfast Sarina Sandhu MD       • [START ON 11/6/2019] rosuvastatin (CRESTOR) tablet 20 mg  20 mg Oral Daily Sarina Sandhu MD       • sodium chloride 0.9 % flush 10 mL  10 mL Intravenous Q12H Sarina Sandhu MD       • sodium chloride 0.9 % flush 10 mL  10 mL Intravenous PRN Sarina Sandhu MD       • tamsulosin (FLOMAX) 24 hr capsule 0.4 mg  0.4 mg Oral Nightly Sarina Sandhu MD           Objective     Physical Exam:   Temp:  [97.2 °F (36.2 °C)-98 °F (36.7 °C)] 98 °F (36.7 °C)  Heart Rate:  [68-71] 70  Resp:  [18] 18  BP: (156-158)/(70-72) 156/72    Physical Exam:  General Appearance:    Alert, cooperative, in no acute distress   Head:    Normocephalic, without obvious abnormality, atraumatic   Eyes:            Lids and lashes normal, conjunctivae and sclerae normal, no   icterus, no pallor, corneas clear, PERRLA   Ears:    Ears appear intact with no abnormalities noted   Throat:   No oral lesions, no thrush, oral mucosa moist   Neck:   No adenopathy, supple, trachea midline, no thyromegaly, no     carotid bruit, no JVD   Back:     No kyphosis present, no scoliosis present, no skin lesions,       erythema or scars, no tenderness to percussion or                   palpation,   range of motion normal   Lungs:     Clear to  auscultation,respirations regular, even and                   unlabored    Heart:    Regular rhythm and normal rate, normal S1 and S2, no            murmur, no gallop, no rub, no click   Breast Exam:    Deferred   Abdomen:     Normal bowel sounds, no masses, no organomegaly, soft        non-tender, non-distended, no guarding, no rebound                 tenderness   Genitalia:    Deferred   Extremities:   Moves all extremities well, no edema, no cyanosis, no              redness   Pulses:   Pulses palpable and equal bilaterally   Skin:   No bleeding, bruising or rash   Lymph nodes:   No palpable adenopathy   Neurologic:   Cranial nerves 2 - 12 grossly intact, sensation intact, DTR        present and equal bilaterally      Results Review:     Lab Results   Component Value Date    WBC 4.78 11/05/2019    WBC 5.82 06/29/2019    WBC 6.19 07/06/2017    HGB 6.2 (C) 11/05/2019    HGB 6.2 (C) 11/04/2019    HGB 9.2 (L) 06/29/2019    HCT 19.5 (C) 11/05/2019    HCT 19.4 (C) 11/04/2019    HCT 28.3 (L) 06/29/2019     11/05/2019     06/29/2019     07/06/2017     Results from last 7 days   Lab Units 11/05/19  1447   ALK PHOS U/L 82   ALT (SGPT) U/L 13   AST (SGOT) U/L 16     Results from last 7 days   Lab Units 11/05/19  1447   BILIRUBIN mg/dL <0.2*   ALK PHOS U/L 82     No results found for: LIPASE  Lab Results   Component Value Date    INR 1.03 11/05/2019    INR 0.9 08/23/2016    INR 1.0 05/04/2016         Radiology Review:  Imaging Results (Last 72 Hours)     ** No results found for the last 72 hours. **          I reviewed the patient's new clinical results.    I reviewed the patient's new imaging results and agree with the interpretation.     ASSESSMENT/PLAN:   ASSESSMENT: Profound anemia, rectal bleeding with fatigue and shortness of breath need to rule out peptic ulcer disease, gastritis and colorectal neoplasia.  Patient has history of colon polyps.    PLAN: Follow H&H closely and continue PPI.  Schedule  EGD and colonoscopy for a.m.  PillCam if endoscopic evaluation is unremarkable.  Add iron supplements.  The risks, benefits, and alternatives of this procedure have been discussed with the patient or the responsible party. The patient understands and agrees to proceed.         Judith Conti MD  11/05/19  5:33 PM

## 2019-11-06 ENCOUNTER — ANESTHESIA (OUTPATIENT)
Dept: GASTROENTEROLOGY | Facility: HOSPITAL | Age: 79
End: 2019-11-06

## 2019-11-06 ENCOUNTER — ANESTHESIA EVENT (OUTPATIENT)
Dept: GASTROENTEROLOGY | Facility: HOSPITAL | Age: 79
End: 2019-11-06

## 2019-11-06 LAB
ANION GAP SERPL CALCULATED.3IONS-SCNC: 11 MMOL/L (ref 5–15)
BASOPHILS # BLD AUTO: 0 10*3/MM3 (ref 0–0.2)
BASOPHILS NFR BLD AUTO: 0 % (ref 0–1.5)
BUN BLD-MCNC: 15 MG/DL (ref 8–23)
BUN/CREAT SERPL: 12.5 (ref 7–25)
CALCIUM SPEC-SCNC: 8.7 MG/DL (ref 8.6–10.5)
CHLORIDE SERPL-SCNC: 104 MMOL/L (ref 98–107)
CO2 SERPL-SCNC: 24 MMOL/L (ref 22–29)
CREAT BLD-MCNC: 1.2 MG/DL (ref 0.76–1.27)
DEPRECATED RDW RBC AUTO: 42.6 FL (ref 37–54)
EOSINOPHIL # BLD AUTO: 0 10*3/MM3 (ref 0–0.4)
EOSINOPHIL NFR BLD AUTO: 0 % (ref 0.3–6.2)
ERYTHROCYTE [DISTWIDTH] IN BLOOD BY AUTOMATED COUNT: 13.7 % (ref 12.3–15.4)
GFR SERPL CREATININE-BSD FRML MDRD: 58 ML/MIN/1.73
GLUCOSE BLD-MCNC: 145 MG/DL (ref 65–99)
HCT VFR BLD AUTO: 28 % (ref 37.5–51)
HCT VFR BLD AUTO: 29.1 % (ref 37.5–51)
HGB BLD-MCNC: 9.5 G/DL (ref 13–17.7)
HGB BLD-MCNC: 9.6 G/DL (ref 13–17.7)
IMM GRANULOCYTES # BLD AUTO: 0.03 10*3/MM3 (ref 0–0.05)
IMM GRANULOCYTES NFR BLD AUTO: 0.7 % (ref 0–0.5)
LYMPHOCYTES # BLD AUTO: 0.38 10*3/MM3 (ref 0.7–3.1)
LYMPHOCYTES NFR BLD AUTO: 9.1 % (ref 19.6–45.3)
MCH RBC QN AUTO: 28.2 PG (ref 26.6–33)
MCHC RBC AUTO-ENTMCNC: 33 G/DL (ref 31.5–35.7)
MCV RBC AUTO: 85.6 FL (ref 79–97)
MONOCYTES # BLD AUTO: 0.06 10*3/MM3 (ref 0.1–0.9)
MONOCYTES NFR BLD AUTO: 1.4 % (ref 5–12)
NEUTROPHILS # BLD AUTO: 3.71 10*3/MM3 (ref 1.7–7)
NEUTROPHILS NFR BLD AUTO: 88.8 % (ref 42.7–76)
NRBC BLD AUTO-RTO: 0 /100 WBC (ref 0–0.2)
PLATELET # BLD AUTO: 196 10*3/MM3 (ref 140–450)
PMV BLD AUTO: 9.7 FL (ref 6–12)
POTASSIUM BLD-SCNC: 4.1 MMOL/L (ref 3.5–5.2)
RBC # BLD AUTO: 3.4 10*6/MM3 (ref 4.14–5.8)
SODIUM BLD-SCNC: 139 MMOL/L (ref 136–145)
VIT B12 BLD-MCNC: 677 PG/ML (ref 211–946)
WBC NRBC COR # BLD: 4.18 10*3/MM3 (ref 3.4–10.8)

## 2019-11-06 PROCEDURE — 80048 BASIC METABOLIC PNL TOTAL CA: CPT | Performed by: INTERNAL MEDICINE

## 2019-11-06 PROCEDURE — 25010000002 NA FERRIC GLUC CPLX PER 12.5 MG: Performed by: INTERNAL MEDICINE

## 2019-11-06 PROCEDURE — 0DBN8ZX EXCISION OF SIGMOID COLON, VIA NATURAL OR ARTIFICIAL OPENING ENDOSCOPIC, DIAGNOSTIC: ICD-10-PCS | Performed by: INTERNAL MEDICINE

## 2019-11-06 PROCEDURE — 0DB58ZX EXCISION OF ESOPHAGUS, VIA NATURAL OR ARTIFICIAL OPENING ENDOSCOPIC, DIAGNOSTIC: ICD-10-PCS | Performed by: INTERNAL MEDICINE

## 2019-11-06 PROCEDURE — 36430 TRANSFUSION BLD/BLD COMPNT: CPT

## 2019-11-06 PROCEDURE — 85018 HEMOGLOBIN: CPT | Performed by: INTERNAL MEDICINE

## 2019-11-06 PROCEDURE — 88305 TISSUE EXAM BY PATHOLOGIST: CPT | Performed by: PATHOLOGY

## 2019-11-06 PROCEDURE — 85014 HEMATOCRIT: CPT | Performed by: INTERNAL MEDICINE

## 2019-11-06 PROCEDURE — 94799 UNLISTED PULMONARY SVC/PX: CPT

## 2019-11-06 PROCEDURE — 43239 EGD BIOPSY SINGLE/MULTIPLE: CPT | Performed by: INTERNAL MEDICINE

## 2019-11-06 PROCEDURE — 0DB68ZX EXCISION OF STOMACH, VIA NATURAL OR ARTIFICIAL OPENING ENDOSCOPIC, DIAGNOSTIC: ICD-10-PCS | Performed by: INTERNAL MEDICINE

## 2019-11-06 PROCEDURE — 86900 BLOOD TYPING SEROLOGIC ABO: CPT

## 2019-11-06 PROCEDURE — 25010000002 PROPOFOL 10 MG/ML EMULSION: Performed by: NURSE ANESTHETIST, CERTIFIED REGISTERED

## 2019-11-06 PROCEDURE — 25010000002 FUROSEMIDE PER 20 MG: Performed by: INTERNAL MEDICINE

## 2019-11-06 PROCEDURE — 93010 ELECTROCARDIOGRAM REPORT: CPT | Performed by: INTERNAL MEDICINE

## 2019-11-06 PROCEDURE — 0DB98ZX EXCISION OF DUODENUM, VIA NATURAL OR ARTIFICIAL OPENING ENDOSCOPIC, DIAGNOSTIC: ICD-10-PCS | Performed by: INTERNAL MEDICINE

## 2019-11-06 PROCEDURE — 82607 VITAMIN B-12: CPT | Performed by: INTERNAL MEDICINE

## 2019-11-06 PROCEDURE — 88305 TISSUE EXAM BY PATHOLOGIST: CPT | Performed by: INTERNAL MEDICINE

## 2019-11-06 PROCEDURE — 93005 ELECTROCARDIOGRAM TRACING: CPT | Performed by: INTERNAL MEDICINE

## 2019-11-06 PROCEDURE — 0DBK8ZX EXCISION OF ASCENDING COLON, VIA NATURAL OR ARTIFICIAL OPENING ENDOSCOPIC, DIAGNOSTIC: ICD-10-PCS | Performed by: INTERNAL MEDICINE

## 2019-11-06 PROCEDURE — 94760 N-INVAS EAR/PLS OXIMETRY 1: CPT

## 2019-11-06 PROCEDURE — 45380 COLONOSCOPY AND BIOPSY: CPT | Performed by: INTERNAL MEDICINE

## 2019-11-06 PROCEDURE — 45385 COLONOSCOPY W/LESION REMOVAL: CPT | Performed by: INTERNAL MEDICINE

## 2019-11-06 PROCEDURE — 85025 COMPLETE CBC W/AUTO DIFF WBC: CPT | Performed by: INTERNAL MEDICINE

## 2019-11-06 PROCEDURE — P9016 RBC LEUKOCYTES REDUCED: HCPCS

## 2019-11-06 PROCEDURE — 0DBL8ZX EXCISION OF TRANSVERSE COLON, VIA NATURAL OR ARTIFICIAL OPENING ENDOSCOPIC, DIAGNOSTIC: ICD-10-PCS | Performed by: INTERNAL MEDICINE

## 2019-11-06 RX ORDER — IPRATROPIUM BROMIDE AND ALBUTEROL SULFATE 2.5; .5 MG/3ML; MG/3ML
3 SOLUTION RESPIRATORY (INHALATION) EVERY 4 HOURS PRN
Status: DISCONTINUED | OUTPATIENT
Start: 2019-11-06 | End: 2019-11-07 | Stop reason: HOSPADM

## 2019-11-06 RX ORDER — SODIUM CHLORIDE 9 MG/ML
50 INJECTION, SOLUTION INTRAVENOUS CONTINUOUS
Status: DISCONTINUED | OUTPATIENT
Start: 2019-11-06 | End: 2019-11-06

## 2019-11-06 RX ORDER — PANTOPRAZOLE SODIUM 40 MG/1
40 TABLET, DELAYED RELEASE ORAL
Status: DISCONTINUED | OUTPATIENT
Start: 2019-11-06 | End: 2019-11-07 | Stop reason: HOSPADM

## 2019-11-06 RX ORDER — PROPOFOL 10 MG/ML
VIAL (ML) INTRAVENOUS AS NEEDED
Status: DISCONTINUED | OUTPATIENT
Start: 2019-11-06 | End: 2019-11-06 | Stop reason: SURG

## 2019-11-06 RX ORDER — FUROSEMIDE 10 MG/ML
20 INJECTION INTRAMUSCULAR; INTRAVENOUS ONCE
Status: COMPLETED | OUTPATIENT
Start: 2019-11-06 | End: 2019-11-06

## 2019-11-06 RX ORDER — LIDOCAINE HYDROCHLORIDE 20 MG/ML
INJECTION, SOLUTION EPIDURAL; INFILTRATION; INTRACAUDAL; PERINEURAL AS NEEDED
Status: DISCONTINUED | OUTPATIENT
Start: 2019-11-06 | End: 2019-11-06 | Stop reason: SURG

## 2019-11-06 RX ADMIN — GABAPENTIN 600 MG: 300 CAPSULE ORAL at 21:42

## 2019-11-06 RX ADMIN — LISINOPRIL 20 MG: 20 TABLET ORAL at 09:01

## 2019-11-06 RX ADMIN — TAMSULOSIN HYDROCHLORIDE 0.4 MG: 0.4 CAPSULE ORAL at 20:31

## 2019-11-06 RX ADMIN — LIDOCAINE HYDROCHLORIDE 60 MG: 20 INJECTION, SOLUTION EPIDURAL; INFILTRATION; INTRACAUDAL; PERINEURAL at 10:57

## 2019-11-06 RX ADMIN — PROPOFOL 40 MG: 10 INJECTION, EMULSION INTRAVENOUS at 11:06

## 2019-11-06 RX ADMIN — BUDESONIDE AND FORMOTEROL FUMARATE DIHYDRATE 2 PUFF: 160; 4.5 AEROSOL RESPIRATORY (INHALATION) at 22:04

## 2019-11-06 RX ADMIN — IPRATROPIUM BROMIDE AND ALBUTEROL SULFATE 3 ML: 2.5; .5 SOLUTION RESPIRATORY (INHALATION) at 01:21

## 2019-11-06 RX ADMIN — SODIUM CHLORIDE: 9 INJECTION, SOLUTION INTRAVENOUS at 10:53

## 2019-11-06 RX ADMIN — SODIUM CHLORIDE 125 ML/HR: 900 INJECTION, SOLUTION INTRAVENOUS at 03:10

## 2019-11-06 RX ADMIN — SODIUM CHLORIDE, PRESERVATIVE FREE 10 ML: 5 INJECTION INTRAVENOUS at 20:31

## 2019-11-06 RX ADMIN — IPRATROPIUM BROMIDE AND ALBUTEROL SULFATE 3 ML: 2.5; .5 SOLUTION RESPIRATORY (INHALATION) at 06:41

## 2019-11-06 RX ADMIN — PROPOFOL 20 MG: 10 INJECTION, EMULSION INTRAVENOUS at 11:01

## 2019-11-06 RX ADMIN — GABAPENTIN 600 MG: 300 CAPSULE ORAL at 14:35

## 2019-11-06 RX ADMIN — PANTOPRAZOLE SODIUM 40 MG: 40 TABLET, DELAYED RELEASE ORAL at 17:12

## 2019-11-06 RX ADMIN — PROPOFOL 70 MG: 10 INJECTION, EMULSION INTRAVENOUS at 10:57

## 2019-11-06 RX ADMIN — SODIUM CHLORIDE 125 MG: 9 INJECTION, SOLUTION INTRAVENOUS at 14:36

## 2019-11-06 RX ADMIN — DILTIAZEM HYDROCHLORIDE 180 MG: 180 CAPSULE, COATED, EXTENDED RELEASE ORAL at 09:01

## 2019-11-06 RX ADMIN — PROPOFOL 20 MG: 10 INJECTION, EMULSION INTRAVENOUS at 10:59

## 2019-11-06 RX ADMIN — PROPOFOL 40 MG: 10 INJECTION, EMULSION INTRAVENOUS at 11:10

## 2019-11-06 RX ADMIN — PROPOFOL 40 MG: 10 INJECTION, EMULSION INTRAVENOUS at 11:03

## 2019-11-06 RX ADMIN — BUDESONIDE AND FORMOTEROL FUMARATE DIHYDRATE 2 PUFF: 160; 4.5 AEROSOL RESPIRATORY (INHALATION) at 06:44

## 2019-11-06 RX ADMIN — FUROSEMIDE 20 MG: 10 INJECTION, SOLUTION INTRAVENOUS at 07:28

## 2019-11-06 NOTE — NURSING NOTE
Pt had complained of a slight tightness in his chest right before the first unit of blood was ending. He stated that his room was very warm and that he wanted the air on, turned air on and pulled pt up. Helped some. retirement during the second unit pt said that he had the slight tightness in his chest, ausculted lung sounds due to pt having a lot of fluids during the night, EKG ordered and Dr. Tyson made aware. Dr Ortiz said to continue infusing unit and keep monitoring.

## 2019-11-06 NOTE — NURSING NOTE
Called Dr. Conti pt's stool still had some color and pieces of stool in it. Ordered 2 tap water enemas. First tap water enema administered.

## 2019-11-06 NOTE — NURSING NOTE
Pt reported to CNA that he was having a huge rash and itching all over his upper back, arms and chest.  This RN presented to the room and found patient to be broken out in hives.  Blood transfusion stopped and saline started.  Vital signs checked and Dr. Nick mayo.  Awaiting new orders at this time.

## 2019-11-06 NOTE — ANESTHESIA PREPROCEDURE EVALUATION
Anesthesia Evaluation     Patient summary reviewed and Nursing notes reviewed   NPO Solid Status: > 8 hours  NPO Liquid Status: > 4 hours           Airway   Mallampati: III  Dental    (+) edentulous    Pulmonary - normal exam   (+) COPD, asthma,sleep apnea,   Cardiovascular - normal exam    (+) hypertension, valvular problems/murmurs murmur, CAD (Coronary artery disease involving coronary bypass graft CABG 2012, stent 2016, stent 2005 ), CABG >6 Months, cardiac stents PVD, hyperlipidemia,       Neuro/Psych  (+) seizures,     GI/Hepatic/Renal/Endo    (+)   renal disease (stage 2) CRI,     Musculoskeletal (-) negative ROS    Abdominal  - normal exam   Substance History      OB/GYN negative ob/gyn ROS         Other      history of cancer                    Anesthesia Plan    ASA 4 - emergent     MAC     intravenous induction     Anesthetic plan, all risks, benefits, and alternatives have been provided, discussed and informed consent has been obtained with: patient.

## 2019-11-06 NOTE — ANESTHESIA POSTPROCEDURE EVALUATION
Patient: Xander Wallace    Procedure Summary     Date:  11/06/19 Room / Location:  [facility] ENDOSCOPY 3 / [facility] ENDOSCOPY    Anesthesia Start:  1053 Anesthesia Stop:  1116    Procedures:       ESOPHAGOGASTRODUODENOSCOPY (N/A )      COLONOSCOPY (N/A ) Diagnosis:       Symptomatic anemia      (Symptomatic anemia [D64.9])    Surgeon:  Judith Conti MD Provider:  Kelsey Mixon CRNA    Anesthesia Type:  MAC ASA Status:  4 - Emergent          Anesthesia Type: MAC  Last vitals  BP   163/55 (11/06/19 1037)   Temp   98.9 °F (37.2 °C) (11/06/19 1035)   Pulse   66 (11/06/19 1035)   Resp   18 (11/06/19 1035)     SpO2   97 % (11/06/19 1035)     Post Anesthesia Care and Evaluation    Patient location during evaluation: bedside  Patient participation: complete - patient participated  Level of consciousness: awake and awake and alert  Pain score: 0  Pain management: satisfactory to patient  Airway patency: patent  Anesthetic complications: No anesthetic complications  PONV Status: none  Cardiovascular status: acceptable and stable  Respiratory status: acceptable, room air and spontaneous ventilation  Hydration status: acceptable

## 2019-11-06 NOTE — PLAN OF CARE
Problem: Patient Care Overview  Goal: Plan of Care Review  Outcome: Ongoing (interventions implemented as appropriate)   11/06/19 1216   Coping/Psychosocial   Plan of Care Reviewed With patient   Plan of Care Review   Progress no change   OTHER   Outcome Summary Patient had EGD and colonoscopy today with some gastritis and esophagitis. Will have capsule study outpatient on November 18. Vitals stable. Will continue to monitor.       Problem: Gastrointestinal Bleeding (Adult)  Goal: Signs and Symptoms of Listed Potential Problems Will be Absent, Minimized or Managed (Gastrointestinal Bleeding)  Outcome: Ongoing (interventions implemented as appropriate)      Problem: Fall Risk (Adult)  Goal: Absence of Fall  Outcome: Ongoing (interventions implemented as appropriate)

## 2019-11-06 NOTE — NURSING NOTE
Lab informed this nurse that they have looked into all the necessary transfusion reaction protocol and that they have agreed that the pt can continue having the rest of the blood transfusions. Called Dr. Ortiz who agreed to continue with the second unit and to add another unit in so the pt will received 2 units tonight. MD was informed of the last time the pt received the stat solu-medrol and benadryl.

## 2019-11-06 NOTE — PROGRESS NOTES
[facility] Medicine Services  INPATIENT PROGRESS NOTE    Length of Stay: 1  Date of Admission: 11/5/2019  Primary Care Physician: Deni Garcia MD    Hospital course :   79 yea old male patient who came to the hospital for direct admission after recent labs reported hemoglobin/hematocrit of 6/19. This blood work was ordered as patient complained of 2 week of slowly progressive fatigue and SOB. He also refers episode of blood before each bowel movement. Family refers some weight loss and loss of appetite. He denies melena, hematemesis. 2 PRBC transfusion were ordered,  chest tightness and possible hives were reported twice , transfusion were held and resumed, finally were completed with not major complications. GI was consulted, plan for EGD/colonoscopy.       Subjective     Patient seen and evaluated, not new complains today           Review of Systems     All pertinent negatives and positives are as above. All other systems have been reviewed and are negative unless otherwise stated.     Objective    Temp:  [96.1 °F (35.6 °C)-98.9 °F (37.2 °C)] 98 °F (36.7 °C)  Heart Rate:  [60-76] 63  Resp:  [16-18] 18  BP: (138-163)/(55-72) 139/63    Physical Exam  Constitutional:  Well-developed and well-nourished.  No respiratory distress.      HENT:  Head: Normocephalic and atraumatic.  Mouth:  Moist mucous membranes.    Eyes:  Conjunctivae and EOM are normal.  Pupils are equal, round, and reactive to light.  No scleral icterus.  Cardiovascular:  Normal rate, regular rhythm and normal heart sounds with no murmur.  Pulmonary/Chest: air entry better, wheezing resolved today   Abdominal:  Soft.  Bowel sounds are normal.  No distension and no tenderness.    Neurological:  Alert and oriented to person, place, and time.  No cranial nerve deficit.  No tongue deviation.  No facial droop.  No slurred speech.   Skin:  Skin is warm and dry.  No rash noted.  No pallor.   Psychiatric:  Normal mood  and affect.  Behavior is normal.  Judgment and thought content normal.         Results Review:  I have reviewed the labs, radiology results, and diagnostic studies.    Laboratory Data:   Results from last 7 days   Lab Units 11/06/19  0853 11/05/19  1447   SODIUM mmol/L 139 139   POTASSIUM mmol/L 4.1 4.3   CHLORIDE mmol/L 104 105   CO2 mmol/L 24.0 25.0   BUN mg/dL 15 19   CREATININE mg/dL 1.20 1.39*   GLUCOSE mg/dL 145* 97   CALCIUM mg/dL 8.7 8.5*   BILIRUBIN mg/dL  --  <0.2*   ALK PHOS U/L  --  82   ALT (SGPT) U/L  --  13   AST (SGOT) U/L  --  16   ANION GAP mmol/L 11.0 9.0     Estimated Creatinine Clearance: 55.2 mL/min (by C-G formula based on SCr of 1.2 mg/dL).  Results from last 7 days   Lab Units 11/05/19  1447   MAGNESIUM mg/dL 2.1   PHOSPHORUS mg/dL 2.7         Results from last 7 days   Lab Units 11/06/19  0853 11/05/19  1447 11/04/19  1051   WBC 10*3/mm3 4.18 4.78  --    HEMOGLOBIN g/dL 9.6* 6.2* 6.2*   HEMATOCRIT % 29.1* 19.5* 19.4*   PLATELETS 10*3/mm3 196 188  --      Results from last 7 days   Lab Units 11/05/19  1447   INR  1.03       Culture Data:   No results found for: BLOODCX  No results found for: URINECX  No results found for: RESPCX  No results found for: WOUNDCX  No results found for: STOOLCX  No components found for: BODYFLD    Radiology Data:   Imaging Results (Last 24 Hours)     ** No results found for the last 24 hours. **          I have reviewed the patient's current medications.     Assessment/Plan       Symptomatic anemia   Iron deficiency      - likely chronic blood loss anemia from GI source  - EGD and colonoscopy with GI today : reported gastritis/esophagitis and colonic polyps   - GI recommended for capsule endoscopy as outpatient on 11/18   - continue  IV iron replacement , ask nurse to monitor closely for reaction   - NPO, will resume diet after he is back from procedures   - hemoglobin up to 9.6 today after 2 units PRBC , if remains stable on tomorrow labs , will discharge home    - will recheck labs and transfuse if hemoglobin is < 7   - will monitor hemoglobin daily   - continue  PPI      Gastritis/esophagitis   - start Protonix BID      COPD exacerbation   - wheezing resolved   - start duoneb and prednisone PO   - may need tapering prednisone on discharge                     Discharge Planning: I expect patient to be discharged to home tomorrow if hemoglobin is stable     Sarina Sandhu MD

## 2019-11-07 VITALS
DIASTOLIC BLOOD PRESSURE: 79 MMHG | RESPIRATION RATE: 18 BRPM | WEIGHT: 179.3 LBS | HEIGHT: 69 IN | TEMPERATURE: 98 F | HEART RATE: 85 BPM | OXYGEN SATURATION: 95 % | BODY MASS INDEX: 26.56 KG/M2 | SYSTOLIC BLOOD PRESSURE: 160 MMHG

## 2019-11-07 LAB
ABO + RH BLD: NORMAL
ANION GAP SERPL CALCULATED.3IONS-SCNC: 12 MMOL/L (ref 5–15)
BASOPHILS # BLD AUTO: 0.01 10*3/MM3 (ref 0–0.2)
BASOPHILS NFR BLD AUTO: 0.1 % (ref 0–1.5)
BH BB BLOOD EXPIRATION DATE: NORMAL
BH BB BLOOD TYPE BARCODE: 5100
BH BB DISPENSE STATUS: NORMAL
BH BB PRODUCT CODE: NORMAL
BH BB UNIT NUMBER: NORMAL
BUN BLD-MCNC: 18 MG/DL (ref 8–23)
BUN/CREAT SERPL: 16.7 (ref 7–25)
CALCIUM SPEC-SCNC: 8.7 MG/DL (ref 8.6–10.5)
CHLORIDE SERPL-SCNC: 106 MMOL/L (ref 98–107)
CO2 SERPL-SCNC: 24 MMOL/L (ref 22–29)
CREAT BLD-MCNC: 1.08 MG/DL (ref 0.76–1.27)
DEPRECATED RDW RBC AUTO: 42.9 FL (ref 37–54)
EOSINOPHIL # BLD AUTO: 0.01 10*3/MM3 (ref 0–0.4)
EOSINOPHIL NFR BLD AUTO: 0.1 % (ref 0.3–6.2)
ERYTHROCYTE [DISTWIDTH] IN BLOOD BY AUTOMATED COUNT: 13.7 % (ref 12.3–15.4)
GFR SERPL CREATININE-BSD FRML MDRD: 66 ML/MIN/1.73
GLUCOSE BLD-MCNC: 93 MG/DL (ref 65–99)
HCT VFR BLD AUTO: 29.2 % (ref 37.5–51)
HGB BLD-MCNC: 9.7 G/DL (ref 13–17.7)
IMM GRANULOCYTES # BLD AUTO: 0.04 10*3/MM3 (ref 0–0.05)
IMM GRANULOCYTES NFR BLD AUTO: 0.4 % (ref 0–0.5)
LYMPHOCYTES # BLD AUTO: 0.9 10*3/MM3 (ref 0.7–3.1)
LYMPHOCYTES NFR BLD AUTO: 8.1 % (ref 19.6–45.3)
MCH RBC QN AUTO: 28.3 PG (ref 26.6–33)
MCHC RBC AUTO-ENTMCNC: 33.2 G/DL (ref 31.5–35.7)
MCV RBC AUTO: 85.1 FL (ref 79–97)
MONOCYTES # BLD AUTO: 1.08 10*3/MM3 (ref 0.1–0.9)
MONOCYTES NFR BLD AUTO: 9.7 % (ref 5–12)
NEUTROPHILS # BLD AUTO: 9.05 10*3/MM3 (ref 1.7–7)
NEUTROPHILS NFR BLD AUTO: 81.6 % (ref 42.7–76)
NRBC BLD AUTO-RTO: 0 /100 WBC (ref 0–0.2)
PLATELET # BLD AUTO: 209 10*3/MM3 (ref 140–450)
PMV BLD AUTO: 10 FL (ref 6–12)
POTASSIUM BLD-SCNC: 3.8 MMOL/L (ref 3.5–5.2)
RBC # BLD AUTO: 3.43 10*6/MM3 (ref 4.14–5.8)
SODIUM BLD-SCNC: 142 MMOL/L (ref 136–145)
TRANSFUSION REACTION INTERPRETATION 1: NORMAL
UNIT  ABO: NORMAL
UNIT  RH: NORMAL
VIT B12 BLD-MCNC: 541 PG/ML (ref 211–946)
WBC NRBC COR # BLD: 11.09 10*3/MM3 (ref 3.4–10.8)

## 2019-11-07 PROCEDURE — 94799 UNLISTED PULMONARY SVC/PX: CPT

## 2019-11-07 PROCEDURE — 63710000001 PREDNISONE PER 1 MG: Performed by: INTERNAL MEDICINE

## 2019-11-07 PROCEDURE — 80048 BASIC METABOLIC PNL TOTAL CA: CPT | Performed by: INTERNAL MEDICINE

## 2019-11-07 PROCEDURE — 82607 VITAMIN B-12: CPT | Performed by: INTERNAL MEDICINE

## 2019-11-07 PROCEDURE — 97161 PT EVAL LOW COMPLEX 20 MIN: CPT | Performed by: PHYSICAL THERAPIST

## 2019-11-07 PROCEDURE — 97165 OT EVAL LOW COMPLEX 30 MIN: CPT

## 2019-11-07 PROCEDURE — 85025 COMPLETE CBC W/AUTO DIFF WBC: CPT | Performed by: INTERNAL MEDICINE

## 2019-11-07 PROCEDURE — 99231 SBSQ HOSP IP/OBS SF/LOW 25: CPT | Performed by: INTERNAL MEDICINE

## 2019-11-07 RX ORDER — PREDNISONE 20 MG/1
40 TABLET ORAL
Qty: 5 TABLET | Refills: 0 | Status: SHIPPED | OUTPATIENT
Start: 2019-11-08 | End: 2019-11-13

## 2019-11-07 RX ORDER — FERROUS SULFATE TAB EC 324 MG (65 MG FE EQUIVALENT) 324 (65 FE) MG
324 TABLET DELAYED RESPONSE ORAL 2 TIMES DAILY WITH MEALS
Status: DISCONTINUED | OUTPATIENT
Start: 2019-11-07 | End: 2019-11-07 | Stop reason: HOSPADM

## 2019-11-07 RX ORDER — IPRATROPIUM BROMIDE AND ALBUTEROL SULFATE 2.5; .5 MG/3ML; MG/3ML
3 SOLUTION RESPIRATORY (INHALATION)
Status: DISCONTINUED | OUTPATIENT
Start: 2019-11-07 | End: 2019-11-07 | Stop reason: HOSPADM

## 2019-11-07 RX ORDER — FERROUS SULFATE TAB EC 324 MG (65 MG FE EQUIVALENT) 324 (65 FE) MG
324 TABLET DELAYED RESPONSE ORAL 2 TIMES DAILY WITH MEALS
Qty: 60 TABLET | Refills: 1 | Status: SHIPPED | OUTPATIENT
Start: 2019-11-07 | End: 2019-11-18 | Stop reason: SDUPTHER

## 2019-11-07 RX ORDER — PANTOPRAZOLE SODIUM 40 MG/1
40 TABLET, DELAYED RELEASE ORAL
Qty: 60 TABLET | Refills: 1 | Status: SHIPPED | OUTPATIENT
Start: 2019-11-07 | End: 2019-11-18 | Stop reason: SDUPTHER

## 2019-11-07 RX ADMIN — LAMOTRIGINE 200 MG: 100 TABLET ORAL at 08:50

## 2019-11-07 RX ADMIN — LISINOPRIL 20 MG: 20 TABLET ORAL at 08:50

## 2019-11-07 RX ADMIN — PREDNISONE 40 MG: 20 TABLET ORAL at 08:47

## 2019-11-07 RX ADMIN — SODIUM CHLORIDE, PRESERVATIVE FREE 10 ML: 5 INJECTION INTRAVENOUS at 08:52

## 2019-11-07 RX ADMIN — DILTIAZEM HYDROCHLORIDE 180 MG: 180 CAPSULE, COATED, EXTENDED RELEASE ORAL at 08:50

## 2019-11-07 RX ADMIN — CLOPIDOGREL BISULFATE 75 MG: 75 TABLET ORAL at 08:50

## 2019-11-07 RX ADMIN — FERROUS SULFATE TAB EC 324 MG (65 MG FE EQUIVALENT) 324 MG: 324 (65 FE) TABLET DELAYED RESPONSE at 11:19

## 2019-11-07 RX ADMIN — ROSUVASTATIN CALCIUM 20 MG: 20 TABLET, FILM COATED ORAL at 08:50

## 2019-11-07 RX ADMIN — PANTOPRAZOLE SODIUM 40 MG: 40 TABLET, DELAYED RELEASE ORAL at 08:47

## 2019-11-07 RX ADMIN — BUDESONIDE AND FORMOTEROL FUMARATE DIHYDRATE 2 PUFF: 160; 4.5 AEROSOL RESPIRATORY (INHALATION) at 08:28

## 2019-11-07 NOTE — PLAN OF CARE
Problem: Patient Care Overview  Goal: Plan of Care Review  Outcome: Ongoing (interventions implemented as appropriate)   11/07/19 0356   Coping/Psychosocial   Plan of Care Reviewed With patient   Plan of Care Review   Progress no change   OTHER   Outcome Summary No complaints at this time. Hgb last night was 9.5. Pt resting at this time, vss.        Problem: Gastrointestinal Bleeding (Adult)  Goal: Signs and Symptoms of Listed Potential Problems Will be Absent, Minimized or Managed (Gastrointestinal Bleeding)  Outcome: Ongoing (interventions implemented as appropriate)      Problem: Fall Risk (Adult)  Goal: Identify Related Risk Factors and Signs and Symptoms  Outcome: Outcome(s) achieved Date Met: 11/07/19    Goal: Absence of Fall  Outcome: Ongoing (interventions implemented as appropriate)

## 2019-11-07 NOTE — PLAN OF CARE
Problem: Patient Care Overview  Goal: Plan of Care Review  Outcome: Ongoing (interventions implemented as appropriate)   11/07/19 0117   Coping/Psychosocial   Plan of Care Reviewed With patient   Plan of Care Review   Progress improving   OTHER   Outcome Summary Patient will be discharged home today.

## 2019-11-07 NOTE — THERAPY DISCHARGE NOTE
Acute Care - Physical Therapy Initial Eval/Discharge  [facility]     Patient Name: Xander Wallace  : 1940  MRN: [id number]  Today's Date: 2019   Onset of Illness/Injury or Date of Surgery: 19  Date of Referral to PT: 19  Referring Physician: Dr Tatum      Admit Date: 2019    Visit Dx:    ICD-10-CM ICD-9-CM   1. Symptomatic anemia D64.9 285.9   2. Impaired mobility and ADLs Z74.09 799.89     Patient Active Problem List   Diagnosis   • Seizure disorder (CMS/HCC)   • Hypertension   • Coronary artery disease involving coronary bypass graft   • COPD (chronic obstructive pulmonary disease) (CMS/HCC)   • Hyperlipidemia   • Peripheral arterial disease (CMS/HCC)   • Sleep apnea   • Asthma   • Benign prostatic hyperplasia with lower urinary tract symptoms   • Chronic renal insufficiency, stage 2 (mild)   • Coronary artery disease   • Cardiac murmur   • Symptomatic anemia     Past Medical History:   Diagnosis Date   • Aortic stenosis    • Asthma    • COPD (chronic obstructive pulmonary disease) (CMS/HCC)    • Coronary artery disease involving coronary bypass graft     CABG , stent , stent    • Emphysema of lung (CMS/HCC)    • Hyperlipidemia    • Hypertension    • PVD (peripheral vascular disease) (CMS/HCC)    • Seizure disorder (CMS/HCC)    • Skin cancer of lip    • Sleep apnea      Past Surgical History:   Procedure Laterality Date   • APPENDECTOMY     • CARDIAC SURGERY      CABG    • CAROTID STENT     • COLON SURGERY     • CORONARY ARTERY BYPASS GRAFT     • HEMORRHOIDECTOMY     • HERNIA REPAIR            PT ASSESSMENT (last 12 hours)      Physical Therapy Evaluation     Row Name 19 1301          PT Evaluation Time/Intention    Document Type  evaluation  -CB     Mode of Treatment  occupational therapy;co-treatment;physical therapy  -CB     Patient Effort  excellent  -CB     Symptoms Noted During/After Treatment  none  -CB     Row Name 19 2385           General Information    Patient Profile Reviewed?  yes  -CB     Onset of Illness/Injury or Date of Surgery  11/05/19  -CB     Referring Physician  Dr Tatum  -CB     Patient Observations  alert;cooperative;agree to therapy  -CB     Patient/Family Observations  wife present  -CB     General Observations of Patient  supine with IV  -CB     Prior Level of Function  independent:;gait;ADL's  -CB     Equipment Currently Used at Home  walker, rolling  -CB     Pertinent History of Current Functional Problem  SOB and fatigue  -CB     Existing Precautions/Restrictions  no known precautions/restrictions  -CB     Limitations/Impairments  hearing  -CB     Equipment Issued to Patient  -- none  -CB     Risks Reviewed  patient:;spouse/S.O.:;dizziness;increased discomfort  -CB     Benefits Reviewed  patient:;spouse/S.O.:  -CB     Row Name 11/07/19 1304          Relationship/Environment    Lives With  spouse  -CB     Row Name 11/07/19 1304          Resource/Environmental Concerns    Current Living Arrangements  home/apartment/condo  -CB     Row Name 11/07/19 1304          Living Environment    Home Accessibility  stairs to enter home  -CB     Row Name 11/07/19 1304          Home Main Entrance    Number of Stairs, Main Entrance  two  -CB     Stair Railings, Main Entrance  railings on both sides of stairs  -CB     Row Name 11/07/19 1304          Cognitive Assessment/Intervention- PT/OT    Orientation Status (Cognition)  oriented x 4  -CB     Follows Commands (Cognition)  WFL  -CB     Row Name 11/07/19 1304          Safety Issues, Functional Mobility    Impairments Affecting Function (Mobility)  endurance/activity tolerance;shortness of breath  -CB     Row Name 11/07/19 1304          Bed Mobility Assessment/Treatment    Bed Mobility Assessment/Treatment  supine-sit-supine  -CB     Supine-Sit-Supine Lohman (Bed Mobility)  independent  -CB     Row Name 11/07/19 1304          Transfer Assessment/Treatment    Transfer  Assessment/Treatment  sit-stand transfer;stand-sit transfer  -CB     Sit-Stand [place] (Transfers)  independent  -CB     Stand-Sit [place] (Transfers)  independent  -CB     Row Name 11/07/19 1304          Gait/Stairs Assessment/Training    Gait/Stairs Assessment/Training  gait/ambulation independence;gait/ambulation assistive device;distance ambulated  -CB     [place] Level (Gait)  supervision  -CB     Assistive Device (Gait)  walker, front-wheeled  -CB     Distance in Feet (Gait)  400 feet  -CB     Row Name 11/07/19 1304          General ROM    GENERAL ROM COMMENTS  AROM WFL BLE- not formally testedd but observed  -CB     Row Name 11/07/19 1304          MMT (Manual Muscle Testing)    General MMT Comments  grossly BLE 3+/5  -CB     Row Name 11/07/19 1304          Sensory Assessment/Intervention    Sensory General Assessment  no sensation deficits identified  -CB     Row Name 11/07/19 1304          Vision Assessment/Intervention    Visual Impairment/Limitations  corrective lenses full time  -CB     Row Name 11/07/19 1304          Pain Assessment    Additional Documentation  Pain Scale: Numbers Pre/Post-Treatment (Group)  -CB     Row Name 11/07/19 1304          Pain Scale: Numbers Pre/Post-Treatment    Pain Scale: Numbers, Pretreatment  0/10 - no pain  -CB     Pain Scale: Numbers, Post-Treatment  0/10 - no pain  -CB     Row Name 11/07/19 1304          Physical Therapy Clinical Impression    Date of Referral to PT  11/07/19  -CB     PT Diagnosis (PT Clinical Impression)  anemia  -CB     Criteria for Skilled Interventions Met (PT Clinical Impression)  treatment indicated;no  -CB     Care Plan Review (PT)  evaluation/treatment results reviewed  -CB     Row Name 11/07/19 1304          Vital Signs    Pre Systolic BP Rehab  130  -CB     Pre Treatment Diastolic BP  60  -CB     Post Systolic BP Rehab  140  -CB     Post Treatment Diastolic BP  62  -CB     Pretreatment Heart Rate (beats/min)  75  -CB      Intratreatment Heart Rate (beats/min)  91  -CB     Posttreatment Heart Rate (beats/min)  94  -CB     Pre SpO2 (%)  94  -CB     O2 Delivery Pre Treatment  room air  -CB     Intra SpO2 (%)  97  -CB     O2 Delivery Intra Treatment  room air  -CB     Post SpO2 (%)  97  -CB     O2 Delivery Post Treatment  room air  -CB     Row Name 11/07/19 1304          Positioning and Restraints    Pre-Treatment Position  in bed  -CB     Post Treatment Position  bed  -CB       User Key  (r) = Recorded By, (t) = Taken By, (c) = Cosigned By    Initials Name Provider Type    Ashleigh Sanabria, PT Physical Therapist              PT Recommendation and Plan  Anticipated Discharge Disposition (PT): home with assist  Outcome Summary/Treatment Plan (PT)  Anticipated Equipment Needs at Discharge (PT): (none)  Anticipated Discharge Disposition (PT): home with assist  Plan of Care Reviewed With: (P) patient, spouse  Outcome Summary: (P) PT eval completed as coeval with OT, Able to come to sit EOB with HOB down and no rails as at home,independently, able to come to stand with rolling walker and ambulate 400 feet independently, able to go up and down 2 steps with one rail like at home independently, no need for skilled PT or follow up with home health for therapy once home    Outcome Measures     Row Name 11/07/19 1500 11/07/19 1304 11/07/19 1303       How much help from another person do you currently need...    Turning from your back to your side while in flat bed without using bedrails?  --  4  -CB  --    Moving from lying on back to sitting on the side of a flat bed without bedrails?  --  4  -CB  --    Moving to and from a bed to a chair (including a wheelchair)?  --  4  -CB  --    Standing up from a chair using your arms (e.g., wheelchair, bedside chair)?  --  4  -CB  --    Climbing 3-5 steps with a railing?  --  4  -CB  --    To walk in hospital room?  --  4  -CB  --    AM-PAC 6 Clicks Score (PT)  --  24  -CB  --       How much help from  another is currently needed...    Putting on and taking off regular lower body clothing?  --  --  4  -AS    Bathing (including washing, rinsing, and drying)  --  --  3  -AS    Toileting (which includes using toilet bed pan or urinal)  --  --  4  -AS    Putting on and taking off regular upper body clothing  --  --  4  -AS    Taking care of personal grooming (such as brushing teeth)  --  --  4  -AS    Eating meals  --  --  4  -AS    AM-PAC 6 Clicks Score (OT)  --  --  23  -AS       Functional Assessment    Outcome Measure Options  AM-PAC 6 Clicks Basic Mobility (PT)  -CB  AM-PAC 6 Clicks Basic Mobility (PT)  -CB  AM-PAC 6 Clicks Daily Activity (OT)  -AS      User Key  (r) = Recorded By, (t) = Taken By, (c) = Cosigned By    Initials Name Provider Type    Ashleigh Sanabria, PT Physical Therapist    AS Deann Franz, OT Occupational Therapist           Time Calculation:   PT Charges     Row Name 11/07/19 1514             Time Calculation    Start Time  1303  -CB      Stop Time  1330  -CB      Time Calculation (min)  27 min  -CB      PT Received On  11/07/19  -CB        User Key  (r) = Recorded By, (t) = Taken By, (c) = Cosigned By    Initials Name Provider Type    Ashleigh Sanabria, PT Physical Therapist        Therapy Charges for Today     Code Description Service Date Service Provider Modifiers Qty    46620838591 HC PT EVAL LOW COMPLEXITY 2 11/7/2019 Ashleigh Morgan, PT GP 1          PT G-Codes  Outcome Measure Options: AM-PAC 6 Clicks Basic Mobility (PT)  AM-PAC 6 Clicks Score (PT): 24  AM-PAC 6 Clicks Score (OT): 23    PT Discharge Summary  Anticipated Discharge Disposition (PT): home with assist    Ashleigh Morgan, PT  11/7/2019

## 2019-11-07 NOTE — DISCHARGE INSTR - LAB
Go to Endoscopy Department (2nd floor of the [facility]) on Monday November 18, 2019 at 7:30 AM to initiate capsule study with Dr. Conti. Please bring back the belt and other capsule study equipment the next day on Tuesday November 19, 2019 when you come for your wife's appointment.

## 2019-11-07 NOTE — DISCHARGE SUMMARY
[facility] Medicine Services  DISCHARGE SUMMARY       Date of Admission: 11/5/2019  Date of Discharge:  11/7/2019  Primary Care Physician: Deni Garcia MD    Presenting Problem/History of Present Illness:  Symptomatic anemia [D64.9]   79 yea old male patient with history of COPD, coronary artery disease status post CABG who came to the hospital for direct admission after recent labs reported hemoglobin of 6.2. This blood work was ordered as patient complained of 2 week of slowly progressive fatigue and SOB. He also ports episode of blood before each bowel movement. Family also reported some weight loss and loss of appetite. He denies melena or hematemesis.     Final Discharge Diagnoses:  Active Hospital Problems    Diagnosis   • Symptomatic anemia       Consults:   Consults     Date and Time Order Name Status Description    11/5/2019 1421 Inpatient Gastroenterology Consult Completed           Procedures Performed: Procedure(s):  ESOPHAGOGASTRODUODENOSCOPY  COLONOSCOPY           11/06 1050 UPPER GI ENDOSCOPY  11/06 1050 COLONOSCOPY    Pertinent Test Results:   Lab Results (last 7 days)     Procedure Component Value Units Date/Time    Basic Metabolic Panel [[id number]]  (Normal) Collected:  11/07/19 0617    Specimen:  Blood Updated:  11/07/19 0734     Glucose 93 mg/dL      BUN 18 mg/dL      Creatinine 1.08 mg/dL      Sodium 142 mmol/L      Potassium 3.8 mmol/L      Chloride 106 mmol/L      CO2 24.0 mmol/L      Calcium 8.7 mg/dL      eGFR Non African Amer 66 mL/min/1.73      BUN/Creatinine Ratio 16.7     Anion Gap 12.0 mmol/L     Narrative:       GFR Normal >60  Chronic Kidney Disease <60  Kidney Failure <15    CBC Auto Differential [[id number]]  (Abnormal) Collected:  11/07/19 0617    Specimen:  Blood Updated:  11/07/19 0715     WBC 11.09 10*3/mm3      RBC 3.43 10*6/mm3      Hemoglobin 9.7 g/dL      Hematocrit 29.2 %      MCV 85.1 fL      MCH 28.3 pg      MCHC 33.2 g/dL       RDW 13.7 %      RDW-SD 42.9 fl      MPV 10.0 fL      Platelets 209 10*3/mm3      Neutrophil % 81.6 %      Lymphocyte % 8.1 %      Monocyte % 9.7 %      Eosinophil % 0.1 %      Basophil % 0.1 %      Immature Grans % 0.4 %      Neutrophils, Absolute 9.05 10*3/mm3      Lymphocytes, Absolute 0.90 10*3/mm3      Monocytes, Absolute 1.08 10*3/mm3      Eosinophils, Absolute 0.01 10*3/mm3      Basophils, Absolute 0.01 10*3/mm3      Immature Grans, Absolute 0.04 10*3/mm3      nRBC 0.0 /100 WBC     Vitamin B12 [[id number]] Collected:  11/07/19 0617    Specimen:  Blood Updated:  11/07/19 0704    Tissue Pathology Exam [[id number]] Collected:  11/06/19 1103    Specimen:  Tissue from Small Intestine; Tissue from Esophagus; Polyp from Large Intestine, Right / Ascending Colon; Polyp from Large Intestine, Sigmoid Colon Updated:  11/07/19 0644    Hemoglobin & Hematocrit, Blood [[id number]]  (Abnormal) Collected:  11/06/19 2003    Specimen:  Blood Updated:  11/06/19 2008     Hemoglobin 9.5 g/dL      Hematocrit 28.0 %     Vitamin B12 [[id number]]  (Normal) Collected:  11/06/19 0853    Specimen:  Blood Updated:  11/06/19 1939     Vitamin B-12 677 pg/mL     Basic Metabolic Panel [[id number]]  (Abnormal) Collected:  11/06/19 0853    Specimen:  Blood Updated:  11/06/19 0945     Glucose 145 mg/dL      BUN 15 mg/dL      Creatinine 1.20 mg/dL      Sodium 139 mmol/L      Potassium 4.1 mmol/L      Chloride 104 mmol/L      CO2 24.0 mmol/L      Calcium 8.7 mg/dL      eGFR Non African Amer 58 mL/min/1.73      BUN/Creatinine Ratio 12.5     Anion Gap 11.0 mmol/L     Narrative:       GFR Normal >60  Chronic Kidney Disease <60  Kidney Failure <15    CBC & Differential [[id number]] Collected:  11/06/19 0853    Specimen:  Blood Updated:  11/06/19 0915    Narrative:       The following orders were created for panel order CBC & Differential.  Procedure                               Abnormality         Status                     ---------                                -----------         ------                     CBC Auto Differential[[id number]]        Abnormal            Final result                 Please view results for these tests on the individual orders.    CBC Auto Differential [[id number]]  (Abnormal) Collected:  11/06/19 0853    Specimen:  Blood Updated:  11/06/19 0915     WBC 4.18 10*3/mm3      RBC 3.40 10*6/mm3      Hemoglobin 9.6 g/dL      Hematocrit 29.1 %      MCV 85.6 fL      MCH 28.2 pg      MCHC 33.0 g/dL      RDW 13.7 %      RDW-SD 42.6 fl      MPV 9.7 fL      Platelets 196 10*3/mm3      Neutrophil % 88.8 %      Lymphocyte % 9.1 %      Monocyte % 1.4 %      Eosinophil % 0.0 %      Basophil % 0.0 %      Immature Grans % 0.7 %      Neutrophils, Absolute 3.71 10*3/mm3      Lymphocytes, Absolute 0.38 10*3/mm3      Monocytes, Absolute 0.06 10*3/mm3      Eosinophils, Absolute 0.00 10*3/mm3      Basophils, Absolute 0.00 10*3/mm3      Immature Grans, Absolute 0.03 10*3/mm3      nRBC 0.0 /100 WBC     Vitamin B12 [[id number]]  (Normal) Collected:  11/05/19 1447    Specimen:  Blood Updated:  11/05/19 2024     Vitamin B-12 530 pg/mL     Folate [[id number]]  (Normal) Collected:  11/05/19 1447    Specimen:  Blood Updated:  11/05/19 2024     Folate >20.00 ng/mL     T4, Free [[id number]]  (Normal) Collected:  11/05/19 1447    Specimen:  Blood Updated:  11/05/19 1530     Free T4 1.00 ng/dL     TSH [[id number]]  (Normal) Collected:  11/05/19 1447    Specimen:  Blood Updated:  11/05/19 1530     TSH 1.610 uIU/mL     Comprehensive Metabolic Panel [[id number]]  (Abnormal) Collected:  11/05/19 1447    Specimen:  Blood Updated:  11/05/19 1524     Glucose 97 mg/dL      BUN 19 mg/dL      Creatinine 1.39 mg/dL      Sodium 139 mmol/L      Potassium 4.3 mmol/L      Chloride 105 mmol/L      CO2 25.0 mmol/L      Calcium 8.5 mg/dL      Total Protein 6.2 g/dL      Albumin 3.80 g/dL      ALT (SGPT) 13 U/L      AST (SGOT) 16 U/L      Alkaline Phosphatase 82 U/L      Total Bilirubin <0.2 mg/dL       eGFR Non African Amer 49 mL/min/1.73      Globulin 2.4 gm/dL      A/G Ratio 1.6 g/dL      BUN/Creatinine Ratio 13.7     Anion Gap 9.0 mmol/L     Narrative:       GFR Normal >60  Chronic Kidney Disease <60  Kidney Failure <15    Phosphorus [[id number]]  (Normal) Collected:  11/05/19 1447    Specimen:  Blood Updated:  11/05/19 1524     Phosphorus 2.7 mg/dL     Magnesium [[id number]]  (Normal) Collected:  11/05/19 1447    Specimen:  Blood Updated:  11/05/19 1524     Magnesium 2.1 mg/dL     Iron Profile [[id number]]  (Abnormal) Collected:  11/05/19 1447    Specimen:  Blood Updated:  11/05/19 1524     Iron 16 mcg/dL      Iron Saturation 4 %      Transferrin 270 mg/dL      TIBC 402 mcg/dL     Protime-INR [[id number]]  (Normal) Collected:  11/05/19 1447    Specimen:  Blood Updated:  11/05/19 1515     Protime 13.3 Seconds      INR 1.03    Narrative:       Therapeutic range for most indications is 2.0-3.0 INR,  or 2.5-3.5 for mechanical heart valves.    CBC (No Diff) [[id number]]  (Abnormal) Collected:  11/05/19 1447    Specimen:  Blood Updated:  11/05/19 1501     WBC 4.78 10*3/mm3      RBC 2.27 10*6/mm3      Hemoglobin 6.2 g/dL      Hematocrit 19.5 %      MCV 85.9 fL      MCH 27.3 pg      MCHC 31.8 g/dL      RDW 13.2 %      RDW-SD 41.6 fl      MPV 9.3 fL      Platelets 188 10*3/mm3         Imaging Results (Last 7 Days)     ** No results found for the last 168 hours. **            Chief Complaint on Day of Discharge: None.    Hospital Course:  The patient was admitted for anemia of chronic blood loss likely from GI source, made n.p.o., started on Protonix infusion and received transfusion of packed red blood cells.  He was seen by the gastroenterologist and had upper GI endoscopy which showed some esophagitis. He also had colonoscopy that showed some sessile polyps that were removed, diverticular disease and nonbleeding hemorrhoids.  Hemoglobin was stable posttransfusion and he was cleared for discharge by the  "gastroenterologist. Patient was also started on iron supplementation secondary to low serum iron.    He was also treated for acute exacerbation of COPD with a combination of bronchodilators and steroids.  He was asymptomatic and saturating in the mid 90s on room air prior to discharge.      Condition on Discharge: Stable and improved.       Physical Exam on Discharge:  /79 (BP Location: Right arm, Patient Position: Lying)   Pulse 85   Temp 98 °F (36.7 °C) (Oral)   Resp 18   Ht 175.3 cm (69\")   Wt 81.3 kg (179 lb 4.8 oz)   SpO2 95%   BMI 26.48 kg/m²   Physical Exam   Constitutional: He is oriented to person, place, and time. He appears well-developed and well-nourished. No distress.   HENT:   Head: Normocephalic and atraumatic.   Eyes: EOM are normal. Pupils are equal, round, and reactive to light. No scleral icterus.   Neck: Normal range of motion. Neck supple. No JVD present. No thyromegaly present.   Cardiovascular: Normal rate, regular rhythm and normal heart sounds. Exam reveals no gallop and no friction rub.   No murmur heard.  Pulmonary/Chest: Effort normal and breath sounds normal. He has no wheezes. He has no rales. He exhibits no tenderness.   Abdominal: Soft. Bowel sounds are normal. He exhibits no distension and no mass. There is no tenderness. There is no rebound and no guarding.   Musculoskeletal: He exhibits no edema, tenderness or deformity.   Neurological: He is alert and oriented to person, place, and time. No cranial nerve deficit or sensory deficit. He exhibits normal muscle tone. Coordination normal.   Skin: Skin is warm and dry. No rash noted. He is not diaphoretic. No erythema. No pallor.   Psychiatric: He has a normal mood and affect. His behavior is normal. Judgment and thought content normal.   Nursing note and vitals reviewed.        Discharge Disposition:  Home or Self Care    Discharge Medications:     Discharge Medications      New Medications      Instructions Start Date "   ferrous sulfate 324 (65 Fe) MG tablet delayed-release EC tablet   324 mg, Oral, 2 Times Daily With Meals      pantoprazole 40 MG EC tablet  Commonly known as:  PROTONIX   40 mg, Oral, 2 Times Daily Before Meals      predniSONE 20 MG tablet  Commonly known as:  DELTASONE   40 mg, Oral, Daily With Breakfast   Start Date:  11/8/2019        Continue These Medications      Instructions Start Date   albuterol sulfate  (90 Base) MCG/ACT inhaler  Commonly known as:  PROVENTIL HFA;VENTOLIN HFA;PROAIR HFA   2 puffs, Inhalation, Every 4 Hours PRN      budesonide-formoterol 160-4.5 MCG/ACT inhaler  Commonly known as:  SYMBICORT   2 puffs, Inhalation, 2 Times Daily - RT      clopidogrel 75 MG tablet  Commonly known as:  PLAVIX   75 mg, Oral, Daily      dilTIAZem  MG 24 hr capsule  Commonly known as:  CARDIZEM CD   180 mg, Oral, Daily      fish oil 1000 MG capsule capsule   Oral, Daily With Breakfast      gabapentin 600 MG tablet  Commonly known as:  NEURONTIN   600 mg, Oral, 3 Times Daily      lamoTRIgine 200 MG tablet  Commonly known as:  LaMICtal   200 mg, Oral, Daily      lisinopril 20 MG tablet  Commonly known as:  PRINIVIL,ZESTRIL   20 mg, Oral, Daily      mupirocin 2 % cream  Commonly known as:  BACTROBAN   Topical, 3 Times Daily      nitroglycerin 0.4 MG SL tablet  Commonly known as:  NITROSTAT   1 under the tongue as needed for angina, may repeat q5mins for up three doses      rosuvastatin 20 MG tablet  Commonly known as:  CRESTOR   20 mg, Oral, Daily      SUPER B-COMPLEX tablet   Oral      tamsulosin 0.4 MG capsule 24 hr capsule  Commonly known as:  FLOMAX   1 capsule, Oral, Nightly      triamcinolone 0.5 % cream  Commonly known as:  KENALOG   No dose, route, or frequency recorded.             Discharge Diet: Heart healthy..    Activity at Discharge: As tolerated.    Discharge Care Plan/Instructions: Patient has been advised to take his medications as prescribed and to return to the emergency room in the  event of any worsening symptoms.    Follow-up Appointments:   Future Appointments   Date Time Provider Department Center   11/18/2019  9:30 AM Deni Garcia MD [facility] None   11/20/2019  1:45 PM Homero Pearl MD [facility] None   11/26/2019 10:30 AM Judith Conti MD [facility] None   3/16/2020  9:30 AM Mike Montes MD [facility] None       Test Results Pending at Discharge:    Order Current Status    Tissue Pathology Exam In process    Vitamin B12 In process          Ryan Tatum MD  11/07/19  10:45 AM    Time: 35 minutes.

## 2019-11-07 NOTE — PLAN OF CARE
Problem: Patient Care Overview  Goal: Plan of Care Review  Outcome: Outcome(s) achieved Date Met: 11/07/19 11/07/19 1983   Coping/Psychosocial   Plan of Care Reviewed With patient;spouse   OTHER   Outcome Summary PT eval completed as coeval with OT, Able to come to sit EOB with HOB down and no rails as at home,independently, able to come to stand with rolling walker and ambulate 400 feet independently, able to go up and down 2 steps with one rail like at home independently, no need for skilled PT or follow up with home health for therapy once home

## 2019-11-07 NOTE — THERAPY DISCHARGE NOTE
Acute Care - Occupational Therapy Initial Evaluation  [facility]     Patient Name: Xander Wallace  : 1940  MRN: [id number]  Today's Date: 2019  Onset of Illness/Injury or Date of Surgery: 19  Date of Referral to OT: 19  Referring Physician: Dr. Tatum    Admit Date: 2019       ICD-10-CM ICD-9-CM   1. Symptomatic anemia D64.9 285.9   2. Impaired mobility and ADLs Z74.09 799.89     Patient Active Problem List   Diagnosis   • Seizure disorder (CMS/HCC)   • Hypertension   • Coronary artery disease involving coronary bypass graft   • COPD (chronic obstructive pulmonary disease) (CMS/HCC)   • Hyperlipidemia   • Peripheral arterial disease (CMS/HCC)   • Sleep apnea   • Asthma   • Benign prostatic hyperplasia with lower urinary tract symptoms   • Chronic renal insufficiency, stage 2 (mild)   • Coronary artery disease   • Cardiac murmur   • Symptomatic anemia     Past Medical History:   Diagnosis Date   • Aortic stenosis    • Asthma    • COPD (chronic obstructive pulmonary disease) (CMS/HCC)    • Coronary artery disease involving coronary bypass graft     CABG , stent , stent    • Emphysema of lung (CMS/HCC)    • Hyperlipidemia    • Hypertension    • PVD (peripheral vascular disease) (CMS/HCC)    • Seizure disorder (CMS/HCC)    • Skin cancer of lip    • Sleep apnea      Past Surgical History:   Procedure Laterality Date   • APPENDECTOMY     • CARDIAC SURGERY      CABG    • CAROTID STENT     • COLON SURGERY     • CORONARY ARTERY BYPASS GRAFT     • HEMORRHOIDECTOMY     • HERNIA REPAIR            OT ASSESSMENT FLOWSHEET (last 12 hours)      Occupational Therapy Evaluation     Row Name 19 1303                   OT Evaluation Time/Intention    Subjective Information  no complaints  -AS        Document Type  evaluation  -AS        Mode of Treatment  occupational therapy;co-treatment;physical therapy  -AS        Total Evaluation Minutes, Occupational Therapy  27   -AS        Patient Effort  excellent  -AS           General Information    Patient Profile Reviewed?  yes  -AS        Onset of Illness/Injury or Date of Surgery  11/05/19  -AS        Referring Physician  Dr. Tatum  -AS        Patient Observations  alert;cooperative;agree to therapy  -AS        Patient/Family Observations  wife present  -AS        General Observations of Patient  supine, RA, IV  -AS        Prior Level of Function  independent:;gait;transfer;w/c or scooter;bed mobility;ADL's;driving wife does cooking, cleaning, shopping  -AS        Equipment Currently Used at Home  walker, rolling  -AS        Pertinent History of Current Functional Problem  Pt is an 78 yo F admitted for symptomatic anemia  -AS        Existing Precautions/Restrictions  no known precautions/restrictions  -AS        Limitations/Impairments  hearing  -AS        Risks Reviewed  patient:;spouse/S.O.:;nausea/vomiting;LOB;dizziness;increased discomfort;change in vital signs;increased drainage;lines disloged  -AS        Benefits Reviewed  patient:;spouse/S.O.:;improve function;increase independence;increase strength;decrease pain;increase balance;decrease risk of DVT;improve skin integrity;increase knowledge  -AS           Relationship/Environment    Primary Source of Support/Comfort  spouse  -AS        Lives With  spouse  -AS           Resource/Environmental Concerns    Current Living Arrangements  home/apartment/condo one level home  -AS           Home Main Entrance    Number of Stairs, Main Entrance  two  -AS        Stair Railings, Main Entrance  railings on both sides of stairs  -AS           Cognitive Assessment/Intervention- PT/OT    Orientation Status (Cognition)  oriented x 4  -AS        Follows Commands (Cognition)  WFL  -AS           Safety Issues, Functional Mobility    Impairments Affecting Function (Mobility)  endurance/activity tolerance;shortness of breath  -AS           Bed Mobility Assessment/Treatment    Bed Mobility  Assessment/Treatment  supine-sit-supine  -AS        Supine-Sit-Supine [place] (Bed Mobility)  independent  -AS           Functional Mobility    Functional Mobility- Ind. Level  supervision required  -AS        Functional Mobility- Device  rolling walker  -AS           Transfer Assessment/Treatment    Transfer Assessment/Treatment  sit-stand transfer;stand-sit transfer  -AS           Sit-Stand Transfer    Sit-Stand [place] (Transfers)  independent  -AS           Stand-Sit Transfer    Stand-Sit [place] (Transfers)  independent  -AS           ADL Assessment/Intervention    BADL Assessment/Intervention  upper body dressing;lower body dressing  -AS           Upper Body Dressing Assessment/Training    Upper Body Dressing [place] Level  don;front opening garment;set up second hospital gown  -AS        Upper Body Dressing Position  unsupported standing  -AS           Lower Body Dressing Assessment/Training    Lower Body Dressing [place] Level  don;shoes/slippers;socks;set up  -AS        Lower Body Dressing Position  edge of bed sitting  -AS           BADL Safety/Performance    Impairments, BADL Safety/Performance  shortness of breath;endurance/activity tolerance  -AS           General ROM    GENERAL ROM COMMENTS  Not formally tested; WFL per functional observation  -AS           MMT (Manual Muscle Testing)    General MMT Comments  Not formally tested; WFL through functional observation  -AS           Positioning and Restraints    Pre-Treatment Position  in bed  -AS        Post Treatment Position  bed  -AS        In Bed  supine;call light within reach;with family/caregiver  -AS           Pain Assessment    Additional Documentation  Pain Scale: Numbers Pre/Post-Treatment (Group)  -AS           Pain Scale: Numbers Pre/Post-Treatment    Pain Scale: Numbers, Pretreatment  0/10 - no pain  -AS        Pain Scale: Numbers, Post-Treatment  0/10 - no pain  -AS           Plan of Care Review    Plan of Care  Reviewed With  patient  -AS           Clinical Impression (OT)    Date of Referral to OT  11/07/19  -AS        OT Diagnosis  impaired mobility and ADLs  -AS        Patient/Family Goals Statement (OT Eval)  return home  -AS        Therapy Frequency (OT Eval)  evaluation only  -AS        Anticipated Discharge Disposition (OT)  home with assist assist PRN  -AS           Vital Signs    Post Systolic BP Rehab  140  -AS        Post Treatment Diastolic BP  62  -AS        Intratreatment Heart Rate (beats/min)  91  -AS        Posttreatment Heart Rate (beats/min)  94  -AS        Intra SpO2 (%)  97  -AS        O2 Delivery Intra Treatment  supplemental O2  -AS        Post SpO2 (%)  97  -AS        O2 Delivery Post Treatment  room air  -AS           Discharge Summary (Occupational Therapy)    Additional Documentation  Discharge Summary, OT Eval (Group)  -AS           Discharge Summary, OT Eval    Reason for Discharge (OT Discharge Summary)  no further needs identified  -AS           Living Environment    Home Accessibility  stairs to enter home  -AS          User Key  (r) = Recorded By, (t) = Taken By, (c) = Cosigned By    Initials Name Effective Dates    AS Daenn Franz, OT 03/25/19 -                OT Recommendation and Plan  Outcome Summary/Treatment Plan (OT)  Anticipated Discharge Disposition (OT): home with assist(assist PRN)  Reason for Discharge (OT Discharge Summary): no further needs identified  Therapy Frequency (OT Eval): evaluation only  Plan of Care Review  Plan of Care Reviewed With: patient  Plan of Care Reviewed With: patient  Outcome Summary: OT eval completed; co-eval with PT. Pt performed bed mobility, sit<>stand, and t/fs with independence. Pt performed functional mobility with SPV using RW. Pt donned/doffed socks and shoes with s/u while seated EOB. Pt demo some SOB however O2 stayed 97% on RA. Pt edu on taking time when returning to daily tasks at home. No further skilled OT needs identified. Rec home  with assist as needed.     Outcome Measures     Row Name 11/07/19 1303             How much help from another is currently needed...    Putting on and taking off regular lower body clothing?  4  -AS      Bathing (including washing, rinsing, and drying)  3  -AS      Toileting (which includes using toilet bed pan or urinal)  4  -AS      Putting on and taking off regular upper body clothing  4  -AS      Taking care of personal grooming (such as brushing teeth)  4  -AS      Eating meals  4  -AS      AM-PAC 6 Clicks Score (OT)  23  -AS         Functional Assessment    Outcome Measure Options  AM-PAC 6 Clicks Daily Activity (OT)  -AS        User Key  (r) = Recorded By, (t) = Taken By, (c) = Cosigned By    Initials Name Provider Type    AS Deann Franz OT Occupational Therapist          Time Calculation:   Time Calculation- OT     Row Name 11/07/19 1406 11/07/19 1337          Time Calculation- OT    OT Start Time  1303  -AS  --     OT Stop Time  1330  -AS  --     OT Time Calculation (min)  27 min  -AS  --     OT Received On  11/07/19  -AS  --     OT - Next Appointment  --  11/08/19  -AS       User Key  (r) = Recorded By, (t) = Taken By, (c) = Cosigned By    Initials Name Provider Type    AS Deann Franz OT Occupational Therapist        Therapy Charges for Today     Code Description Service Date Service Provider Modifiers Qty    [id number]  OT EVAL LOW COMPLEXITY 2 11/7/2019 Deann Franz OT GO 1               Deann Franz OT  11/7/2019

## 2019-11-07 NOTE — PLAN OF CARE
Problem: Patient Care Overview  Goal: Plan of Care Review  Outcome: Ongoing (interventions implemented as appropriate)   11/07/19 4773   Coping/Psychosocial   Plan of Care Reviewed With patient   OTHER   Outcome Summary OT eval completed; co-eval with PT. Pt performed bed mobility, sit<>stand, and t/fs with independence. Pt performed functional mobility with SPV using RW. Pt donned/doffed socks and shoes with s/u while seated EOB. Pt demo some SOB however O2 stayed 97% on RA. Pt edu on taking time when returning to daily tasks at home. No further skilled OT needs identified. Rec home with assist as needed.

## 2019-11-07 NOTE — NURSING NOTE
Patient is being discharged. He stated he drove himself to the hospital, and he is going to drive himself home because his car is in the parking lot. Patient is alert and oriented. Wife is at bedside and is going to ride with patient being that she is unable to drive. No narcotics or sedating medications given this shift. Physical therapy and occupational therapy both cleared patient today stating that he no longer needed their services. Supa MORTENSEN charge nurse and Florence MORTENSEN  informed of situation and agreeable with this RN that patient is alert and oriented and can make the decision to drive himself home if he wishes to. No activity restrictions were written by Dr. Tatum in the discharge orders.

## 2019-11-07 NOTE — PROGRESS NOTES
SUBJECTIVE:   11/7/2019  Chief Complaint:     Subjective      Patient feels better today.  No GI complaints at this time.  Received iron infusion.  EGD was consistent with gastritis and esophagitis.  Colonoscopy was consistent with polyps, diverticulosis, hemorrhoidn and patchy area of congested mucosa.  History:  Past Medical History:   Diagnosis Date   • Aortic stenosis    • Asthma    • COPD (chronic obstructive pulmonary disease) (CMS/[facility])    • Coronary artery disease involving coronary bypass graft     CABG 2012, stent 2016, stent 2005   • Emphysema of lung (CMS/[facility])    • Hyperlipidemia    • Hypertension    • PVD (peripheral vascular disease) (CMS/[facility])    • Seizure disorder (CMS/[facility])    • Skin cancer of lip    • Sleep apnea      Past Surgical History:   Procedure Laterality Date   • APPENDECTOMY     • CARDIAC SURGERY      CABG 2012   • CAROTID STENT     • COLON SURGERY     • CORONARY ARTERY BYPASS GRAFT     • HEMORRHOIDECTOMY     • HERNIA REPAIR       Family History   Problem Relation Age of Onset   • Cancer Father      Social History     Tobacco Use   • Smoking status: Former Smoker   • Smokeless tobacco: Never Used   Substance Use Topics   • Alcohol use: No   • Drug use: No     Medications Prior to Admission   Medication Sig Dispense Refill Last Dose   • albuterol (PROVENTIL HFA;VENTOLIN HFA) 108 (90 Base) MCG/ACT inhaler Inhale 2 puffs Every 4 (Four) Hours As Needed for Wheezing. 3 inhaler 3 Past Week at Unknown time   • B Complex-Biotin-FA (SUPER B-COMPLEX) tablet Take  by mouth.   11/5/2019 at Unknown time   • budesonide-formoterol (SYMBICORT) 160-4.5 MCG/ACT inhaler Inhale 2 puffs 2 (Two) Times a Day.   11/5/2019 at Unknown time   • clopidogrel (PLAVIX) 75 MG tablet Take 1 tablet by mouth Daily. 90 tablet 3 11/5/2019 at Unknown time   • diltiaZEM CD (CARDIZEM CD) 180 MG 24 hr capsule Take 1 capsule by mouth Daily. 90 capsule 3 11/5/2019 at Unknown time   • gabapentin (NEURONTIN) 600 MG tablet Take  600 mg by mouth 3 (three) times a day.   11/5/2019 at Unknown time   • lamoTRIgine (LaMICtal) 200 MG tablet Take 200 mg by mouth daily.   11/5/2019 at Unknown time   • lisinopril (PRINIVIL,ZESTRIL) 20 MG tablet Take 1 tablet by mouth Daily. 90 tablet 3 11/5/2019 at Unknown time   • mupirocin (BACTROBAN) 2 % cream Apply  topically to the appropriate area as directed 3 (Three) Times a Day. 30 g 1 11/5/2019 at Unknown time   • nitroglycerin (NITROSTAT) 0.4 MG SL tablet 1 under the tongue as needed for angina, may repeat q5mins for up three doses 25 tablet 12 Past Week at Unknown time   • Omega-3 Fatty Acids (FISH OIL) 1000 MG capsule capsule Take  by mouth daily with breakfast.   11/5/2019 at Unknown time   • rosuvastatin (CRESTOR) 20 MG tablet Take 1 tablet by mouth Daily. 90 tablet 3 11/5/2019 at Unknown time   • tamsulosin (FLOMAX) 0.4 MG capsule 24 hr capsule Take 1 capsule by mouth Every Night. 90 capsule 3 11/4/2019 at Unknown time   • triamcinolone (KENALOG) 0.5 % cream    11/4/2019 at Unknown time     Allergies:  Doxycycline; Amoxicillin; Ceftin [cefuroxime]; Methylphenidate derivatives; Nystatin; Sulfur; and Theophyllines     CURRENT MEDICATIONS/OBJECTIVE/VS/PE:     Current Medications:     Current Facility-Administered Medications   Medication Dose Route Frequency Provider Last Rate Last Dose   • acetaminophen (TYLENOL) tablet 650 mg  650 mg Oral Q8H PRN Sarina Sandhu MD   650 mg at 11/05/19 1928   • budesonide-formoterol (SYMBICORT) 160-4.5 MCG/ACT inhaler 2 puff  2 puff Inhalation BID - RT Sarina Sandhu MD   2 puff at 11/07/19 0828   • calcium carbonate (TUMS) chewable tablet 500 mg (200 mg elemental)  1 tablet Oral BID PRN Sarina Sandhu MD       • clopidogrel (PLAVIX) tablet 75 mg  75 mg Oral Daily Sarina Sandhu MD   75 mg at 11/07/19 0850   • dextrose 5 % and sodium chloride 0.45 % infusion  30 mL/hr Intravenous Continuous PRN Judith Conti MD       •  dilTIAZem CD (CARDIZEM CD) 24 hr capsule 180 mg  180 mg Oral Daily Sarina Sandhu MD   180 mg at 11/07/19 0850   • ferrous sulfate EC tablet 324 mg  324 mg Oral BID With Meals Ryan Tatum MD   324 mg at 11/07/19 1119   • gabapentin (NEURONTIN) capsule 600 mg  600 mg Oral Q8H Sarina Sandhu MD   600 mg at 11/06/19 2142   • ipratropium-albuterol (DUO-NEB) nebulizer solution 3 mL  3 mL Nebulization Q4H PRN Katya Ortiz MD   3 mL at 11/06/19 0641   • ipratropium-albuterol (DUO-NEB) nebulizer solution 3 mL  3 mL Nebulization 4x Daily - RT Ryan Tatum MD       • lamoTRIgine (LaMICtal) tablet 200 mg  200 mg Oral Daily Sarina Sandhu MD   200 mg at 11/07/19 0850   • lisinopril (PRINIVIL,ZESTRIL) tablet 20 mg  20 mg Oral Daily Sarina Sandhu MD   20 mg at 11/07/19 0850   • ondansetron (ZOFRAN) injection 4 mg  4 mg Intravenous Q6H PRN Sarina Sandhu MD       • pantoprazole (PROTONIX) EC tablet 40 mg  40 mg Oral BID AC Sarina Sandhu MD   40 mg at 11/07/19 0847   • predniSONE (DELTASONE) tablet 40 mg  40 mg Oral Daily With Breakfast Sarina Sandhu MD   40 mg at 11/07/19 0847   • rosuvastatin (CRESTOR) tablet 20 mg  20 mg Oral Daily Sarina Sandhu MD   20 mg at 11/07/19 0850   • sodium chloride 0.9 % flush 10 mL  10 mL Intravenous Q12H Sarina Sandhu MD   10 mL at 11/07/19 0852   • sodium chloride 0.9 % flush 10 mL  10 mL Intravenous PRN Sarina Sandhu MD       • tamsulosin (FLOMAX) 24 hr capsule 0.4 mg  0.4 mg Oral Nightly Sarina Sandhu MD   0.4 mg at 11/06/19 2031       Objective     Review of Systems:   Review of Systems   Constitutional: Negative for activity change, appetite change, chills, diaphoresis, fatigue, fever and unexpected weight change.   HENT: Negative for congestion, sore throat and trouble swallowing.    Neck: no adenopathy, thyromegaly or masses.  Respiratory: Negative for  apnea, cough, choking, chest tightness, shortness of breath, wheezing and stridor.    Cardiovascular: Negative for chest pain, palpitations and leg swelling.   Gastrointestinal: Negative for abdominal distention, abdominal pain, anal bleeding, blood in stool, constipation, diarrhea, nausea, rectal pain and vomiting.   Musculoskeletal: Negative for arthralgias.   Extremities: no edema, cyanosis or clubbing.  Skin: Negative for color change, pallor, rash and wound.   Neurological: Negative for dizziness, syncope, weakness, light-headedness and headaches.   Psychiatric/Behavioral: Negative for agitation, behavioral problems, confusion and decreased concentration.       Physical Exam:   Temp:  [96.5 °F (35.8 °C)-98 °F (36.7 °C)] 98 °F (36.7 °C)  Heart Rate:  [61-85] 85  Resp:  [18-20] 18  BP: (142-175)/(60-79) 160/79     Physical Exam:  General Appearance:    Alert, cooperative, in no acute distress   Head:    Normocephalic, without obvious abnormality, atraumatic   Eyes:            Lids and lashes normal, conjunctivae and sclerae normal, no   icterus, no pallor, corneas clear, PERRLA   Ears:    Ears appear intact with no abnormalities noted   Throat:   No oral lesions, no thrush, oral mucosa moist   Neck:   No adenopathy, supple, trachea midline, no thyromegaly, no     carotid bruit, no JVD   Back:     No kyphosis present, no scoliosis present, no skin lesions,       erythema or scars, no tenderness to percussion or                   palpation,   range of motion normal   Lungs:     Clear to auscultation,respirations regular, even and                   unlabored    Heart:    Regular rhythm and normal rate, normal S1 and S2, no            murmur, no gallop, no rub, no click   Breast Exam:    Deferred   Abdomen:     Normal bowel sounds, no masses, no organomegaly, soft        non-tender, non-distended, no guarding, no rebound                 tenderness   Genitalia:    Deferred   Extremities:   Moves all extremities well,  no edema, no cyanosis, no              redness   Pulses:   Pulses palpable and equal bilaterally   Skin:   No bleeding, bruising or rash   Lymph nodes:   No palpable adenopathy   Neurologic:   Cranial nerves 2 - 12 grossly intact, sensation intact, DTR        present and equal bilaterally      Results Review:     Lab Results (last 24 hours)     Procedure Component Value Units Date/Time    Vitamin B12 [[id number]]  (Normal) Collected:  11/07/19 0617    Specimen:  Blood Updated:  11/07/19 1220     Vitamin B-12 541 pg/mL     Basic Metabolic Panel [[id number]]  (Normal) Collected:  11/07/19 0617    Specimen:  Blood Updated:  11/07/19 0734     Glucose 93 mg/dL      BUN 18 mg/dL      Creatinine 1.08 mg/dL      Sodium 142 mmol/L      Potassium 3.8 mmol/L      Chloride 106 mmol/L      CO2 24.0 mmol/L      Calcium 8.7 mg/dL      eGFR Non African Amer 66 mL/min/1.73      BUN/Creatinine Ratio 16.7     Anion Gap 12.0 mmol/L     Narrative:       GFR Normal >60  Chronic Kidney Disease <60  Kidney Failure <15    CBC Auto Differential [[id number]]  (Abnormal) Collected:  11/07/19 0617    Specimen:  Blood Updated:  11/07/19 0715     WBC 11.09 10*3/mm3      RBC 3.43 10*6/mm3      Hemoglobin 9.7 g/dL      Hematocrit 29.2 %      MCV 85.1 fL      MCH 28.3 pg      MCHC 33.2 g/dL      RDW 13.7 %      RDW-SD 42.9 fl      MPV 10.0 fL      Platelets 209 10*3/mm3      Neutrophil % 81.6 %      Lymphocyte % 8.1 %      Monocyte % 9.7 %      Eosinophil % 0.1 %      Basophil % 0.1 %      Immature Grans % 0.4 %      Neutrophils, Absolute 9.05 10*3/mm3      Lymphocytes, Absolute 0.90 10*3/mm3      Monocytes, Absolute 1.08 10*3/mm3      Eosinophils, Absolute 0.01 10*3/mm3      Basophils, Absolute 0.01 10*3/mm3      Immature Grans, Absolute 0.04 10*3/mm3      nRBC 0.0 /100 WBC     Tissue Pathology Exam [[id number]] Collected:  11/06/19 1103    Specimen:  Tissue from Small Intestine; Tissue from Esophagus; Polyp from Large Intestine, Right / Ascending  Colon; Polyp from Large Intestine, Sigmoid Colon Updated:  11/07/19 0644    Hemoglobin & Hematocrit, Blood [[id number]]  (Abnormal) Collected:  11/06/19 2003    Specimen:  Blood Updated:  11/06/19 2008     Hemoglobin 9.5 g/dL      Hematocrit 28.0 %     Vitamin B12 [[id number]]  (Normal) Collected:  11/06/19 0853    Specimen:  Blood Updated:  11/06/19 1939     Vitamin B-12 677 pg/mL            I reviewed the patient's new clinical results.  I reviewed the patient's new imaging results and agree with the interpretation.     ASSESSMENT/PLAN:   ASSESSMENT: Iron deficiency anemia, improving with blood transfusion.  Continue iron infusion.    PLAN: Continue iron supplements, follow H&H closely, and capsule endoscopy as outpatient   okay to DC from GI standpoint  Follow-up in clinic in 2 weeks  The risks, benefits, and alternatives of this procedure have been discussed with the patient or the responsible party- the patient understands and agrees to proceed.         Judith Conti MD  11/07/19  2:36 PM

## 2019-11-08 ENCOUNTER — READMISSION MANAGEMENT (OUTPATIENT)
Dept: CALL CENTER | Facility: HOSPITAL | Age: 79
End: 2019-11-08

## 2019-11-08 ENCOUNTER — NURSE TRIAGE (OUTPATIENT)
Dept: CALL CENTER | Facility: HOSPITAL | Age: 79
End: 2019-11-08

## 2019-11-08 LAB
LAB AP CASE REPORT: NORMAL
PATH REPORT.FINAL DX SPEC: NORMAL
PATH REPORT.GROSS SPEC: NORMAL

## 2019-11-08 NOTE — TELEPHONE ENCOUNTER
"Patient has had two doses of steroid with protonix and taken with meals and is tolerating without difficulty. Advised caller to give medication as directed and if develops any problems let Dr. Deni Garcia know. Advised be sure to give WITH FOOD and to give protonix to protect his stomach. He also had recent blood loss and had to get transfusion and is on iron supplement.     Reason for Disposition  • Caller has medication question, adult has minor symptoms, caller declines triage, and triager answers question    Additional Information  • Negative: Drug overdose and nurse unable to answer question  • Negative: Caller requesting information not related to medicine  • Negative: Caller requesting a prescription for Strep throat and has a positive culture result  • Negative: Rash while taking a medication or within 3 days of stopping it  • Negative: Immunization reaction suspected  • Negative: [1] Asthma and [2] having symptoms of asthma (cough, wheezing, etc)  • Negative: MORE THAN A DOUBLE DOSE of a prescription or over-the-counter (OTC) drug  • Negative: [1] DOUBLE DOSE (an extra dose or lesser amount) of over-the-counter (OTC) drug AND [2] any symptoms (e.g., dizziness, nausea, pain, sleepiness)  • Negative: [1] DOUBLE DOSE (an extra dose or lesser amount) of prescription drug AND [2] any symptoms (e.g., dizziness, nausea, pain, sleepiness)  • Negative: Took another person's prescription drug  • Negative: [1] DOUBLE DOSE (an extra dose or lesser amount) of prescription drug AND [2] NO symptoms (Exception: a double dose of antibiotics)  • Negative: Diabetes drug error or overdose (e.g., insulin or extra dose)  • Negative: [1] Request for URGENT new prescription or refill of \"essential\" medication (i.e., likelihood of harm to patient if not taken) AND [2] triager unable to fill per unit policy  • Negative: [1] Prescription not at pharmacy AND [2] was prescribed today by PCP  • Negative: Pharmacy calling with " "prescription questions and triager unable to answer question  • Negative: Caller has URGENT medication question about med that PCP prescribed and triager unable to answer question  • Negative: Caller has NON-URGENT medication question about med that PCP prescribed and triager unable to answer question  • Negative: Caller requesting a NON-URGENT new prescription or refill and triager unable to refill per unit policy  • Negative: Caller has medication question about med not prescribed by PCP and triager unable to answer question (e.g., compatibility with other med, storage)  • Negative: [1] DOUBLE DOSE (an extra dose or lesser amount) of over-the-counter (OTC) drug AND [2] NO symptoms  • Negative: [1] DOUBLE DOSE (an extra dose or lesser amount) of antibiotic drug AND [2] NO symptoms  • Negative: Caller has medication question only, adult not sick, and triager answers question    Answer Assessment - Initial Assessment Questions  1. SYMPTOMS: \"Do you have any symptoms?\"      COPD treatment with steroid and bronchodilator, also had treatment for anemia, blood loss due to esophagitis, diverticular disease and hemorrhoids. Blood transfusion given and started on iron supplement.  2. SEVERITY: If symptoms are present, ask \"Are they mild, moderate or severe?\"      O2 sat in mid 90's prior to discharge    Protocols used: MEDICATION QUESTION CALL-ADULT-      "

## 2019-11-08 NOTE — OUTREACH NOTE
Prep Survey      Responses   Facility patient discharged from?  [facility]   Is patient eligible?  Yes   Discharge diagnosis  symptomatic anemia   Does the patient have one of the following disease processes/diagnoses(primary or secondary)?  Other   Does the patient have Home health ordered?  No   Is there a DME ordered?  No   Comments regarding appointments  see AVS   Medication alerts for this patient  protonix, deltasone, ferrous sulfate   Prep survey completed?  Yes          Saima Gonzlaez RN

## 2019-11-12 ENCOUNTER — READMISSION MANAGEMENT (OUTPATIENT)
Dept: CALL CENTER | Facility: HOSPITAL | Age: 79
End: 2019-11-12

## 2019-11-12 NOTE — OUTREACH NOTE
Medical Week 1 Survey      Responses   Facility patient discharged from?  [facility]   Does the patient have one of the following disease processes/diagnoses(primary or secondary)?  Other   Is there a successful TCM telephone encounter documented?  No   Week 1 attempt successful?  Yes   Call start time  0957   Call end time  1008   Discharge diagnosis  symptomatic anemia   Is patient permission given to speak with other caregiver?  Yes   Person spoke with today (if not patient) and relationship  wife/ Jovana   Medication alerts for this patient  protonix, deltasone, ferrous sulfate   Meds reviewed with patient/caregiver?  Yes   Is the patient having any side effects they believe may be caused by any medication additions or changes?  No   Does the patient have all medications ordered at discharge?  Yes   Is the patient taking all medications as directed (includes completed medication regime)?  Yes   Comments regarding appointments  see AVS   Does the patient have a primary care provider?   Yes   Does the patient have an appointment with their PCP within 7 days of discharge?  Yes   Comments regarding PCP  Dr Garcia/ PCP    Has the patient kept scheduled appointments due by today?  N/A   Comments  Upcoming pill cam test.    Has home health visited the patient within 72 hours of discharge?  N/A   Psychosocial issues?  No   Did the patient receive a copy of their discharge instructions?  Yes   Nursing interventions  Reviewed instructions with patient   What is the patient's perception of their health status since discharge?  Improving   Is the patient/caregiver able to teach back signs and symptoms related to disease process for when to call PCP?  Yes   Is the patient/caregiver able to teach back signs and symptoms related to disease process for when to call 911?  Yes   Is the patient/caregiver able to teach back the hierarchy of who to call/visit for symptoms/problems? PCP, Specialist, Home health nurse, Urgent Care,  ED, 911  Yes   Week 1 call completed?  Yes          Levar Farias RN

## 2019-11-18 ENCOUNTER — HOSPITAL ENCOUNTER (OUTPATIENT)
Dept: GASTROENTEROLOGY | Facility: HOSPITAL | Age: 79
Discharge: HOME | End: 2019-11-18
Admitting: INTERNAL MEDICINE

## 2019-11-18 ENCOUNTER — OFFICE VISIT (OUTPATIENT)
Dept: FAMILY MEDICINE CLINIC | Facility: CLINIC | Age: 79
End: 2019-11-18

## 2019-11-18 ENCOUNTER — APPOINTMENT (OUTPATIENT)
Dept: LAB | Facility: HOSPITAL | Age: 79
End: 2019-11-18

## 2019-11-18 ENCOUNTER — OFFICE VISIT (OUTPATIENT)
Dept: GASTROENTEROLOGY | Facility: CLINIC | Age: 79
End: 2019-11-18

## 2019-11-18 VITALS
SYSTOLIC BLOOD PRESSURE: 146 MMHG | WEIGHT: 179 LBS | DIASTOLIC BLOOD PRESSURE: 80 MMHG | HEIGHT: 69 IN | BODY MASS INDEX: 26.51 KG/M2

## 2019-11-18 DIAGNOSIS — D64.9 SYMPTOMATIC ANEMIA: Chronic | ICD-10-CM

## 2019-11-18 DIAGNOSIS — N18.2 CHRONIC RENAL INSUFFICIENCY, STAGE 2 (MILD): Chronic | ICD-10-CM

## 2019-11-18 DIAGNOSIS — J41.8 MIXED SIMPLE AND MUCOPURULENT CHRONIC BRONCHITIS (HCC): Chronic | ICD-10-CM

## 2019-11-18 DIAGNOSIS — D50.0 IRON DEFICIENCY ANEMIA DUE TO CHRONIC BLOOD LOSS: Primary | ICD-10-CM

## 2019-11-18 DIAGNOSIS — K92.2 GASTROINTESTINAL HEMORRHAGE, UNSPECIFIED GASTROINTESTINAL HEMORRHAGE TYPE: ICD-10-CM

## 2019-11-18 DIAGNOSIS — D64.9 SYMPTOMATIC ANEMIA: Primary | ICD-10-CM

## 2019-11-18 PROBLEM — R01.1 CARDIAC MURMUR: Chronic | Status: ACTIVE | Noted: 2018-11-05

## 2019-11-18 LAB
HCT VFR BLD AUTO: 31.6 % (ref 37.5–51)
HGB BLD-MCNC: 10.2 G/DL (ref 13–17.7)

## 2019-11-18 PROCEDURE — 85014 HEMATOCRIT: CPT | Performed by: FAMILY MEDICINE

## 2019-11-18 PROCEDURE — 85018 HEMOGLOBIN: CPT | Performed by: FAMILY MEDICINE

## 2019-11-18 PROCEDURE — 99212 OFFICE O/P EST SF 10 MIN: CPT | Performed by: INTERNAL MEDICINE

## 2019-11-18 PROCEDURE — 99214 OFFICE O/P EST MOD 30 MIN: CPT | Performed by: FAMILY MEDICINE

## 2019-11-18 PROCEDURE — 91110 GI TRC IMG INTRAL ESOPH-ILE: CPT

## 2019-11-18 PROCEDURE — 36415 COLL VENOUS BLD VENIPUNCTURE: CPT | Performed by: FAMILY MEDICINE

## 2019-11-18 RX ORDER — PANTOPRAZOLE SODIUM 40 MG/1
40 TABLET, DELAYED RELEASE ORAL
Qty: 60 TABLET | Refills: 1 | Status: SHIPPED | OUTPATIENT
Start: 2019-11-18 | End: 2020-06-22

## 2019-11-18 RX ORDER — FERROUS SULFATE TAB EC 324 MG (65 MG FE EQUIVALENT) 324 (65 FE) MG
324 TABLET DELAYED RESPONSE ORAL 2 TIMES DAILY WITH MEALS
Qty: 180 TABLET | Refills: 1 | Status: SHIPPED | OUTPATIENT
Start: 2019-11-18 | End: 2019-12-18 | Stop reason: SINTOL

## 2019-11-18 NOTE — NURSING NOTE
Room arrival time______0825________________________    Does patient have a pacemaker or implantable device (if yes, this is contraindicated) :       B/P:143/61    Heart Rate:64    O2 Sat %:97    Temp:  97.8    Pain:0            If yes, location and VAS _____________________________    Allergies:see chart  NPO status:11/17/2019 at 1800  Pillcam Lot# :[id number]  Pillcam expiration date:  2021-01-24  Patient swallowed capsule @ _______0830______      Patient arrived to Endoscopy department ambulatory, alert and oriented.  Procedure explained to patient, patient verbalized understanding.  Consent obtained.  Patient swallowed capsule without difficulty.  Dietary instructions given and patient instructed on time to return to the Endoscopy department.    Discharged from endo time_________________________    NOTE: pt approved to return unit tomorrow

## 2019-11-18 NOTE — PROGRESS NOTES
Subjective   Xander Chnadan Wallace is a 79 y.o. male.     History of Present Illness   follow-up hospitalization anemia is presumptive GI bleed possible COPD flare atrial fib etc.  In the interim has been home was treated for COPD flare while in hospital that has improved placed on iron for his anemia as well as blood.  Has had a GI PillCam done today.  Upper and lower diascopy showed no gross lesion just some generalized inflammation.  Currently is on proton pump blocker for same.  Overall feels improved from prehospitalization.  Continues use walker routinely overall is stable  HPI    The following portions of the patient's history were reviewed and updated as appropriate: allergies, current medications, past family history, past medical history, past social history, past surgical history and problem list.    Review of Systems  Review of Systems   Constitutional: Positive for fatigue (Improved). Negative for activity change, appetite change and unexpected weight change.   HENT: Negative for trouble swallowing and voice change.    Eyes: Negative for redness and visual disturbance.   Respiratory: Positive for shortness of breath (Chronic COPD related). Negative for cough and wheezing.    Cardiovascular: Negative for chest pain and palpitations.   Gastrointestinal: Negative for abdominal pain, constipation, diarrhea, nausea and vomiting.   Genitourinary: Negative for urgency.   Musculoskeletal: Negative for joint swelling.   Neurological: Negative for syncope and headaches.   Hematological: Negative for adenopathy.   Psychiatric/Behavioral: Positive for decreased concentration (Multifactorial). Negative for sleep disturbance.       Objective   Physical Exam  Physical Exam   Constitutional: He is oriented to person, place, and time. He appears well-developed.   HENT:   Head: Normocephalic.   Nose: Nose normal.   Eyes: Pupils are equal, round, and reactive to light.   Neck: Normal range of motion. No thyromegaly  "present.   Cardiovascular: Normal rate, regular rhythm, normal heart sounds and intact distal pulses. Exam reveals no gallop and no friction rub.   No murmur heard.  Pulmonary/Chest: He has wheezes (Mild wheeze rhonchi bases expiratory normal rate increased AP diameter).   Abdominal: Soft. He exhibits no distension and no mass. There is no tenderness.   Musculoskeletal: Normal range of motion.   Neurological: He is alert and oriented to person, place, and time. He has normal reflexes.   Skin: Skin is warm and dry.   Psychiatric: His affect is blunt. His speech is delayed. He is slowed.         Visit Vitals  /80   Ht 175.3 cm (69\")   Wt 81.2 kg (179 lb)   BMI 26.43 kg/m²     Body mass index is 26.43 kg/m².      Assessment/Plan   Xander was seen today for follow-up.    Diagnoses and all orders for this visit:    Symptomatic anemia  -     Hemoglobin & Hematocrit, Blood  -     ferrous sulfate 324 (65 Fe) MG tablet delayed-release EC tablet; Take 1 tablet by mouth 2 (Two) Times a Day With Meals.    Gastrointestinal hemorrhage, unspecified gastrointestinal hemorrhage type  -     ferrous sulfate 324 (65 Fe) MG tablet delayed-release EC tablet; Take 1 tablet by mouth 2 (Two) Times a Day With Meals.  -     pantoprazole (PROTONIX) 40 MG EC tablet; Take 1 tablet by mouth 2 (Two) Times a Day Before Meals.    Chronic renal insufficiency, stage 2 (mild)    Mixed simple and mucopurulent chronic bronchitis (CMS/HCC)    Continue proton pump blocker iron for now repeat H&H today.  Defer to cardiology in GI for the next several weeks counseled mainly on lifestyle measures recheck 3 months  Current outpatient and discharge medications have been reconciled for the patient.  Reviewed by: Deni Garcia MD    "

## 2019-11-18 NOTE — PROGRESS NOTES
No chief complaint on file.      Urbano Wallace is a 79 y.o. male.    History of Present Illness    Presented to the GI clinic for follow-up visit today.  Feels well currently.  No GI complaints at this time.  Has PillCam in progress.  EGD and colonoscopy as inpatient was consistent with fair prep, adenomatous colon polyps, diverticulosis, hemorrhoids, congested mucosa in the ascending colon, esophagitis and gastritis.  Path was unremarkable.  Most recent hemoglobin was 9.7.  H&H was drawn today and results are pending at this time.     The following portions of the patient's history were reviewed and updated as appropriate:   Past Medical History:   Diagnosis Date   • Aortic stenosis    • Asthma    • COPD (chronic obstructive pulmonary disease) (CMS/Formerly [facility])    • Coronary artery disease involving coronary bypass graft     CABG 2012, stent 2016, stent 2005   • Emphysema of lung (CMS/Formerly [facility])    • Hyperlipidemia    • Hypertension    • PVD (peripheral vascular disease) (CMS/HCC)    • Seizure disorder (CMS/HCC)    • Skin cancer of lip    • Sleep apnea      Past Surgical History:   Procedure Laterality Date   • APPENDECTOMY     • CARDIAC SURGERY      CABG 2012   • CAROTID STENT     • COLON SURGERY     • COLONOSCOPY N/A 11/6/2019    Procedure: COLONOSCOPY;  Surgeon: Judith Conti MD;  Location: [facility] ENDOSCOPY;  Service: Gastroenterology   • CORONARY ARTERY BYPASS GRAFT     • ENDOSCOPY N/A 11/6/2019    Procedure: ESOPHAGOGASTRODUODENOSCOPY;  Surgeon: Judith Conti MD;  Location: [facility] ENDOSCOPY;  Service: Gastroenterology   • HEMORRHOIDECTOMY     • HERNIA REPAIR       Family History   Problem Relation Age of Onset   • Cancer Father        Prior to Admission medications    Medication Sig Start Date End Date Taking? Authorizing Provider   albuterol (PROVENTIL HFA;VENTOLIN HFA) 108 (90 Base) MCG/ACT inhaler Inhale 2 puffs Every 4 (Four) Hours As Needed for Wheezing. 11/5/18   Deni Garcia MD   B  Complex-Biotin-FA (SUPER B-COMPLEX) tablet Take  by mouth.    Zeynep Shrestha MD   budesonide-formoterol (SYMBICORT) 160-4.5 MCG/ACT inhaler Inhale 2 puffs 2 (Two) Times a Day.    Zeynep Shrestha MD   clopidogrel (PLAVIX) 75 MG tablet Take 1 tablet by mouth Daily. 9/4/19   Mike Montes MD   diltiaZEM CD (CARDIZEM CD) 180 MG 24 hr capsule Take 1 capsule by mouth Daily. 9/4/19   Mike Montes MD   ferrous sulfate 324 (65 Fe) MG tablet delayed-release EC tablet Take 1 tablet by mouth 2 (Two) Times a Day With Meals. 11/18/19   Deni Garcia MD   gabapentin (NEURONTIN) 600 MG tablet Take 600 mg by mouth 3 (three) times a day.    Zeynep Shrestha MD   lamoTRIgine (LaMICtal) 200 MG tablet Take 200 mg by mouth daily.    Zeynep Shrestha MD   lisinopril (PRINIVIL,ZESTRIL) 20 MG tablet Take 1 tablet by mouth Daily. 9/4/19   Mike Montes MD   mupirocin (BACTROBAN) 2 % cream Apply  topically to the appropriate area as directed 3 (Three) Times a Day. 3/28/19   Deni Garcia MD   nitroglycerin (NITROSTAT) 0.4 MG SL tablet 1 under the tongue as needed for angina, may repeat q5mins for up three doses 11/26/18   Branden Corral MD   Omega-3 Fatty Acids (FISH OIL) 1000 MG capsule capsule Take  by mouth daily with breakfast.    Zeynep Shrestha MD   pantoprazole (PROTONIX) 40 MG EC tablet Take 1 tablet by mouth 2 (Two) Times a Day Before Meals. 11/18/19   Deni Garcia MD   rosuvastatin (CRESTOR) 20 MG tablet Take 1 tablet by mouth Daily. 9/4/19   Mike Montes MD   tamsulosin (FLOMAX) 0.4 MG capsule 24 hr capsule Take 1 capsule by mouth Every Night. 1/29/18   Leidy Stanford DO   triamcinolone (KENALOG) 0.5 % cream  10/29/18   Zeynep Shrestha MD   ferrous sulfate 324 (65 Fe) MG tablet delayed-release EC tablet Take 1 tablet by mouth 2 (Two) Times a Day With Meals. 11/7/19 11/18/19  Ryan Tatum MD   pantoprazole (PROTONIX) 40 MG EC tablet Take 1 tablet by mouth 2 (Two) Times a  Day Before Meals. 11/7/19 11/18/19  Ryan Tatum MD     Allergies   Allergen Reactions   • Doxycycline    • Amoxicillin    • Ceftin [Cefuroxime]    • Methylphenidate Derivatives    • Nystatin    • Sulfur    • Theophyllines Other (See Comments)     Not specified        Social History     Socioeconomic History   • Marital status:      Spouse name: Not on file   • Number of children: Not on file   • Years of education: Not on file   • Highest education level: Not on file   Tobacco Use   • Smoking status: Former Smoker   • Smokeless tobacco: Never Used   Substance and Sexual Activity   • Alcohol use: No   • Drug use: No   • Sexual activity: Defer       Review of Systems  Review of Systems   Constitutional: Negative for chills, fatigue, fever and unexpected weight change.   HENT: Negative for congestion, ear discharge, hearing loss, nosebleeds and sore throat.    Eyes: Negative for pain, discharge and redness.   Respiratory: Negative for cough, chest tightness, shortness of breath and wheezing.    Cardiovascular: Negative for chest pain and palpitations.   Gastrointestinal: Negative for abdominal distention, abdominal pain, blood in stool, constipation, diarrhea, nausea and vomiting.   Endocrine: Negative for cold intolerance, polydipsia, polyphagia and polyuria.   Genitourinary: Negative for dysuria, flank pain, frequency, hematuria and urgency.   Musculoskeletal: Negative for arthralgias, back pain, joint swelling and myalgias.   Skin: Negative for color change, pallor and rash.   Neurological: Negative for tremors, seizures, syncope, weakness and headaches.   Hematological: Negative for adenopathy. Does not bruise/bleed easily.   Psychiatric/Behavioral: Negative for behavioral problems, confusion, dysphoric mood, hallucinations and suicidal ideas. The patient is not nervous/anxious.         There were no vitals taken for this visit.    Objective    Physical Exam   Constitutional: He is oriented to  person, place, and time. He appears well-developed and well-nourished.   HENT:   Head: Normocephalic and atraumatic.   Mouth/Throat: Oropharynx is clear and moist.   Eyes: Conjunctivae and EOM are normal. Pupils are equal, round, and reactive to light.   Neck: Normal range of motion. Neck supple. No thyromegaly present.   Cardiovascular: Normal rate, regular rhythm and normal heart sounds.   No murmur heard.  Pulmonary/Chest: Effort normal and breath sounds normal. He has no wheezes.   Abdominal: Soft. Bowel sounds are normal. He exhibits no distension and no mass. There is no tenderness. No hernia.   Genitourinary:   Genitourinary Comments: No lesions noted   Musculoskeletal: Normal range of motion. He exhibits no edema or tenderness.   Lymphadenopathy:     He has no cervical adenopathy.   Neurological: He is alert and oriented to person, place, and time. No cranial nerve deficit.   Skin: Skin is warm and dry. No rash noted.   Psychiatric: He has a normal mood and affect. Thought content normal.     Admission on 11/05/2019, Discharged on 11/07/2019   Component Date Value Ref Range Status   • WBC 11/05/2019 4.78  3.40 - 10.80 10*3/mm3 Final   • RBC 11/05/2019 2.27* 4.14 - 5.80 10*6/mm3 Final   • Hemoglobin 11/05/2019 6.2* 13.0 - 17.7 g/dL Final   • Hematocrit 11/05/2019 19.5* 37.5 - 51.0 % Final   • MCV 11/05/2019 85.9  79.0 - 97.0 fL Final   • MCH 11/05/2019 27.3  26.6 - 33.0 pg Final   • MCHC 11/05/2019 31.8  31.5 - 35.7 g/dL Final   • RDW 11/05/2019 13.2  12.3 - 15.4 % Final   • RDW-SD 11/05/2019 41.6  37.0 - 54.0 fl Final   • MPV 11/05/2019 9.3  6.0 - 12.0 fL Final   • Platelets 11/05/2019 188  140 - 450 10*3/mm3 Final   • Glucose 11/05/2019 97  65 - 99 mg/dL Final   • BUN 11/05/2019 19  8 - 23 mg/dL Final   • Creatinine 11/05/2019 1.39* 0.76 - 1.27 mg/dL Final   • Sodium 11/05/2019 139  136 - 145 mmol/L Final   • Potassium 11/05/2019 4.3  3.5 - 5.2 mmol/L Final   • Chloride 11/05/2019 105  98 - 107 mmol/L Final    • CO2 11/05/2019 25.0  22.0 - 29.0 mmol/L Final   • Calcium 11/05/2019 8.5* 8.6 - 10.5 mg/dL Final   • Total Protein 11/05/2019 6.2  6.0 - 8.5 g/dL Final   • Albumin 11/05/2019 3.80  3.50 - 5.20 g/dL Final   • ALT (SGPT) 11/05/2019 13  1 - 41 U/L Final   • AST (SGOT) 11/05/2019 16  1 - 40 U/L Final   • Alkaline Phosphatase 11/05/2019 82  39 - 117 U/L Final   • Total Bilirubin 11/05/2019 <0.2* 0.2 - 1.2 mg/dL Final   • eGFR Non African Amer 11/05/2019 49* >60 mL/min/1.73 Final   • Globulin 11/05/2019 2.4  gm/dL Final   • A/G Ratio 11/05/2019 1.6  g/dL Final   • BUN/Creatinine Ratio 11/05/2019 13.7  7.0 - 25.0 Final   • Anion Gap 11/05/2019 9.0  5.0 - 15.0 mmol/L Final   • Protime 11/05/2019 13.3  11.1 - 15.3 Seconds Final   • INR 11/05/2019 1.03  0.80 - 1.20 Final   • ABO Type 11/05/2019 O   Final   • RH type 11/05/2019 Positive   Final   • Antibody Screen 11/05/2019 Negative   Final   • T&S Expiration Date 11/05/2019 11/8/2019 11:59:59 PM   Final   • Free T4 11/05/2019 1.00  0.93 - 1.70 ng/dL Final   • TSH 11/05/2019 1.610  0.270 - 4.200 uIU/mL Final   • Phosphorus 11/05/2019 2.7  2.5 - 4.5 mg/dL Final   • Magnesium 11/05/2019 2.1  1.6 - 2.4 mg/dL Final   • Iron 11/05/2019 16* 59 - 158 mcg/dL Final   • Iron Saturation 11/05/2019 4* 20 - 50 % Final   • Transferrin 11/05/2019 270  200 - 360 mg/dL Final   • TIBC 11/05/2019 402  298 - 536 mcg/dL Final   • Vitamin B-12 11/05/2019 530  211 - 946 pg/mL Final   • Folate 11/05/2019 >20.00  4.78 - 24.20 ng/mL Final   • Previous History 11/05/2019 Previous Record on File   Final   • Product Code 11/07/2019 D3818F77   Final   • Unit Number 11/07/2019 [id number]   Final   • UNIT  ABO 11/07/2019 O   Final   • UNIT  RH 11/07/2019 POS   Final   • Dispense Status 11/07/2019 PT   Final   • Blood Type 11/07/2019 OPOS   Final   • Blood Expiration Date 11/07/2019 201911192359   Final   • Blood Type Barcode 11/07/2019 5100   Final   • Product Code 11/07/2019 O3320B58   Final   •  Unit Number 11/07/2019 [id number]   Final   • UNIT  ABO 11/07/2019 O   Final   • UNIT  RH 11/07/2019 POS   Final   • Dispense Status 11/07/2019 PT   Final   • Blood Type 11/07/2019 OPOS   Final   • Blood Expiration Date 11/07/2019 201911192359   Final   • Blood Type Barcode 11/07/2019 5100   Final   • Transfusion Reaction Interp 1 11/05/2019 See Transfusion Reaction Report   Final   • Product Code 11/07/2019 E6656F63   Final   • Unit Number 11/07/2019 [id number]   Final   • UNIT  ABO 11/07/2019 O   Final   • UNIT  RH 11/07/2019 POS   Final   • Dispense Status 11/07/2019 PT   Final   • Blood Type 11/07/2019 OPOS   Final   • Blood Expiration Date 11/07/2019 201911192359   Final   • Blood Type Barcode 11/07/2019 5100   Final   • Glucose 11/06/2019 145* 65 - 99 mg/dL Final   • BUN 11/06/2019 15  8 - 23 mg/dL Final   • Creatinine 11/06/2019 1.20  0.76 - 1.27 mg/dL Final   • Sodium 11/06/2019 139  136 - 145 mmol/L Final   • Potassium 11/06/2019 4.1  3.5 - 5.2 mmol/L Final   • Chloride 11/06/2019 104  98 - 107 mmol/L Final   • CO2 11/06/2019 24.0  22.0 - 29.0 mmol/L Final   • Calcium 11/06/2019 8.7  8.6 - 10.5 mg/dL Final   • eGFR Non African Amer 11/06/2019 58* >60 mL/min/1.73 Final   • BUN/Creatinine Ratio 11/06/2019 12.5  7.0 - 25.0 Final   • Anion Gap 11/06/2019 11.0  5.0 - 15.0 mmol/L Final   • Vitamin B-12 11/06/2019 677  211 - 946 pg/mL Final   • WBC 11/06/2019 4.18  3.40 - 10.80 10*3/mm3 Final   • RBC 11/06/2019 3.40* 4.14 - 5.80 10*6/mm3 Final   • Hemoglobin 11/06/2019 9.6* 13.0 - 17.7 g/dL Final   • Hematocrit 11/06/2019 29.1* 37.5 - 51.0 % Final   • MCV 11/06/2019 85.6  79.0 - 97.0 fL Final   • MCH 11/06/2019 28.2  26.6 - 33.0 pg Final   • MCHC 11/06/2019 33.0  31.5 - 35.7 g/dL Final   • RDW 11/06/2019 13.7  12.3 - 15.4 % Final   • RDW-SD 11/06/2019 42.6  37.0 - 54.0 fl Final   • MPV 11/06/2019 9.7  6.0 - 12.0 fL Final   • Platelets 11/06/2019 196  140 - 450 10*3/mm3 Final   • Neutrophil % 11/06/2019  88.8* 42.7 - 76.0 % Final   • Lymphocyte % 11/06/2019 9.1* 19.6 - 45.3 % Final   • Monocyte % 11/06/2019 1.4* 5.0 - 12.0 % Final   • Eosinophil % 11/06/2019 0.0* 0.3 - 6.2 % Final   • Basophil % 11/06/2019 0.0  0.0 - 1.5 % Final   • Immature Grans % 11/06/2019 0.7* 0.0 - 0.5 % Final   • Neutrophils, Absolute 11/06/2019 3.71  1.70 - 7.00 10*3/mm3 Final   • Lymphocytes, Absolute 11/06/2019 0.38* 0.70 - 3.10 10*3/mm3 Final   • Monocytes, Absolute 11/06/2019 0.06* 0.10 - 0.90 10*3/mm3 Final   • Eosinophils, Absolute 11/06/2019 0.00  0.00 - 0.40 10*3/mm3 Final   • Basophils, Absolute 11/06/2019 0.00  0.00 - 0.20 10*3/mm3 Final   • Immature Grans, Absolute 11/06/2019 0.03  0.00 - 0.05 10*3/mm3 Final   • nRBC 11/06/2019 0.0  0.0 - 0.2 /100 WBC Final   • Case Report 11/06/2019    Final                    Value:Surgical Pathology Report                         Case: [id number]                                  Authorizing Provider:  Judith Conti MD      Collected:           11/06/2019 11:03 AM          Ordering Location:     [facility]             Received:            11/07/2019 06:44 AM                                 [facility]                                                     Pathologist:           Mykel Osorio MD                                                        Specimens:   1) - Small Intestine                                                                                2) - Esophagus, eg junction                                                                         3) - Large Intestine, Right / Ascending Colon, cold bx cold snare polyp                             4) - Large Intestine, Sigmoid Colon, cold snare                                           • Final Diagnosis 11/06/2019    Final                    Value:This result contains rich text formatting which cannot be displayed here.   • Gross Description 11/06/2019    Final                    Value:This result contains rich  text formatting which cannot be displayed here.   • Hemoglobin 11/06/2019 9.5* 13.0 - 17.7 g/dL Final   • Hematocrit 11/06/2019 28.0* 37.5 - 51.0 % Final   • WBC 11/07/2019 11.09* 3.40 - 10.80 10*3/mm3 Final   • RBC 11/07/2019 3.43* 4.14 - 5.80 10*6/mm3 Final   • Hemoglobin 11/07/2019 9.7* 13.0 - 17.7 g/dL Final   • Hematocrit 11/07/2019 29.2* 37.5 - 51.0 % Final   • MCV 11/07/2019 85.1  79.0 - 97.0 fL Final   • MCH 11/07/2019 28.3  26.6 - 33.0 pg Final   • MCHC 11/07/2019 33.2  31.5 - 35.7 g/dL Final   • RDW 11/07/2019 13.7  12.3 - 15.4 % Final   • RDW-SD 11/07/2019 42.9  37.0 - 54.0 fl Final   • MPV 11/07/2019 10.0  6.0 - 12.0 fL Final   • Platelets 11/07/2019 209  140 - 450 10*3/mm3 Final   • Neutrophil % 11/07/2019 81.6* 42.7 - 76.0 % Final   • Lymphocyte % 11/07/2019 8.1* 19.6 - 45.3 % Final   • Monocyte % 11/07/2019 9.7  5.0 - 12.0 % Final   • Eosinophil % 11/07/2019 0.1* 0.3 - 6.2 % Final   • Basophil % 11/07/2019 0.1  0.0 - 1.5 % Final   • Immature Grans % 11/07/2019 0.4  0.0 - 0.5 % Final   • Neutrophils, Absolute 11/07/2019 9.05* 1.70 - 7.00 10*3/mm3 Final   • Lymphocytes, Absolute 11/07/2019 0.90  0.70 - 3.10 10*3/mm3 Final   • Monocytes, Absolute 11/07/2019 1.08* 0.10 - 0.90 10*3/mm3 Final   • Eosinophils, Absolute 11/07/2019 0.01  0.00 - 0.40 10*3/mm3 Final   • Basophils, Absolute 11/07/2019 0.01  0.00 - 0.20 10*3/mm3 Final   • Immature Grans, Absolute 11/07/2019 0.04  0.00 - 0.05 10*3/mm3 Final   • nRBC 11/07/2019 0.0  0.0 - 0.2 /100 WBC Final   • Glucose 11/07/2019 93  65 - 99 mg/dL Final   • BUN 11/07/2019 18  8 - 23 mg/dL Final   • Creatinine 11/07/2019 1.08  0.76 - 1.27 mg/dL Final   • Sodium 11/07/2019 142  136 - 145 mmol/L Final   • Potassium 11/07/2019 3.8  3.5 - 5.2 mmol/L Final   • Chloride 11/07/2019 106  98 - 107 mmol/L Final   • CO2 11/07/2019 24.0  22.0 - 29.0 mmol/L Final   • Calcium 11/07/2019 8.7  8.6 - 10.5 mg/dL Final   • eGFR Non African Amer 11/07/2019 66  >60 mL/min/1.73 Final   •  BUN/Creatinine Ratio 11/07/2019 16.7  7.0 - 25.0 Final   • Anion Gap 11/07/2019 12.0  5.0 - 15.0 mmol/L Final   • Vitamin B-12 11/07/2019 541  211 - 946 pg/mL Final     Assessment/Plan    No diagnosis found..     1.  Iron deficiency anemia, likely mild multifactorial.  Chronic GI blood loss associated with renal insufficiency and possible nutritional deficiency.  Continue iron infusions.  Await repeat H&H today.  PillCam is in progress.  2.  Colon polyps, repeat colonoscopy in 3 years.    Orders placed during this encounter include:  No orders of the defined types were placed in this encounter.      * Surgery not found *    Review and/or summary of lab tests, radiology, procedures, medications. Review and summary of old records and obtaining of history. The risks and benefits of my recommendations, as well as other treatment options were discussed with the patient and family member(s) today. Questions were answered.    No orders of the defined types were placed in this encounter.      Follow-up: No Follow-up on file.               Results for orders placed or performed during the hospital encounter of 11/05/19   PREVIOUS HISTORY   Result Value Ref Range    Previous History Previous Record on File    Transfusion Reaction Evaluation   Result Value Ref Range    Transfusion Reaction Interp 1 See Transfusion Reaction Report    CBC Auto Differential   Result Value Ref Range    WBC 11.09 (H) 3.40 - 10.80 10*3/mm3    RBC 3.43 (L) 4.14 - 5.80 10*6/mm3    Hemoglobin 9.7 (L) 13.0 - 17.7 g/dL    Hematocrit 29.2 (L) 37.5 - 51.0 %    MCV 85.1 79.0 - 97.0 fL    MCH 28.3 26.6 - 33.0 pg    MCHC 33.2 31.5 - 35.7 g/dL    RDW 13.7 12.3 - 15.4 %    RDW-SD 42.9 37.0 - 54.0 fl    MPV 10.0 6.0 - 12.0 fL    Platelets 209 140 - 450 10*3/mm3    Neutrophil % 81.6 (H) 42.7 - 76.0 %    Lymphocyte % 8.1 (L) 19.6 - 45.3 %    Monocyte % 9.7 5.0 - 12.0 %    Eosinophil % 0.1 (L) 0.3 - 6.2 %    Basophil % 0.1 0.0 - 1.5 %    Immature Grans % 0.4 0.0 -  0.5 %    Neutrophils, Absolute 9.05 (H) 1.70 - 7.00 10*3/mm3    Lymphocytes, Absolute 0.90 0.70 - 3.10 10*3/mm3    Monocytes, Absolute 1.08 (H) 0.10 - 0.90 10*3/mm3    Eosinophils, Absolute 0.01 0.00 - 0.40 10*3/mm3    Basophils, Absolute 0.01 0.00 - 0.20 10*3/mm3    Immature Grans, Absolute 0.04 0.00 - 0.05 10*3/mm3    nRBC 0.0 0.0 - 0.2 /100 WBC   CBC Auto Differential   Result Value Ref Range    WBC 4.18 3.40 - 10.80 10*3/mm3    RBC 3.40 (L) 4.14 - 5.80 10*6/mm3    Hemoglobin 9.6 (L) 13.0 - 17.7 g/dL    Hematocrit 29.1 (L) 37.5 - 51.0 %    MCV 85.6 79.0 - 97.0 fL    MCH 28.2 26.6 - 33.0 pg    MCHC 33.0 31.5 - 35.7 g/dL    RDW 13.7 12.3 - 15.4 %    RDW-SD 42.6 37.0 - 54.0 fl    MPV 9.7 6.0 - 12.0 fL    Platelets 196 140 - 450 10*3/mm3    Neutrophil % 88.8 (H) 42.7 - 76.0 %    Lymphocyte % 9.1 (L) 19.6 - 45.3 %    Monocyte % 1.4 (L) 5.0 - 12.0 %    Eosinophil % 0.0 (L) 0.3 - 6.2 %    Basophil % 0.0 0.0 - 1.5 %    Immature Grans % 0.7 (H) 0.0 - 0.5 %    Neutrophils, Absolute 3.71 1.70 - 7.00 10*3/mm3    Lymphocytes, Absolute 0.38 (L) 0.70 - 3.10 10*3/mm3    Monocytes, Absolute 0.06 (L) 0.10 - 0.90 10*3/mm3    Eosinophils, Absolute 0.00 0.00 - 0.40 10*3/mm3    Basophils, Absolute 0.00 0.00 - 0.20 10*3/mm3    Immature Grans, Absolute 0.03 0.00 - 0.05 10*3/mm3    nRBC 0.0 0.0 - 0.2 /100 WBC   Iron Profile   Result Value Ref Range    Iron 16 (L) 59 - 158 mcg/dL    Iron Saturation 4 (L) 20 - 50 %    Transferrin 270 200 - 360 mg/dL    TIBC 402 298 - 536 mcg/dL   Hemoglobin & Hematocrit, Blood   Result Value Ref Range    Hemoglobin 9.5 (L) 13.0 - 17.7 g/dL    Hematocrit 28.0 (L) 37.5 - 51.0 %   Tissue Pathology Exam   Result Value Ref Range    Case Report       Surgical Pathology Report                         Case: [id number]                                  Authorizing Provider:  Judith Conti MD      Collected:           11/06/2019 11:03 AM          Ordering Location:     [facility]             Received:             11/07/2019 06:44 AM                                 [facility]                                                     Pathologist:           Mykel Osorio MD                                                        Specimens:   1) - Small Intestine                                                                                2) - Esophagus, eg junction                                                                         3) - Large Intestine, Right / Ascending Colon, cold bx cold snare polyp                             4) - Large Intestine, Sigmoid Colon, cold snare                                            Final Diagnosis       1.  SMALL INTESTINE, MUCOSAL BIOPSY:   MILD CHRONIC INFLAMMATION.     2.  ESOPHAGOGASTRIC JUNCTION, MUCOSAL BIOPSY:   SQUAMOUS AND COLUMNAR LINED MUCOSA WITH MILD CHRONIC INFLAMMATION.   NO EVIDENCE OF GOBLET CELL METAPLASIA.     3.  TUBULAR ADENOMA, ASCENDING COLON.     4.  TUBULAR ADENOMA, SIGMOID COLON.      Gross Description       In 4 containers, each of these show mucosal biopsies measuring up to 0.3 cm in greatest dimension.  Embedded accordingly:  1A small intestine, 2A esophagogastric junction, 3A ascending colon, 4A sigmoid colon.        Protime-INR   Result Value Ref Range    Protime 13.3 11.1 - 15.3 Seconds    INR 1.03 0.80 - 1.20   CBC (No Diff)   Result Value Ref Range    WBC 4.78 3.40 - 10.80 10*3/mm3    RBC 2.27 (L) 4.14 - 5.80 10*6/mm3    Hemoglobin 6.2 (C) 13.0 - 17.7 g/dL    Hematocrit 19.5 (C) 37.5 - 51.0 %    MCV 85.9 79.0 - 97.0 fL    MCH 27.3 26.6 - 33.0 pg    MCHC 31.8 31.5 - 35.7 g/dL    RDW 13.2 12.3 - 15.4 %    RDW-SD 41.6 37.0 - 54.0 fl    MPV 9.3 6.0 - 12.0 fL    Platelets 188 140 - 450 10*3/mm3   Prepare RBC, 1 Units   Result Value Ref Range    Product Code S2071F84     Unit Number [id number]     UNIT  ABO O     UNIT  RH POS     Dispense Status PT     Blood Type OPOS     Blood Expiration Date 921369616776     Blood Type Barcode 5100     Prepare RBC, 2 Units   Result Value Ref Range    Product Code D9093N86     Unit Number [id number]     UNIT  ABO O     UNIT  RH POS     Dispense Status PT     Blood Type OPOS     Blood Expiration Date 201911192359     Blood Type Barcode 5100     Product Code Q0270K63     Unit Number [id number]     UNIT  ABO O     UNIT  RH POS     Dispense Status PT     Blood Type OPOS     Blood Expiration Date 201911192359     Blood Type Barcode 5100    Type & Screen   Result Value Ref Range    ABO Type O     RH type Positive     Antibody Screen Negative     T&S Expiration Date 11/8/2019 11:59:59 PM      *Note: Due to a large number of results and/or encounters for the requested time period, some results have not been displayed. A complete set of results can be found in Results Review.         This document has been electronically signed by Judith Conti MD on November 18, 2019 2:58 PM

## 2019-11-19 ENCOUNTER — READMISSION MANAGEMENT (OUTPATIENT)
Dept: CALL CENTER | Facility: HOSPITAL | Age: 79
End: 2019-11-19

## 2019-11-19 NOTE — OUTREACH NOTE
Medical Week 2 Survey      Responses   Facility patient discharged from?  [place]   Does the patient have one of the following disease processes/diagnoses(primary or secondary)?  Other   Week 2 attempt successful?  No   Unsuccessful attempts  Attempt 1          Dea Echavarria RN

## 2019-11-20 ENCOUNTER — READMISSION MANAGEMENT (OUTPATIENT)
Dept: CALL CENTER | Facility: HOSPITAL | Age: 79
End: 2019-11-20

## 2019-11-20 NOTE — OUTREACH NOTE
Medical Week 2 Survey      Responses   Facility patient discharged from?  [place]   Does the patient have one of the following disease processes/diagnoses(primary or secondary)?  Other   Week 2 attempt successful?  Yes   Call start time  1125   Discharge diagnosis  symptomatic anemia   Call end time  1127   Is patient permission given to speak with other caregiver?  Yes   Person spoke with today (if not patient) and relationship  wife/ Jovana Torress reviewed with patient/caregiver?  Yes   Is the patient having any side effects they believe may be caused by any medication additions or changes?  No   Does the patient have all medications ordered at discharge?  Yes   Is the patient taking all medications as directed (includes completed medication regime)?  Yes   Does the patient have a primary care provider?   Yes   Does the patient have an appointment with their PCP within 7 days of discharge?  Yes   Has the patient kept scheduled appointments due by today?  Yes   Comments  Had pill cam test   Has home health visited the patient within 72 hours of discharge?  N/A   Psychosocial issues?  No   Did the patient receive a copy of their discharge instructions?  Yes   Nursing interventions  Reviewed instructions with patient   What is the patient's perception of their health status since discharge?  Improving   Is the patient/caregiver able to teach back signs and symptoms related to disease process for when to call PCP?  Yes   Is the patient/caregiver able to teach back signs and symptoms related to disease process for when to call 911?  Yes   Is the patient/caregiver able to teach back the hierarchy of who to call/visit for symptoms/problems? PCP, Specialist, Home health nurse, Urgent Care, ED, 911  Yes   Week 2 Call Completed?  Yes          Levar Farias RN

## 2019-11-27 ENCOUNTER — READMISSION MANAGEMENT (OUTPATIENT)
Dept: CALL CENTER | Facility: HOSPITAL | Age: 79
End: 2019-11-27

## 2019-11-27 NOTE — OUTREACH NOTE
Medical Week 3 Survey      Responses   Facility patient discharged from?  [facility]   Does the patient have one of the following disease processes/diagnoses(primary or secondary)?  Other   Week 3 attempt successful?  Yes   Call start time  1311   Rescheduled  Rescheduled-pt requested   Discharge diagnosis  symptomatic anemia          Kinga Garcia, RN

## 2019-12-02 ENCOUNTER — READMISSION MANAGEMENT (OUTPATIENT)
Dept: CALL CENTER | Facility: HOSPITAL | Age: 79
End: 2019-12-02

## 2019-12-02 NOTE — OUTREACH NOTE
Medical Week 3 Survey      Responses   Facility patient discharged from?  [place]   Does the patient have one of the following disease processes/diagnoses(primary or secondary)?  Other   Week 3 attempt successful?  No   Rescheduled  Revoked          Kinga Briones RN

## 2019-12-03 ENCOUNTER — EPISODE CHANGES (OUTPATIENT)
Dept: CASE MANAGEMENT | Facility: OTHER | Age: 79
End: 2019-12-03

## 2019-12-18 ENCOUNTER — OFFICE VISIT (OUTPATIENT)
Dept: GASTROENTEROLOGY | Facility: CLINIC | Age: 79
End: 2019-12-18

## 2019-12-18 ENCOUNTER — APPOINTMENT (OUTPATIENT)
Dept: LAB | Facility: HOSPITAL | Age: 79
End: 2019-12-18

## 2019-12-18 VITALS
HEART RATE: 68 BPM | WEIGHT: 178.6 LBS | DIASTOLIC BLOOD PRESSURE: 53 MMHG | SYSTOLIC BLOOD PRESSURE: 138 MMHG | HEIGHT: 69 IN | BODY MASS INDEX: 26.45 KG/M2

## 2019-12-18 DIAGNOSIS — D50.0 IRON DEFICIENCY ANEMIA DUE TO CHRONIC BLOOD LOSS: Primary | ICD-10-CM

## 2019-12-18 LAB
DEPRECATED RDW RBC AUTO: 45.6 FL (ref 37–54)
ERYTHROCYTE [DISTWIDTH] IN BLOOD BY AUTOMATED COUNT: 14.4 % (ref 12.3–15.4)
HCT VFR BLD AUTO: 32.9 % (ref 37.5–51)
HGB BLD-MCNC: 11 G/DL (ref 13–17.7)
MCH RBC QN AUTO: 29.2 PG (ref 26.6–33)
MCHC RBC AUTO-ENTMCNC: 33.4 G/DL (ref 31.5–35.7)
MCV RBC AUTO: 87.3 FL (ref 79–97)
PLATELET # BLD AUTO: 230 10*3/MM3 (ref 140–450)
PMV BLD AUTO: 10.1 FL (ref 6–12)
RBC # BLD AUTO: 3.77 10*6/MM3 (ref 4.14–5.8)
WBC NRBC COR # BLD: 5.86 10*3/MM3 (ref 3.4–10.8)

## 2019-12-18 PROCEDURE — 36415 COLL VENOUS BLD VENIPUNCTURE: CPT | Performed by: INTERNAL MEDICINE

## 2019-12-18 PROCEDURE — 85027 COMPLETE CBC AUTOMATED: CPT | Performed by: INTERNAL MEDICINE

## 2019-12-18 PROCEDURE — 99212 OFFICE O/P EST SF 10 MIN: CPT | Performed by: INTERNAL MEDICINE

## 2019-12-18 NOTE — PATIENT INSTRUCTIONS
Anemia    Anemia is a condition in which you do not have enough red blood cells or hemoglobin. Hemoglobin is a substance in red blood cells that carries oxygen. When you do not have enough red blood cells or hemoglobin (are anemic), your body cannot get enough oxygen and your organs may not work properly. As a result, you may feel very tired or have other problems.  What are the causes?  Common causes of anemia include:  · Excessive bleeding. Anemia can be caused by excessive bleeding inside or outside the body, including bleeding from the intestine or from periods in women.  · Poor nutrition.  · Long-lasting (chronic) kidney, thyroid, and liver disease.  · Bone marrow disorders.  · Cancer and treatments for cancer.  · HIV (human immunodeficiency virus) and AIDS (acquired immunodeficiency syndrome).  · Treatments for HIV and AIDS.  · Spleen problems.  · Blood disorders.  · Infections, medicines, and autoimmune disorders that destroy red blood cells.  What are the signs or symptoms?  Symptoms of this condition include:  · Minor weakness.  · Dizziness.  · Headache.  · Feeling heartbeats that are irregular or faster than normal (palpitations).  · Shortness of breath, especially with exercise.  · Paleness.  · Cold sensitivity.  · Indigestion.  · Nausea.  · Difficulty sleeping.  · Difficulty concentrating.  Symptoms may occur suddenly or develop slowly. If your anemia is mild, you may not have symptoms.  How is this diagnosed?  This condition is diagnosed based on:  · Blood tests.  · Your medical history.  · A physical exam.  · Bone marrow biopsy.  Your health care provider may also check your stool (feces) for blood and may do additional testing to look for the cause of your bleeding.  You may also have other tests, including:  · Imaging tests, such as a CT scan or MRI.  · Endoscopy.  · Colonoscopy.  How is this treated?  Treatment for this condition depends on the cause. If you continue to lose a lot of blood, you may  need to be treated at a hospital. Treatment may include:  · Taking supplements of iron, vitamin B12, or folic acid.  · Taking a hormone medicine (erythropoietin) that can help to stimulate red blood cell growth.  · Having a blood transfusion. This may be needed if you lose a lot of blood.  · Making changes to your diet.  · Having surgery to remove your spleen.  Follow these instructions at home:  · Take over-the-counter and prescription medicines only as told by your health care provider.  · Take supplements only as told by your health care provider.  · Follow any diet instructions that you were given.  · Keep all follow-up visits as told by your health care provider. This is important.  Contact a health care provider if:  · You develop new bleeding anywhere in the body.  Get help right away if:  · You are very weak.  · You are short of breath.  · You have pain in your abdomen or chest.  · You are dizzy or feel faint.  · You have trouble concentrating.  · You have bloody or black, tarry stools.  · You vomit repeatedly or you vomit up blood.  Summary  · Anemia is a condition in which you do not have enough red blood cells or enough of a substance in your red blood cells that carries oxygen (hemoglobin).  · Symptoms may occur suddenly or develop slowly.  · If your anemia is mild, you may not have symptoms.  · This condition is diagnosed with blood tests as well as a medical history and physical exam. Other tests may be needed.  · Treatment for this condition depends on the cause of the anemia.  This information is not intended to replace advice given to you by your health care provider. Make sure you discuss any questions you have with your health care provider.  Document Released: 01/25/2006 Document Revised: 01/19/2018 Document Reviewed: 01/19/2018  [employer] Interactive Patient Education © 2019 [employer]  BMI for Adults    Body mass index (BMI) is a number that is calculated from a person's weight and height.  "BMI may help to estimate how much of a person's weight is composed of fat. BMI can help identify those who may be at higher risk for certain medical problems.  How is BMI used with adults?  BMI is used as a screening tool to identify possible weight problems. It is used to check whether a person is obese, overweight, healthy weight, or underweight.  How is BMI calculated?  BMI measures your weight and compares it to your height. This can be done either in English (U[place] or metric measurements. Note that charts are available to help you find your BMI quickly and easily without having to do these calculations yourself.  To calculate your BMI in English (U.[place] measurements, your health care provider will:  1. Measure your weight in pounds (lb).  2. Multiply the number of pounds by 703.  ? For example, for a person who weighs 180 lb, multiply that number by 703, which equals 126,540.  3. Measure your height in inches (in). Then multiply that number by itself to get a measurement called \"inches squared.\"  ? For example, for a person who is 70 in tall, the \"inches squared\" measurement is 70 in x 70 in, which equals 4900 inches squared.  4. Divide the total from Step 2 (number of lb x 703) by the total from Step 3 (inches squared): 126,540 ÷ 4900 = 25.8. This is your BMI.  To calculate your BMI in metric measurements, your health care provider will:  1. Measure your weight in kilograms (kg).  2. Measure your height in meters (m). Then multiply that number by itself to get a measurement called \"meters squared.\"  ? For example, for a person who is 1.75 m tall, the \"meters squared\" measurement is 1.75 m x 1.75 m, which is equal to 3.1 meters squared.  3. Divide the number of kilograms (your weight) by the meters squared number. In this example: 70 ÷ 3.1 = 22.6. This is your BMI.  How is BMI interpreted?  To interpret your results, your health care provider will use BMI charts to identify whether you are underweight, normal " weight, overweight, or obese. The following guidelines will be used:  · Underweight: BMI less than 18.5.  · Normal weight: BMI between 18.5 and 24.9.  · Overweight: BMI between 25 and 29.9.  · Obese: BMI of 30 and above.  Please note:  · Weight includes both fat and muscle, so someone with a muscular build, such as an athlete, may have a BMI that is higher than 24.9. In cases like these, BMI is not an accurate measure of body fat.  · To determine if excess body fat is the cause of a BMI of 25 or higher, further assessments may need to be done by a health care provider.  · BMI is usually interpreted in the same way for men and women.  Why is BMI a useful tool?  BMI is useful in two ways:  · Identifying a weight problem that may be related to a medical condition, or that may increase the risk for medical problems.  · Promoting lifestyle and diet changes in order to reach a healthy weight.  Summary  · Body mass index (BMI) is a number that is calculated from a person's weight and height.  · BMI may help to estimate how much of a person's weight is composed of fat. BMI can help identify those who may be at higher risk for certain medical problems.  · BMI can be measured using English measurements or metric measurements.  · To interpret your results, your health care provider will use BMI charts to identify whether you are underweight, normal weight, overweight, or obese.  This information is not intended to replace advice given to you by your health care provider. Make sure you discuss any questions you have with your health care provider.  Document Released: 08/29/2005 Document Revised: 10/31/2018 Document Reviewed: 10/31/2018  [employer] Interactive Patient Education © 2019 [employer]

## 2019-12-18 NOTE — PROGRESS NOTES
Chief Complaint   Patient presents with   • Anemia       Subjective    Xanderlong Wallace is a 79 y.o. male.    History of Present Illness    Mr. Wallace presented to GI clinic for follow-up visit today.  He feels well currently.  No GI complaints at this time.  Off iron supplements due to side effects.  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     The following portions of the patient's history were reviewed and updated as appropriate:   Past Medical History:   Diagnosis Date   • Aortic stenosis    • Asthma    • COPD (chronic obstructive pulmonary disease) (CMS/HCC)    • Coronary artery disease involving coronary bypass graft     CABG 2012, stent 2016, stent 2005   • Emphysema of lung (CMS/HCC)    • Hyperlipidemia    • Hypertension    • PVD (peripheral vascular disease) (CMS/HCC)    • Seizure disorder (CMS/HCC)    • Skin cancer of lip    • Sleep apnea      Past Surgical History:   Procedure Laterality Date   • APPENDECTOMY     • CARDIAC SURGERY      CABG 2012   • CAROTID STENT     • COLON SURGERY     • COLONOSCOPY N/A 11/6/2019    Procedure: COLONOSCOPY;  Surgeon: Judith Conti MD;  Location: [facility] ENDOSCOPY;  Service: Gastroenterology   • CORONARY ARTERY BYPASS GRAFT     • ENDOSCOPY N/A 11/6/2019    Procedure: ESOPHAGOGASTRODUODENOSCOPY;  Surgeon: Judith Conti MD;  Location: [facility] ENDOSCOPY;  Service: Gastroenterology   • HEMORRHOIDECTOMY     • HERNIA REPAIR       Family History   Problem Relation Age of Onset   • Cancer Father        Prior to Admission medications    Medication Sig Start Date End Date Taking? Authorizing Provider   albuterol (PROVENTIL HFA;VENTOLIN HFA) 108 (90 Base) MCG/ACT inhaler Inhale 2 puffs Every 4 (Four) Hours As Needed for Wheezing. 11/5/18  Yes Deni Garcia MD   B Complex-Biotin-FA (SUPER B-COMPLEX) tablet Take  by mouth.   Yes Provider, MD Zeynep   budesonide-formoterol (SYMBICORT) 160-4.5 MCG/ACT inhaler  Inhale 2 puffs 2 (Two) Times a Day.   Yes Zeynep Shrestha MD   clopidogrel (PLAVIX) 75 MG tablet Take 1 tablet by mouth Daily. 9/4/19  Yes Mike Montes MD   diltiaZEM CD (CARDIZEM CD) 180 MG 24 hr capsule Take 1 capsule by mouth Daily. 9/4/19  Yes Mike Montes MD   gabapentin (NEURONTIN) 600 MG tablet Take 600 mg by mouth 3 (three) times a day.   Yes Zeynep Shrestha MD   lamoTRIgine (LaMICtal) 200 MG tablet Take 200 mg by mouth daily.   Yes Zeynep Shrestha MD   lisinopril (PRINIVIL,ZESTRIL) 20 MG tablet Take 1 tablet by mouth Daily. 9/4/19  Yes Mike Montes MD   mupirocin (BACTROBAN) 2 % cream Apply  topically to the appropriate area as directed 3 (Three) Times a Day. 3/28/19  Yes Deni Garcia MD   nitroglycerin (NITROSTAT) 0.4 MG SL tablet 1 under the tongue as needed for angina, may repeat q5mins for up three doses 11/26/18  Yes Branden Corral MD   Omega-3 Fatty Acids (FISH OIL) 1000 MG capsule capsule Take  by mouth daily with breakfast.   Yes Zeynep Shrestha MD   pantoprazole (PROTONIX) 40 MG EC tablet Take 1 tablet by mouth 2 (Two) Times a Day Before Meals. 11/18/19  Yes Deni Garcia MD   rosuvastatin (CRESTOR) 20 MG tablet Take 1 tablet by mouth Daily. 9/4/19  Yes Mike Montes MD   tamsulosin (FLOMAX) 0.4 MG capsule 24 hr capsule Take 1 capsule by mouth Every Night. 1/29/18  Yes Leidy Stanford DO   triamcinolone (KENALOG) 0.5 % cream  10/29/18  Yes Zeynep Shrestha MD   ferrous sulfate 324 (65 Fe) MG tablet delayed-release EC tablet Take 1 tablet by mouth 2 (Two) Times a Day With Meals. 11/18/19 12/18/19  Deni Garcia MD     Allergies   Allergen Reactions   • Doxycycline    • Amoxicillin    • Ceftin [Cefuroxime]    • Methylphenidate Derivatives    • Nystatin    • Sulfur    • Theophyllines Other (See Comments)     Not specified        Social History     Socioeconomic History   • Marital status:      Spouse name: Not on file   • Number of children: Not  "on file   • Years of education: Not on file   • Highest education level: Not on file   Tobacco Use   • Smoking status: Former Smoker   • Smokeless tobacco: Never Used   Substance and Sexual Activity   • Alcohol use: No   • Drug use: No   • Sexual activity: Defer       Review of Systems  Review of Systems   Constitutional: Negative for chills, fatigue, fever and unexpected weight change.   HENT: Negative for congestion, ear discharge, hearing loss, nosebleeds and sore throat.    Eyes: Negative for pain, discharge and redness.   Respiratory: Negative for cough, chest tightness, shortness of breath and wheezing.    Cardiovascular: Negative for chest pain and palpitations.   Gastrointestinal: Negative for abdominal distention, abdominal pain, blood in stool, constipation, diarrhea, nausea and vomiting.   Endocrine: Negative for cold intolerance, polydipsia, polyphagia and polyuria.   Genitourinary: Negative for dysuria, flank pain, frequency, hematuria and urgency.   Musculoskeletal: Negative for arthralgias, back pain, joint swelling and myalgias.   Skin: Negative for color change, pallor and rash.   Neurological: Negative for tremors, seizures, syncope, weakness and headaches.   Hematological: Negative for adenopathy. Does not bruise/bleed easily.   Psychiatric/Behavioral: Negative for behavioral problems, confusion, dysphoric mood, hallucinations and suicidal ideas. The patient is not nervous/anxious.         /53 (BP Location: Left arm, Patient Position: Sitting)   Pulse 68   Ht 175.3 cm (69\")   Wt 81 kg (178 lb 9.6 oz)   BMI 26.37 kg/m²     Objective    Physical Exam   Constitutional: He is oriented to person, place, and time. He appears well-developed and well-nourished.   HENT:   Head: Normocephalic and atraumatic.   Mouth/Throat: Oropharynx is clear and moist.   Eyes: Pupils are equal, round, and reactive to light. Conjunctivae and EOM are normal.   Neck: Normal range of motion. Neck supple. No " thyromegaly present.   Cardiovascular: Normal rate, regular rhythm and normal heart sounds.   No murmur heard.  Pulmonary/Chest: Effort normal and breath sounds normal. He has no wheezes.   Abdominal: Soft. Bowel sounds are normal. He exhibits no distension and no mass. There is no tenderness. No hernia.   Genitourinary:   Genitourinary Comments: No lesions noted   Musculoskeletal: Normal range of motion. He exhibits no edema or tenderness.   Lymphadenopathy:     He has no cervical adenopathy.   Neurological: He is alert and oriented to person, place, and time. No cranial nerve deficit.   Skin: Skin is warm and dry. No rash noted.   Psychiatric: He has a normal mood and affect. Thought content normal.     Office Visit on 11/18/2019   Component Date Value Ref Range Status   • Hemoglobin 11/18/2019 10.2* 13.0 - 17.7 g/dL Final   • Hematocrit 11/18/2019 31.6* 37.5 - 51.0 % Final     Assessment/Plan      1. Iron deficiency anemia due to chronic blood loss    1.  Iron deficiency anemia, likely mild multifactorial.  Chronic GI blood loss associated with renal insufficiency and possible nutritional deficiency.  Continue iron infusions.    Obtain CBC today and obtain KUB to ensure PillCam passage.    2.  Colon polyps, repeat colonoscopy in 3 years.      Orders placed during this encounter include:  Orders Placed This Encounter   Procedures   • XR Abdomen KUB     Order Specific Question:   Reason for Exam:     Answer:   pillcam   • CBC (No Diff)       * Surgery not found *    Review and/or summary of lab tests, radiology, procedures, medications. Review and summary of old records and obtaining of history. The risks and benefits of my recommendations, as well as other treatment options were discussed with the patient and family member(s) today. Questions were answered.    No orders of the defined types were placed in this encounter.      Follow-up: Return in about 1 month (around 1/18/2020).               Results for orders  placed or performed in visit on 11/18/19   Hemoglobin & Hematocrit, Blood   Result Value Ref Range    Hemoglobin 10.2 (L) 13.0 - 17.7 g/dL    Hematocrit 31.6 (L) 37.5 - 51.0 %   Results for orders placed or performed during the hospital encounter of 11/05/19   PREVIOUS HISTORY   Result Value Ref Range    Previous History Previous Record on File    Transfusion Reaction Evaluation   Result Value Ref Range    Transfusion Reaction Interp 1 See Transfusion Reaction Report    CBC Auto Differential   Result Value Ref Range    WBC 11.09 (H) 3.40 - 10.80 10*3/mm3    RBC 3.43 (L) 4.14 - 5.80 10*6/mm3    Hemoglobin 9.7 (L) 13.0 - 17.7 g/dL    Hematocrit 29.2 (L) 37.5 - 51.0 %    MCV 85.1 79.0 - 97.0 fL    MCH 28.3 26.6 - 33.0 pg    MCHC 33.2 31.5 - 35.7 g/dL    RDW 13.7 12.3 - 15.4 %    RDW-SD 42.9 37.0 - 54.0 fl    MPV 10.0 6.0 - 12.0 fL    Platelets 209 140 - 450 10*3/mm3    Neutrophil % 81.6 (H) 42.7 - 76.0 %    Lymphocyte % 8.1 (L) 19.6 - 45.3 %    Monocyte % 9.7 5.0 - 12.0 %    Eosinophil % 0.1 (L) 0.3 - 6.2 %    Basophil % 0.1 0.0 - 1.5 %    Immature Grans % 0.4 0.0 - 0.5 %    Neutrophils, Absolute 9.05 (H) 1.70 - 7.00 10*3/mm3    Lymphocytes, Absolute 0.90 0.70 - 3.10 10*3/mm3    Monocytes, Absolute 1.08 (H) 0.10 - 0.90 10*3/mm3    Eosinophils, Absolute 0.01 0.00 - 0.40 10*3/mm3    Basophils, Absolute 0.01 0.00 - 0.20 10*3/mm3    Immature Grans, Absolute 0.04 0.00 - 0.05 10*3/mm3    nRBC 0.0 0.0 - 0.2 /100 WBC   CBC Auto Differential   Result Value Ref Range    WBC 4.18 3.40 - 10.80 10*3/mm3    RBC 3.40 (L) 4.14 - 5.80 10*6/mm3    Hemoglobin 9.6 (L) 13.0 - 17.7 g/dL    Hematocrit 29.1 (L) 37.5 - 51.0 %    MCV 85.6 79.0 - 97.0 fL    MCH 28.2 26.6 - 33.0 pg    MCHC 33.0 31.5 - 35.7 g/dL    RDW 13.7 12.3 - 15.4 %    RDW-SD 42.6 37.0 - 54.0 fl    MPV 9.7 6.0 - 12.0 fL    Platelets 196 140 - 450 10*3/mm3    Neutrophil % 88.8 (H) 42.7 - 76.0 %    Lymphocyte % 9.1 (L) 19.6 - 45.3 %    Monocyte % 1.4 (L) 5.0 - 12.0 %     Eosinophil % 0.0 (L) 0.3 - 6.2 %    Basophil % 0.0 0.0 - 1.5 %    Immature Grans % 0.7 (H) 0.0 - 0.5 %    Neutrophils, Absolute 3.71 1.70 - 7.00 10*3/mm3    Lymphocytes, Absolute 0.38 (L) 0.70 - 3.10 10*3/mm3    Monocytes, Absolute 0.06 (L) 0.10 - 0.90 10*3/mm3    Eosinophils, Absolute 0.00 0.00 - 0.40 10*3/mm3    Basophils, Absolute 0.00 0.00 - 0.20 10*3/mm3    Immature Grans, Absolute 0.03 0.00 - 0.05 10*3/mm3    nRBC 0.0 0.0 - 0.2 /100 WBC   Iron Profile   Result Value Ref Range    Iron 16 (L) 59 - 158 mcg/dL    Iron Saturation 4 (L) 20 - 50 %    Transferrin 270 200 - 360 mg/dL    TIBC 402 298 - 536 mcg/dL   Hemoglobin & Hematocrit, Blood   Result Value Ref Range    Hemoglobin 9.5 (L) 13.0 - 17.7 g/dL    Hematocrit 28.0 (L) 37.5 - 51.0 %   Tissue Pathology Exam   Result Value Ref Range    Case Report       Surgical Pathology Report                         Case: [id number]                                  Authorizing Provider:  Judith Conti MD      Collected:           11/06/2019 11:03 AM          [facility]             Received:            11/07/2019 06:44 AM                                 [facility]                                                     Pathologist:           Mykel Osorio MD                                                        Specimens:   1) - Small Intestine                                                                                2) - Esophagus, eg junction                                                                         3) - Large Intestine, Right / Ascending Colon, cold bx cold snare polyp                             4) - Large Intestine, Sigmoid Colon, cold snare                                            Final Diagnosis       1.  SMALL INTESTINE, MUCOSAL BIOPSY:   MILD CHRONIC INFLAMMATION.     2.  ESOPHAGOGASTRIC JUNCTION, MUCOSAL BIOPSY:   SQUAMOUS AND COLUMNAR LINED MUCOSA WITH MILD CHRONIC INFLAMMATION.   NO EVIDENCE OF  GOBLET CELL METAPLASIA.     3.  TUBULAR ADENOMA, ASCENDING COLON.     4.  TUBULAR ADENOMA, SIGMOID COLON.      Gross Description       In 4 containers, each of these show mucosal biopsies measuring up to 0.3 cm in greatest dimension.  Embedded accordingly:  1A small intestine, 2A esophagogastric junction, 3A ascending colon, 4A sigmoid colon.        Protime-INR   Result Value Ref Range    Protime 13.3 11.1 - 15.3 Seconds    INR 1.03 0.80 - 1.20   CBC (No Diff)   Result Value Ref Range    WBC 4.78 3.40 - 10.80 10*3/mm3    RBC 2.27 (L) 4.14 - 5.80 10*6/mm3    Hemoglobin 6.2 (C) 13.0 - 17.7 g/dL    Hematocrit 19.5 (C) 37.5 - 51.0 %    MCV 85.9 79.0 - 97.0 fL    MCH 27.3 26.6 - 33.0 pg    MCHC 31.8 31.5 - 35.7 g/dL    RDW 13.2 12.3 - 15.4 %    RDW-SD 41.6 37.0 - 54.0 fl    MPV 9.3 6.0 - 12.0 fL    Platelets 188 140 - 450 10*3/mm3   Prepare RBC, 1 Units   Result Value Ref Range    Product Code D4744J18     Unit Number [id number]     UNIT  ABO O     UNIT  RH POS     Dispense Status PT     Blood Type OPOS     Blood Expiration Date 201911192359     Blood Type Barcode 5100    Prepare RBC, 2 Units   Result Value Ref Range    Product Code O1653K98     Unit Number [id number]     UNIT  ABO O     UNIT  RH POS     Dispense Status PT     Blood Type OPOS     Blood Expiration Date 201911192359     Blood Type Barcode 5100     Product Code A0064G61     Unit Number [id number]     UNIT  ABO O     UNIT  RH POS     Dispense Status PT     Blood Type OPOS     Blood Expiration Date 201911192359     Blood Type Barcode 5100    Type & Screen   Result Value Ref Range    ABO Type O     RH type Positive     Antibody Screen Negative     T&S Expiration Date 11/8/2019 11:59:59 PM      *Note: Due to a large number of results and/or encounters for the requested time period, some results have not been displayed. A complete set of results can be found in Results Review.         This document has been electronically signed by Judith  MD Sushila on December 18, 2019 2:04 PM

## 2020-01-20 ENCOUNTER — OFFICE VISIT (OUTPATIENT)
Dept: GASTROENTEROLOGY | Facility: CLINIC | Age: 80
End: 2020-01-20

## 2020-01-20 ENCOUNTER — APPOINTMENT (OUTPATIENT)
Dept: LAB | Facility: HOSPITAL | Age: 80
End: 2020-01-20

## 2020-01-20 ENCOUNTER — OFFICE VISIT (OUTPATIENT)
Dept: FAMILY MEDICINE CLINIC | Facility: CLINIC | Age: 80
End: 2020-01-20

## 2020-01-20 VITALS
SYSTOLIC BLOOD PRESSURE: 112 MMHG | DIASTOLIC BLOOD PRESSURE: 63 MMHG | BODY MASS INDEX: 27.11 KG/M2 | HEIGHT: 69 IN | HEART RATE: 80 BPM | WEIGHT: 183 LBS

## 2020-01-20 VITALS
DIASTOLIC BLOOD PRESSURE: 60 MMHG | WEIGHT: 180.5 LBS | BODY MASS INDEX: 26.73 KG/M2 | HEIGHT: 69 IN | SYSTOLIC BLOOD PRESSURE: 131 MMHG

## 2020-01-20 DIAGNOSIS — D50.0 IRON DEFICIENCY ANEMIA DUE TO CHRONIC BLOOD LOSS: Primary | ICD-10-CM

## 2020-01-20 DIAGNOSIS — S51.801A OPEN WOUND OF RIGHT FOREARM, INITIAL ENCOUNTER: ICD-10-CM

## 2020-01-20 DIAGNOSIS — R29.6 FALLS FREQUENTLY: Primary | ICD-10-CM

## 2020-01-20 DIAGNOSIS — S81.802A WOUND OF LEFT LOWER EXTREMITY, INITIAL ENCOUNTER: ICD-10-CM

## 2020-01-20 LAB
DEPRECATED RDW RBC AUTO: 45.6 FL (ref 37–54)
ERYTHROCYTE [DISTWIDTH] IN BLOOD BY AUTOMATED COUNT: 14.5 % (ref 12.3–15.4)
HCT VFR BLD AUTO: 29.3 % (ref 37.5–51)
HGB BLD-MCNC: 9.6 G/DL (ref 13–17.7)
MCH RBC QN AUTO: 28.2 PG (ref 26.6–33)
MCHC RBC AUTO-ENTMCNC: 32.8 G/DL (ref 31.5–35.7)
MCV RBC AUTO: 86.2 FL (ref 79–97)
PLATELET # BLD AUTO: 210 10*3/MM3 (ref 140–450)
PMV BLD AUTO: 10.3 FL (ref 6–12)
RBC # BLD AUTO: 3.4 10*6/MM3 (ref 4.14–5.8)
WBC NRBC COR # BLD: 5.7 10*3/MM3 (ref 3.4–10.8)

## 2020-01-20 PROCEDURE — 99212 OFFICE O/P EST SF 10 MIN: CPT | Performed by: INTERNAL MEDICINE

## 2020-01-20 PROCEDURE — 99213 OFFICE O/P EST LOW 20 MIN: CPT | Performed by: FAMILY MEDICINE

## 2020-01-20 PROCEDURE — 36415 COLL VENOUS BLD VENIPUNCTURE: CPT | Performed by: INTERNAL MEDICINE

## 2020-01-20 PROCEDURE — 85027 COMPLETE CBC AUTOMATED: CPT | Performed by: INTERNAL MEDICINE

## 2020-01-20 RX ORDER — MUPIROCIN CALCIUM 20 MG/G
CREAM TOPICAL 3 TIMES DAILY
Qty: 30 G | Refills: 1 | Status: ON HOLD | OUTPATIENT
Start: 2020-01-20 | End: 2021-03-24

## 2020-01-20 NOTE — PROGRESS NOTES
Subjective   Xander Wallace is a 79 y.o. male.     History of Present Illness   requesting evaluation skin tear wound.  States continues to be not very mindful of falling is fallen several times because not using his walker all the time or not being a safety conscious.  We have counseled again on same.  Has developed a small skin tear is actually healing on the right dorsal forearm.  Multiple other small nicks and wounds to the arms are healing  HPI    The following portions of the patient's history were reviewed and updated as appropriate: allergies, current medications, past family history, past medical history, past social history, past surgical history and problem list.    Review of Systems  Review of Systems   Constitutional: Negative for activity change, appetite change, fatigue and unexpected weight change.   HENT: Negative for trouble swallowing and voice change.    Eyes: Negative for redness and visual disturbance.   Respiratory: Negative for cough and wheezing.    Cardiovascular: Negative for chest pain and palpitations.   Gastrointestinal: Negative for abdominal pain, constipation, diarrhea, nausea and vomiting.   Genitourinary: Negative for urgency.   Musculoskeletal: Negative for joint swelling.   Skin: Positive for wound.   Neurological: Negative for syncope and headaches.   Hematological: Negative for adenopathy.   Psychiatric/Behavioral: Negative for sleep disturbance.       Objective   Physical Exam  Physical Exam   Constitutional: He is oriented to person, place, and time. He appears well-developed.   HENT:   Head: Normocephalic.   Nose: Nose normal.   Eyes: Pupils are equal, round, and reactive to light.   Neck: Normal range of motion. No thyromegaly present.   Cardiovascular: Normal rate, regular rhythm and intact distal pulses. Exam reveals no gallop and no friction rub.   No murmur heard.  Pulmonary/Chest: Effort normal.   Abdominal: Soft. He exhibits no distension and no mass. There  "is no tenderness.   Musculoskeletal: Normal range of motion.   Right forearm shows several small nicks there is a small eschar about 2 cm or less healing wound curvilinear right dorsal forearm.  Old bruising noted.  Similar left   Neurological: He is alert and oriented to person, place, and time. He has normal reflexes.   Get up and go 3 to 5 seconds with walker.  Walks with walker fairly well   Skin: Skin is warm and dry.   Psychiatric: He has a normal mood and affect. His speech is normal and behavior is normal.         Visit Vitals  /60 (BP Location: Left arm, Patient Position: Sitting, Cuff Size: Large Adult)   Ht 175.3 cm (69\")   Wt 81.9 kg (180 lb 8 oz)   BMI 26.66 kg/m²     Body mass index is 26.66 kg/m².      Assessment/Plan   Xander was seen today for skin tear.    Diagnoses and all orders for this visit:    Falls frequently    Open wound of right forearm, initial encounter    Wound of left lower extremity, initial encounter  -     mupirocin (BACTROBAN) 2 % cream; Apply  topically to the appropriate area as directed 3 (Three) Times a Day.    Counseled on wound care soap and water no manipulation ointment etc.  Main team today's is actually fall prevention including being mindful not taking risk and chances using walkers at all time.  Wife is in agreement.  The patient has a history of falls. I did complete a risk assessment for falls. A plan of care for falls was not documented.    "

## 2020-01-24 ENCOUNTER — TELEPHONE (OUTPATIENT)
Dept: FAMILY MEDICINE CLINIC | Facility: CLINIC | Age: 80
End: 2020-01-24

## 2020-01-24 DIAGNOSIS — J41.8 MIXED SIMPLE AND MUCOPURULENT CHRONIC BRONCHITIS (HCC): ICD-10-CM

## 2020-01-24 RX ORDER — ALBUTEROL SULFATE 90 UG/1
2 AEROSOL, METERED RESPIRATORY (INHALATION) EVERY 4 HOURS PRN
Qty: 3 INHALER | Refills: 3 | Status: SHIPPED | OUTPATIENT
Start: 2020-01-24 | End: 2020-03-04 | Stop reason: SDUPTHER

## 2020-01-24 NOTE — TELEPHONE ENCOUNTER
Pt needs his ventolin inhaler called in. He is almost out. Wife wanted to know if you could call it in ASAP?

## 2020-02-18 ENCOUNTER — OFFICE VISIT (OUTPATIENT)
Dept: FAMILY MEDICINE CLINIC | Facility: CLINIC | Age: 80
End: 2020-02-18

## 2020-02-18 VITALS
SYSTOLIC BLOOD PRESSURE: 116 MMHG | HEIGHT: 69 IN | BODY MASS INDEX: 25.92 KG/M2 | DIASTOLIC BLOOD PRESSURE: 68 MMHG | WEIGHT: 175 LBS

## 2020-02-18 DIAGNOSIS — J41.8 MIXED SIMPLE AND MUCOPURULENT CHRONIC BRONCHITIS (HCC): ICD-10-CM

## 2020-02-18 DIAGNOSIS — D64.9 SYMPTOMATIC ANEMIA: Chronic | ICD-10-CM

## 2020-02-18 DIAGNOSIS — I73.9 PERIPHERAL VASCULAR DISEASE, UNSPECIFIED (HCC): ICD-10-CM

## 2020-02-18 DIAGNOSIS — Z71.89 ADVANCE CARE PLANNING: ICD-10-CM

## 2020-02-18 DIAGNOSIS — I25.708 CORONARY ARTERY DISEASE INVOLVING CORONARY BYPASS GRAFT OF NATIVE HEART WITH OTHER FORMS OF ANGINA PECTORIS (HCC): ICD-10-CM

## 2020-02-18 DIAGNOSIS — G40.909 SEIZURE DISORDER (HCC): ICD-10-CM

## 2020-02-18 DIAGNOSIS — I73.9 PERIPHERAL ARTERIAL DISEASE (HCC): Primary | Chronic | ICD-10-CM

## 2020-02-18 PROCEDURE — 99214 OFFICE O/P EST MOD 30 MIN: CPT | Performed by: FAMILY MEDICINE

## 2020-02-18 NOTE — PROGRESS NOTES
Subjective   Xander Wallace is a 79 y.o. male.     History of Present Illness    evaluation COPD vascular disease history of seizure disorder history of GI bleed with anemia other diagnoses listed below.  In the interim hemoglobin holding steady about 9.6-10 noticed no active bleeding anywhere.  Using his walker all the time now.  Activity mainly limited now by COPD.  Is using all inhalers.  States overall feels fairly well.  Has had an eye exam etc.  Lab work reviewed.  Spent some time today also discussing advance planning.  No seizure activity  HPI    The following portions of the patient's history were reviewed and updated as appropriate: allergies, current medications, past family history, past medical history, past social history, past surgical history and problem list.    Review of Systems  Review of Systems   Constitutional: Negative for activity change, appetite change, fatigue and unexpected weight change.   HENT: Negative for trouble swallowing and voice change.    Eyes: Negative for redness and visual disturbance.   Respiratory: Positive for shortness of breath (COPD limited). Negative for cough and wheezing.    Cardiovascular: Negative for chest pain and palpitations.   Gastrointestinal: Negative for abdominal pain, constipation, diarrhea, nausea and vomiting.   Genitourinary: Negative for urgency.   Musculoskeletal: Negative for joint swelling.   Neurological: Negative for syncope and headaches.   Hematological: Negative for adenopathy.   Psychiatric/Behavioral: Negative for sleep disturbance.       Objective   Physical Exam  Physical Exam   Constitutional: He is oriented to person, place, and time. He appears well-developed.   HENT:   Head: Normocephalic.   Nose: Nose normal.   Eyes: Pupils are equal, round, and reactive to light.   Neck: Normal range of motion. No thyromegaly present.   Cardiovascular: Normal rate, normal heart sounds and intact distal pulses. An irregularly irregular rhythm  "present. Exam reveals no gallop and no friction rub.   No murmur heard.  Rate 82   Pulmonary/Chest: He has decreased breath sounds.   Increased AP diameter mildly increased expiratory phase not short of breath at rest decreased breath sounds bases   Abdominal: Soft. He exhibits no distension and no mass. There is no tenderness.   Musculoskeletal: Normal range of motion.   Loss both lower extremities no tissue loss or.  Trace pulses   Neurological: He is alert and oriented to person, place, and time. He has normal reflexes.   Skin: Skin is warm and dry.   Psychiatric: He has a normal mood and affect. His speech is normal and behavior is normal.         Visit Vitals  /68   Ht 175.3 cm (69\")   Wt 79.4 kg (175 lb)   BMI 25.84 kg/m²     Body mass index is 25.84 kg/m².      Assessment/Plan   Xander was seen today for follow-up.    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  Following up with all subspecialist.  Advance Care Planning   ACP discussion was held with the patient during this visit. Patient does not have an advance directive, information provided.  Recheck 6 months  "

## 2020-03-04 ENCOUNTER — TELEPHONE (OUTPATIENT)
Dept: FAMILY MEDICINE CLINIC | Facility: CLINIC | Age: 80
End: 2020-03-04

## 2020-03-04 DIAGNOSIS — J41.8 MIXED SIMPLE AND MUCOPURULENT CHRONIC BRONCHITIS (HCC): ICD-10-CM

## 2020-03-04 RX ORDER — ALBUTEROL SULFATE 90 UG/1
2 AEROSOL, METERED RESPIRATORY (INHALATION) EVERY 4 HOURS PRN
Qty: 3 INHALER | Refills: 3 | Status: SHIPPED | OUTPATIENT
Start: 2020-03-04 | End: 2020-07-20 | Stop reason: SDUPTHER

## 2020-03-04 NOTE — TELEPHONE ENCOUNTER
Pt needs his albuterol inhaler refill to [facility] on [address]. He is currently hospitalized at [facility]. Thanks!

## 2020-05-14 RX ORDER — CLOPIDOGREL BISULFATE 75 MG/1
75 TABLET ORAL DAILY
Qty: 90 TABLET | Refills: 3 | Status: SHIPPED | OUTPATIENT
Start: 2020-05-14 | End: 2021-05-11

## 2020-06-22 ENCOUNTER — OFFICE VISIT (OUTPATIENT)
Dept: CARDIOLOGY | Facility: CLINIC | Age: 80
End: 2020-06-22

## 2020-06-22 VITALS
SYSTOLIC BLOOD PRESSURE: 110 MMHG | HEART RATE: 62 BPM | DIASTOLIC BLOOD PRESSURE: 60 MMHG | BODY MASS INDEX: 25.03 KG/M2 | HEIGHT: 69 IN | OXYGEN SATURATION: 95 % | WEIGHT: 169 LBS

## 2020-06-22 DIAGNOSIS — I35.0 NONRHEUMATIC AORTIC VALVE STENOSIS: Primary | ICD-10-CM

## 2020-06-22 DIAGNOSIS — I25.708 CORONARY ARTERY DISEASE INVOLVING CORONARY BYPASS GRAFT OF NATIVE HEART WITH OTHER FORMS OF ANGINA PECTORIS (HCC): Primary | Chronic | ICD-10-CM

## 2020-06-22 PROCEDURE — 99214 OFFICE O/P EST MOD 30 MIN: CPT | Performed by: INTERNAL MEDICINE

## 2020-06-22 PROCEDURE — 93000 ELECTROCARDIOGRAM COMPLETE: CPT | Performed by: INTERNAL MEDICINE

## 2020-06-22 RX ORDER — MULTIPLE VITAMINS W/ MINERALS TAB 9MG-400MCG
1 TAB ORAL DAILY
COMMUNITY

## 2020-06-22 RX ORDER — ASCORBIC ACID 500 MG
500 TABLET ORAL 2 TIMES DAILY
COMMUNITY

## 2020-06-22 NOTE — PROGRESS NOTES
[facility] Cardiology  OFFICE NOTE    Cardiovascular Medicine  Mike Montes M.D., RPVI         No referring provider defined for this encounter.    Thank you for asking me to see Xander Wallace for CAD.    History of Present Illness  This is a 80 y.o. male with:    78 y.o. male     12/18/2018  1. Coronary artery disease involving coronary bypass graft of native heart without angina pectoris    2. Chronic renal insufficiency, stage 2 (mild)    3. Mixed hyperlipidemia    4. Peripheral arterial disease (CMS/HCC)    5. Essential hypertension          Chief complaint -Follow-up CAD        History of present Illness- 80 yr old gentleman with history of coronary disease prior CABG in the past and it has history of peripheral vascular disease with total occlusion of the SFA.  Patient had failed attempted PTA of the right SFA with antegrade retrograde approach. He has claudication pain With walking  2-3 blocks, but no rest pain but does not want to do any PTA at this time and he will continue to walk as much as he can.  He denies any chest pain.  He has shortness of breath secondary to COPD and is seeing a pulmonologist in [place].  He is on multiple inhalers.  He denies any TIA symptoms of GI symptoms.He stays active as much as he can  and he gets around with a walking stick.  He denies any CNS symptoms.  He had frequent falls however those falls in setting of tripping.  Denied any chest pain.    06/22/2020:  No acute events since last visit.  Patient COPD exacerbation back in March however his inhalers have been adjusted with improvement in his breathing.  He continues to have claudications but unchanged from before and is not affecting his quality of life.  Denying any chest pain.      Review of Systems - ROS  Constitution: Negative for weakness, weight gain and weight loss.   HENT: Negative for congestion.    Eyes: Negative for blurred vision.   Cardiovascular: As mentioned  above  Respiratory: Negative for cough and hemoptysis.    Endocrine: Negative for polydipsia and polyuria.   Hematologic/Lymphatic: Negative for bleeding problem. Does not bruise/bleed easily.   Skin: Negative for flushing.   Musculoskeletal: Negative for neck pain and stiffness.   Gastrointestinal: Negative for abdominal pain, diarrhea, jaundice, melena, nausea and vomiting.   Genitourinary: Negative for dysuria and hematuria.   Neurological: Negative for dizziness, focal weakness and numbness.   Psychiatric/Behavioral: Negative for altered mental status and depression.          All other systems were reviewed and were negative.    family history includes Cancer in his father.     reports that he has quit smoking. He has never used smokeless tobacco. He reports that he does not drink alcohol or use drugs.    Allergies   Allergen Reactions   • Doxycycline GI Intolerance   • Methylphenidate Derivatives    • Sulfur GI Intolerance     Stomach issues and rash    • Theophyllines Unknown - Low Severity     Pt states he didn't remember        • Amoxicillin Rash   • Ceftin [Cefuroxime] Rash   • Nystatin Rash         Current Outpatient Medications:   •  albuterol sulfate  (90 Base) MCG/ACT inhaler, Inhale 2 puffs Every 4 (Four) Hours As Needed for Wheezing., Disp: 3 inhaler, Rfl: 3  •  B Complex-Biotin-FA (SUPER B-COMPLEX) tablet, Take  by mouth., Disp: , Rfl:   •  budesonide-formoterol (SYMBICORT) 160-4.5 MCG/ACT inhaler, Inhale 2 puffs 2 (Two) Times a Day., Disp: , Rfl:   •  clopidogrel (PLAVIX) 75 MG tablet, Take 1 tablet by mouth Daily., Disp: 90 tablet, Rfl: 3  •  diltiaZEM CD (CARDIZEM CD) 180 MG 24 hr capsule, Take 1 capsule by mouth Daily., Disp: 90 capsule, Rfl: 3  •  gabapentin (NEURONTIN) 600 MG tablet, Take 600 mg by mouth 3 (three) times a day., Disp: , Rfl:   •  lamoTRIgine (LaMICtal) 200 MG tablet, Take 200 mg by mouth daily., Disp: , Rfl:   •  lisinopril (PRINIVIL,ZESTRIL) 20 MG tablet, Take 1 tablet  "by mouth Daily., Disp: 90 tablet, Rfl: 3  •  Multiple Vitamins-Minerals (MULTIVITAMIN WITH MINERALS) tablet tablet, Take 1 tablet by mouth Daily., Disp: , Rfl:   •  Omega-3 Fatty Acids (FISH OIL) 1000 MG capsule capsule, Take  by mouth daily with breakfast., Disp: , Rfl:   •  rosuvastatin (CRESTOR) 20 MG tablet, Take 1 tablet by mouth Daily., Disp: 90 tablet, Rfl: 3  •  tamsulosin (FLOMAX) 0.4 MG capsule 24 hr capsule, Take 1 capsule by mouth Every Night., Disp: 90 capsule, Rfl: 3  •  vitamin C (ASCORBIC ACID) 500 MG tablet, Take 500 mg by mouth 2 (two) times a day., Disp: , Rfl:   •  mupirocin (BACTROBAN) 2 % cream, Apply  topically to the appropriate area as directed 3 (Three) Times a Day., Disp: 30 g, Rfl: 1  •  nitroglycerin (NITROSTAT) 0.4 MG SL tablet, 1 under the tongue as needed for angina, may repeat q5mins for up three doses, Disp: 25 tablet, Rfl: 12    Physical Exam:  Vitals:    06/22/20 1017   BP: 110/60   Pulse: 62   SpO2: 95%   Weight: 76.7 kg (169 lb)   Height: 175.3 cm (69\")   PainSc: 0-No pain     Current Pain Level: none  Pulse Ox: Normal  on room air  General: alert, appears stated age and cooperative     Body Habitus: well-nourished    HEENT: Head: Normocephalic, no lesions, without obvious abnormality. No arcus senilis, xanthelasma or xanthomas.    Neuro: alert, oriented x3  Pulses: 2+ and symmetric  JVP: Volume/Pulsation: Normal.  Normal waveforms.   Appropriate inspiratory decrease.  No Kussmaul's. No Rupal's.   Carotid Exam: no bruit normal pulsation bilaterally   Carotid Volume: normal.     Respirations: no increased work of breathing   Chest:  Normal    Pulmonary:Normal   Precordium: Normal impulses. P2 is not palpable.  RV Heave: absent  LV Heave: absent  [place]:  normal size and placement  Palpable S4: absent.  Heart rate: normal    Heart Rhythm: regular   Systolic murmur audible in aortic area    Heart Sounds: S1: normal  S2: normal  S3: absent   S4: absent  Ejection systolic murmur " audible.   Pericardial Rub:  Absent: .    Abdomen:   Appearance: normal .  Palpation: Soft, non-tender to palpation, bowel sounds positive in all four quadrants; no guarding or rebound tenderness  Extremity: no edema.   LE Skin: no rashes  LE Hair:  normal  LE Pulses: well perfused with normal pulses in the distal extremities  Pallor on elevation: Absent. Rubor on dependency: None      DATA REVIEWED:     EKG. I personally reviewed and interpreted the EKG.  Sinus rhythm with nonspecific ST-T changes.      ECG/EMG Results (all)     None        ---------------------------------------------------  TTE/ADAIR:  Results for orders placed during the hospital encounter of 09/11/19   Adult Transthoracic Echo Complete W/ Cont if Necessary Per Protocol    Narrative · Estimated EF appears to be in the range of 51 - 55%  · Left ventricular wall thickness is consistent with mild-to-moderate   concentric hypertrophy.  · There is moderate calcification of the aortic valve.  · Moderate aortic valve stenosis is present.  · Mild-to-moderate mitral valve stenosis is present  · Left ventricular diastolic dysfunction (grade I) consistent with   impaired relaxation.            --------------------------------------------------------------------------------------------------  LABS:     The CVD Risk score (JEANETTE'Agostino, et al., 2008) failed to calculate for the following reasons:    The 2008 CVD risk score is only valid for ages 30 to 74    The patient has a prior MI, stroke, CHF, or peripheral vascular disease diagnosis         Lab Results   Component Value Date    GLUCOSE 93 11/07/2019    BUN 18 11/07/2019    CREATININE 1.08 11/07/2019    EGFRIFNONA 66 11/07/2019    BCR 16.7 11/07/2019    K 3.8 11/07/2019    CO2 24.0 11/07/2019    CALCIUM 8.7 11/07/2019    ALBUMIN 3.80 11/05/2019    AST 16 11/05/2019    ALT 13 11/05/2019     Lab Results   Component Value Date    WBC 5.70 01/20/2020    HGB 9.6 (L) 01/20/2020    HCT 29.3 (L) 01/20/2020    MCV  86.2 01/20/2020     01/20/2020     Lab Results   Component Value Date    CHOL 113 07/06/2017    CHLPL 114 05/04/2016    TRIG 57 07/06/2017    HDL 58 (L) 07/06/2017    LDL 48 07/06/2017     Lab Results   Component Value Date    TSH 1.610 11/05/2019     Lab Results   Component Value Date    CKTOTAL 69 02/22/2016    CKMB 2.8 02/22/2016    TROPONINI 1.350 (H) 02/24/2016     Lab Results   Component Value Date    HGBA1C 5.3 05/04/2016     No results found for: DDIMER  Lab Results   Component Value Date    ALT 13 11/05/2019     Lab Results   Component Value Date    HGBA1C 5.3 05/04/2016     Lab Results   Component Value Date    CREATININE 1.08 11/07/2019     Lab Results   Component Value Date    IRON 16 (L) 11/05/2019    TIBC 402 11/05/2019     Lab Results   Component Value Date    INR 1.03 11/05/2019    INR 0.9 08/23/2016    INR 1.0 05/04/2016    PROTIME 13.3 11/05/2019    PROTIME 12.4 08/23/2016    PROTIME 12.7 05/04/2016       Assessment/Plan     1. CAD status post CABG in the past, doing well no chest pain ,continue the Plavix as he is bruising easily.  He is on Cardizem CD for blood pressure as he cannot tolerate beta blockers due to wheezing.     2. Peripheral vascular disease-status post occlusion of the SFA has no critical limb ischemia, still walking but does have claudication With walking I asked him to walk more.And stop when the pain gets worse and walk again when the pain goes away.  His most limitations due to COPD with shortness of breath.   I thought about starting him on cilostazol but he is significant bruising as well as recurrent falls when he is working in his yard.  Patient is able to perform his activities of daily living without any limitations from claudication.     3. Hypertension well controlled with lisinopril and Cardizem CD and takes Crestor for hyperlipidemia.     4. COPD with wheezing on Atrovent and Dulera .      5.  Aortic stenosis:  Moderate aortic stenosis previously   We will  repeat echocardiogram for assessment progression of aortic stenosis.        Prevention:  Patient's Body mass index is 24.96 kg/m². BMI is above normal parameters. Recommendations include: exercise counseling.      Xander Wallace is an ex smoker    AAA Screening:   Not needed    6 months        This document has been electronically signed by Mike Montes MD on June 22, 2020 10:30

## 2020-07-20 ENCOUNTER — LAB (OUTPATIENT)
Dept: LAB | Facility: HOSPITAL | Age: 80
End: 2020-07-20

## 2020-07-20 ENCOUNTER — OFFICE VISIT (OUTPATIENT)
Dept: FAMILY MEDICINE CLINIC | Facility: CLINIC | Age: 80
End: 2020-07-20

## 2020-07-20 VITALS
HEART RATE: 63 BPM | BODY MASS INDEX: 24.65 KG/M2 | SYSTOLIC BLOOD PRESSURE: 134 MMHG | DIASTOLIC BLOOD PRESSURE: 90 MMHG | OXYGEN SATURATION: 98 % | WEIGHT: 166.44 LBS | HEIGHT: 69 IN

## 2020-07-20 DIAGNOSIS — I10 ESSENTIAL HYPERTENSION: Primary | Chronic | ICD-10-CM

## 2020-07-20 DIAGNOSIS — N18.2 CHRONIC RENAL INSUFFICIENCY, STAGE 2 (MILD): Chronic | ICD-10-CM

## 2020-07-20 DIAGNOSIS — J41.8 MIXED SIMPLE AND MUCOPURULENT CHRONIC BRONCHITIS (HCC): ICD-10-CM

## 2020-07-20 DIAGNOSIS — D64.9 SYMPTOMATIC ANEMIA: Chronic | ICD-10-CM

## 2020-07-20 LAB
ALBUMIN SERPL-MCNC: 4.2 G/DL (ref 3.5–5.2)
ALBUMIN/GLOB SERPL: 1.9 G/DL
ALP SERPL-CCNC: 78 U/L (ref 39–117)
ALT SERPL W P-5'-P-CCNC: 17 U/L (ref 1–41)
ANION GAP SERPL CALCULATED.3IONS-SCNC: 11.8 MMOL/L (ref 5–15)
AST SERPL-CCNC: 20 U/L (ref 1–40)
BILIRUB SERPL-MCNC: 0.2 MG/DL (ref 0–1.2)
BUN SERPL-MCNC: 18 MG/DL (ref 8–23)
BUN/CREAT SERPL: 12.2 (ref 7–25)
CALCIUM SPEC-SCNC: 9 MG/DL (ref 8.6–10.5)
CHLORIDE SERPL-SCNC: 104 MMOL/L (ref 98–107)
CO2 SERPL-SCNC: 24.2 MMOL/L (ref 22–29)
CREAT SERPL-MCNC: 1.47 MG/DL (ref 0.76–1.27)
GFR SERPL CREATININE-BSD FRML MDRD: 46 ML/MIN/1.73
GLOBULIN UR ELPH-MCNC: 2.2 GM/DL
GLUCOSE SERPL-MCNC: 93 MG/DL (ref 65–99)
HCT VFR BLD AUTO: 31.4 % (ref 37.5–51)
HGB BLD-MCNC: 10.7 G/DL (ref 13–17.7)
POTASSIUM SERPL-SCNC: 3.9 MMOL/L (ref 3.5–5.2)
PROT SERPL-MCNC: 6.4 G/DL (ref 6–8.5)
SODIUM SERPL-SCNC: 140 MMOL/L (ref 136–145)

## 2020-07-20 PROCEDURE — 36415 COLL VENOUS BLD VENIPUNCTURE: CPT | Performed by: FAMILY MEDICINE

## 2020-07-20 PROCEDURE — 99214 OFFICE O/P EST MOD 30 MIN: CPT | Performed by: FAMILY MEDICINE

## 2020-07-20 PROCEDURE — 80053 COMPREHEN METABOLIC PANEL: CPT | Performed by: FAMILY MEDICINE

## 2020-07-20 PROCEDURE — 85018 HEMOGLOBIN: CPT | Performed by: FAMILY MEDICINE

## 2020-07-20 PROCEDURE — 85014 HEMATOCRIT: CPT | Performed by: FAMILY MEDICINE

## 2020-07-20 RX ORDER — ALBUTEROL SULFATE 90 UG/1
2 AEROSOL, METERED RESPIRATORY (INHALATION) EVERY 4 HOURS PRN
Qty: 3 INHALER | Refills: 3 | Status: SHIPPED | OUTPATIENT
Start: 2020-07-20 | End: 2021-04-27

## 2020-07-20 NOTE — PROGRESS NOTES
Subjective   Xander Wallace is a 80 y.o. male.  Reevaluation hypertension hyperlipidemia seizure disorder COPD renal insufficiency.  In interim had his lisinopril stopped due to creatinine of 2.6 drawn elsewhere repeat today is seen to cardiology nephrology as well as pulmonology.  Continues use walker appears lost 18 pounds electively over the past 6 months we have counseled really do need to be losing any more weight because losing muscle mass.  History noted.    History of Present Illness   HPI    The following portions of the patient's history were reviewed and updated as appropriate: allergies, current medications, past family history, past medical history, past social history, past surgical history and problem list.    Review of Systems  Review of Systems   Constitutional: Negative for activity change, appetite change, fatigue and unexpected weight change.   HENT: Negative for trouble swallowing and voice change.    Eyes: Negative for redness and visual disturbance.   Respiratory: Negative for cough and wheezing.    Cardiovascular: Negative for chest pain and palpitations.   Gastrointestinal: Negative for abdominal pain, constipation, diarrhea, nausea and vomiting.   Genitourinary: Negative for urgency.   Musculoskeletal: Positive for arthralgias and back pain. Negative for joint swelling.   Neurological: Negative for syncope and headaches.   Hematological: Negative for adenopathy.   Psychiatric/Behavioral: Negative for sleep disturbance.       Objective   Physical Exam  Physical Exam   Constitutional: He is oriented to person, place, and time. He appears well-developed.   HENT:   Head: Normocephalic.   Eyes: Pupils are equal, round, and reactive to light.   Neck: Normal range of motion. No thyromegaly present.   Cardiovascular: Normal rate, regular rhythm, normal heart sounds and intact distal pulses. Exam reveals no gallop and no friction rub.   No murmur heard.  Pulmonary/Chest: Breath sounds  "normal.   Abdominal: Soft. He exhibits no distension and no mass. There is no tenderness.   Musculoskeletal: Normal range of motion.   No peripheral edema 1-2+ pulses no skin breakdown   Neurological: He is alert and oriented to person, place, and time. He has normal reflexes.   Skin: Skin is warm and dry.   Psychiatric: He has a normal mood and affect. His speech is normal. He is slowed.         Visit Vitals  /90   Pulse 63   Ht 175.3 cm (69\")   Wt 75.5 kg (166 lb 7 oz)   SpO2 98%   BMI 24.58 kg/m²     Body mass index is 24.58 kg/m².      Assessment/Plan   Xander was seen today for follow-up and hypertension.    Diagnoses and all orders for this visit:    Essential hypertension  -     Comprehensive Metabolic Panel    Chronic renal insufficiency, stage 2 (mild)  -     Comprehensive Metabolic Panel    Symptomatic anemia  -     Hemoglobin & Hematocrit, Blood    Mixed simple and mucopurulent chronic bronchitis (CMS/HCC)  -     albuterol sulfate  (90 Base) MCG/ACT inhaler; Inhale 2 puffs Every 4 (Four) Hours As Needed for Wheezing.    Counseled mainly on fall prevention infection prevention hydration following up with all subspecialist.  Counseled on flu vaccine in the fall recheck 6 months lab data  "

## 2020-08-04 ENCOUNTER — TRANSCRIBE ORDERS (OUTPATIENT)
Dept: LAB | Facility: HOSPITAL | Age: 80
End: 2020-08-04

## 2020-08-04 ENCOUNTER — LAB (OUTPATIENT)
Dept: LAB | Facility: HOSPITAL | Age: 80
End: 2020-08-04

## 2020-08-04 DIAGNOSIS — N13.8 BENIGN LOCALIZED HYPERPLASIA OF PROSTATE WITH URINARY OBSTRUCTION: Primary | ICD-10-CM

## 2020-08-04 DIAGNOSIS — N40.1 BENIGN LOCALIZED HYPERPLASIA OF PROSTATE WITH URINARY OBSTRUCTION: Primary | ICD-10-CM

## 2020-08-04 DIAGNOSIS — Z12.5 ENCOUNTER FOR SCREENING FOR MALIGNANT NEOPLASM OF PROSTATE: ICD-10-CM

## 2020-08-04 PROCEDURE — G0103 PSA SCREENING: HCPCS

## 2020-08-05 LAB — PSA SERPL-MCNC: 2.14 NG/ML (ref 0–4)

## 2020-09-21 ENCOUNTER — TELEPHONE (OUTPATIENT)
Dept: CARDIOLOGY | Facility: CLINIC | Age: 80
End: 2020-09-21

## 2020-09-21 RX ORDER — ROSUVASTATIN CALCIUM 20 MG/1
20 TABLET, COATED ORAL DAILY
Qty: 90 TABLET | Refills: 3 | Status: SHIPPED | OUTPATIENT
Start: 2020-09-21 | End: 2021-10-04

## 2020-09-21 NOTE — TELEPHONE ENCOUNTER
Last script for rosuvastatin 20 mg tabs for a 12 month supply was sent in on 9/4/19 by Dr Montes with sig of Take 1 tablet by mouth daily.    Pt was last seen on 6/22/20 and has an appt scheduled for 12/21/20.    Sent in refill request.

## 2020-12-03 LAB
BH CV ECHO MEAS - ACS: 0.8 CM
BH CV ECHO MEAS - AO MAX PG (FULL): 70.7 MMHG
BH CV ECHO MEAS - AO MAX PG: 75.7 MMHG
BH CV ECHO MEAS - AO MEAN PG (FULL): 40 MMHG
BH CV ECHO MEAS - AO MEAN PG: 42 MMHG
BH CV ECHO MEAS - AO ROOT AREA (BSA CORRECTED): 1.9
BH CV ECHO MEAS - AO ROOT AREA: 10.8 CM^2
BH CV ECHO MEAS - AO ROOT DIAM: 3.7 CM
BH CV ECHO MEAS - AO V2 MAX: 435 CM/SEC
BH CV ECHO MEAS - AO V2 MEAN: 306 CM/SEC
BH CV ECHO MEAS - AO V2 VTI: 119 CM
BH CV ECHO MEAS - ASC AORTA: 3.8 CM
BH CV ECHO MEAS - AVA(I,A): 0.64 CM^2
BH CV ECHO MEAS - AVA(I,D): 0.64 CM^2
BH CV ECHO MEAS - AVA(V,A): 0.66 CM^2
BH CV ECHO MEAS - AVA(V,D): 0.66 CM^2
BH CV ECHO MEAS - BSA(HAYCOCK): 1.9 M^2
BH CV ECHO MEAS - BSA: 1.9 M^2
BH CV ECHO MEAS - BZI_BMI: 24.5 KILOGRAMS/M^2
BH CV ECHO MEAS - BZI_METRIC_HEIGHT: 175.3 CM
BH CV ECHO MEAS - BZI_METRIC_WEIGHT: 75.3 KG
BH CV ECHO MEAS - EDV(CUBED): 166.4 ML
BH CV ECHO MEAS - EDV(MOD-SP2): 89 ML
BH CV ECHO MEAS - EDV(MOD-SP4): 92 ML
BH CV ECHO MEAS - EDV(TEICH): 147.4 ML
BH CV ECHO MEAS - EF(CUBED): 64.3 %
BH CV ECHO MEAS - EF(MOD-SP2): 57.3 %
BH CV ECHO MEAS - EF(MOD-SP4): 58.7 %
BH CV ECHO MEAS - EF(TEICH): 55.3 %
BH CV ECHO MEAS - ESV(CUBED): 59.3 ML
BH CV ECHO MEAS - ESV(MOD-SP2): 38 ML
BH CV ECHO MEAS - ESV(MOD-SP4): 38 ML
BH CV ECHO MEAS - ESV(TEICH): 65.9 ML
BH CV ECHO MEAS - FS: 29.1 %
BH CV ECHO MEAS - IVS/LVPW: 1
BH CV ECHO MEAS - IVSD: 1.3 CM
BH CV ECHO MEAS - LA DIMENSION: 4.8 CM
BH CV ECHO MEAS - LA/AO: 1.3
BH CV ECHO MEAS - LV DIASTOLIC VOL/BSA (35-75): 48.2 ML/M^2
BH CV ECHO MEAS - LV MASS(C)D: 304.3 GRAMS
BH CV ECHO MEAS - LV MASS(C)DI: 159.5 GRAMS/M^2
BH CV ECHO MEAS - LV MAX PG: 5 MMHG
BH CV ECHO MEAS - LV MEAN PG: 2 MMHG
BH CV ECHO MEAS - LV SYSTOLIC VOL/BSA (12-30): 19.9 ML/M^2
BH CV ECHO MEAS - LV V1 MAX: 112 CM/SEC
BH CV ECHO MEAS - LV V1 MEAN: 72.5 CM/SEC
BH CV ECHO MEAS - LV V1 VTI: 29.8 CM
BH CV ECHO MEAS - LVIDD: 5.5 CM
BH CV ECHO MEAS - LVIDS: 3.9 CM
BH CV ECHO MEAS - LVLD AP2: 8.1 CM
BH CV ECHO MEAS - LVLD AP4: 8.2 CM
BH CV ECHO MEAS - LVLS AP2: 7.2 CM
BH CV ECHO MEAS - LVLS AP4: 7.4 CM
BH CV ECHO MEAS - LVOT AREA (M): 2.5 CM^2
BH CV ECHO MEAS - LVOT AREA: 2.5 CM^2
BH CV ECHO MEAS - LVOT DIAM: 1.8 CM
BH CV ECHO MEAS - LVPWD: 1.3 CM
BH CV ECHO MEAS - MR MAX PG: 53.3 MMHG
BH CV ECHO MEAS - MR MAX VEL: 365 CM/SEC
BH CV ECHO MEAS - MV A MAX VEL: 173 CM/SEC
BH CV ECHO MEAS - MV DEC SLOPE: 287 CM/SEC^2
BH CV ECHO MEAS - MV E MAX VEL: 172 CM/SEC
BH CV ECHO MEAS - MV E/A: 0.99
BH CV ECHO MEAS - MV MAX PG: 11.3 MMHG
BH CV ECHO MEAS - MV MEAN PG: 6.3 MMHG
BH CV ECHO MEAS - MV P1/2T MAX VEL: 144 CM/SEC
BH CV ECHO MEAS - MV P1/2T: 147 MSEC
BH CV ECHO MEAS - MV V2 MAX: 168.3 CM/SEC
BH CV ECHO MEAS - MV V2 MEAN: 118 CM/SEC
BH CV ECHO MEAS - MV V2 VTI: 57.6 CM
BH CV ECHO MEAS - MVA P1/2T LCG: 1.5 CM^2
BH CV ECHO MEAS - MVA(P1/2T): 1.5 CM^2
BH CV ECHO MEAS - MVA(VTI): 1.3 CM^2
BH CV ECHO MEAS - PA MAX PG: 7.7 MMHG
BH CV ECHO MEAS - PA MEAN PG: 4 MMHG
BH CV ECHO MEAS - PA V2 MAX: 139 CM/SEC
BH CV ECHO MEAS - PA V2 MEAN: 89.3 CM/SEC
BH CV ECHO MEAS - PA V2 VTI: 31.4 CM
BH CV ECHO MEAS - RVDD: 4 CM
BH CV ECHO MEAS - SI(AO): 670.5 ML/M^2
BH CV ECHO MEAS - SI(CUBED): 56.1 ML/M^2
BH CV ECHO MEAS - SI(LVOT): 39.7 ML/M^2
BH CV ECHO MEAS - SI(MOD-SP2): 26.7 ML/M^2
BH CV ECHO MEAS - SI(MOD-SP4): 28.3 ML/M^2
BH CV ECHO MEAS - SI(TEICH): 42.7 ML/M^2
BH CV ECHO MEAS - SV(AO): 1280 ML
BH CV ECHO MEAS - SV(CUBED): 107.1 ML
BH CV ECHO MEAS - SV(LVOT): 75.8 ML
BH CV ECHO MEAS - SV(MOD-SP2): 51 ML
BH CV ECHO MEAS - SV(MOD-SP4): 54 ML
BH CV ECHO MEAS - SV(TEICH): 81.5 ML
BH CV ECHO MEAS - TR MAX VEL: 276 CM/SEC
BH CV VAS BP LEFT ARM: NORMAL MMHG

## 2020-12-04 ENCOUNTER — TELEPHONE (OUTPATIENT)
Dept: CARDIOLOGY | Facility: HOSPITAL | Age: 80
End: 2020-12-04

## 2020-12-04 NOTE — TELEPHONE ENCOUNTER
Mike Montes MD Brown, Karen M, RN             Aortic valve severity has worsened, probably will need TAVR.      Patient's wife was made aware of the above and was discussed briefly. [facility] and [facility] was ok with this

## 2021-01-07 ENCOUNTER — EPISODE CHANGES (OUTPATIENT)
Dept: CASE MANAGEMENT | Facility: OTHER | Age: 81
End: 2021-01-07

## 2021-01-07 ENCOUNTER — PATIENT OUTREACH (OUTPATIENT)
Dept: CASE MANAGEMENT | Facility: OTHER | Age: 81
End: 2021-01-07

## 2021-01-07 NOTE — OUTREACH NOTE
Care Coordination Assessment    Documented/Reviewed By: Sarah Girard RN Date/time: 1/7/2021 11:19 AM   Assessment completed with: spouse or significant other  Enrolled in care management program: No  Living arrangement: spouse  Support system: children, spouse  Type of residence: private residence  Home care services: No  Equipment used at home: walker, oxygen/respiratory treatment (Comment: cpap, nebulizer, BP monitor)  Communication device: Yes  Bed or wheelchair confined: No  Inadequate nutrition: No  Medication adherence problem: No  Experiencing side effects from current medications: No  History of fall(s) in last 6 months: Yes (Comment: one fall last month per wife)  Difficulty keeping appointments: No

## 2021-01-07 NOTE — OUTREACH NOTE
Care Plan Note      Responses   Annual Wellness Visit:   -- [scheduled with Dr Miller in March per wife]   Care Gaps Addressed  Colon Cancer Screening, Flu Shot, Pneumonia Vaccine   Colon Cancer Screening Type  Colonoscopy   Colonoscopy Status  Up to Date (< 10 yrs)   Flu Shot Status  Up to Date   Flu Shot Completion at [facility] or Other  Other   Other Location  [facility] per wife   Pneumonia Vaccine Status  Up to Date   Specific Disease Process Teaching  COPD, Hypertension   Other Patient Education/Resources   24/7 [facility] Nurse Call Line, [facility]   24/7 Nurse Call Line Education Method  Verbal   MyChart Education Method  Verbal   Does patient have depression diagnosis?  No   Advanced Directives:  Not Interested At This Time   Ed Visits past 12 months:  None   Hospitalizations past 12 months  1   Medication Adherence  Medications understood        The main concerns and/or symptoms the patient would like to address are: ACM outreach call made to pt on dual eligible beneficiary list, spoke with pt's wife. Explained role of ACM. Wife denies any pt symptoms or concerns at this time. Wife states pt still drives, functions independently, occasionally needs help with dressing due to shoulder pain, uses walker.    Education/instruction provided by Care Coordinator: reviewed with pt: disease education regarding COPD, HTN; BP monitoring; safety/fall precautions; 24/7 nurse line; ACM contact info; care gaps; adv dir and mychart- wife declines; SDOH- wife denies food, medication, or transportation insecurities. Wife states they have support from their dght. Wife states pt has appointments scheduled with his pcp, cardiologist, nephrologist, pulmonologist and have them written on a calender. Wife states pt's AWV is scheduled in March with his pulmonologist Dr Millre who did it the last time, counseled wife about asking pcp at next appt if he would like to complete pt's AWV as it is usually done by pcp. No questions  or concerns per wife. Pt exhibits strong sense of health self-management and adequate support system. Wife appreciates the call and declines need for further outreach. Advised wife to call with any needs. Follow up outreach as needed.    Follow Up Outreach Due: as needed    Sarah Girard RN  Ambulatory     1/7/2021, 11:27 EST

## 2021-01-21 ENCOUNTER — OFFICE VISIT (OUTPATIENT)
Dept: FAMILY MEDICINE CLINIC | Facility: CLINIC | Age: 81
End: 2021-01-21

## 2021-01-21 ENCOUNTER — LAB (OUTPATIENT)
Dept: LAB | Facility: HOSPITAL | Age: 81
End: 2021-01-21

## 2021-01-21 VITALS
BODY MASS INDEX: 24.76 KG/M2 | SYSTOLIC BLOOD PRESSURE: 126 MMHG | HEIGHT: 69 IN | WEIGHT: 167.2 LBS | DIASTOLIC BLOOD PRESSURE: 82 MMHG

## 2021-01-21 DIAGNOSIS — J43.9 PULMONARY EMPHYSEMA, UNSPECIFIED EMPHYSEMA TYPE (HCC): ICD-10-CM

## 2021-01-21 DIAGNOSIS — N18.31 STAGE 3A CHRONIC KIDNEY DISEASE (HCC): ICD-10-CM

## 2021-01-21 DIAGNOSIS — I25.708 CORONARY ARTERY DISEASE INVOLVING CORONARY BYPASS GRAFT OF NATIVE HEART WITH OTHER FORMS OF ANGINA PECTORIS (HCC): Chronic | ICD-10-CM

## 2021-01-21 DIAGNOSIS — Z00.00 MEDICARE ANNUAL WELLNESS VISIT, INITIAL: ICD-10-CM

## 2021-01-21 DIAGNOSIS — D50.0 IRON DEFICIENCY ANEMIA DUE TO CHRONIC BLOOD LOSS: Chronic | ICD-10-CM

## 2021-01-21 DIAGNOSIS — G40.909 SEIZURE DISORDER (HCC): Chronic | ICD-10-CM

## 2021-01-21 DIAGNOSIS — I10 ESSENTIAL HYPERTENSION: Primary | Chronic | ICD-10-CM

## 2021-01-21 DIAGNOSIS — I73.9 PERIPHERAL VASCULAR DISEASE, UNSPECIFIED (HCC): ICD-10-CM

## 2021-01-21 LAB
HCT VFR BLD AUTO: 34.4 % (ref 37.5–51)
HGB BLD-MCNC: 11.6 G/DL (ref 13–17.7)

## 2021-01-21 PROCEDURE — 85018 HEMOGLOBIN: CPT | Performed by: FAMILY MEDICINE

## 2021-01-21 PROCEDURE — 36415 COLL VENOUS BLD VENIPUNCTURE: CPT | Performed by: FAMILY MEDICINE

## 2021-01-21 PROCEDURE — 99214 OFFICE O/P EST MOD 30 MIN: CPT | Performed by: FAMILY MEDICINE

## 2021-01-21 PROCEDURE — 85014 HEMATOCRIT: CPT | Performed by: FAMILY MEDICINE

## 2021-01-21 RX ORDER — CARVEDILOL 6.25 MG/1
3.12 TABLET ORAL
COMMUNITY
Start: 2020-10-24 | End: 2021-06-04 | Stop reason: ALTCHOICE

## 2021-01-21 RX ORDER — PREDNISONE 10 MG/1
TABLET ORAL
COMMUNITY
Start: 2021-01-19 | End: 2021-02-22

## 2021-01-21 RX ORDER — LOSARTAN POTASSIUM 25 MG/1
25 TABLET ORAL DAILY
COMMUNITY
Start: 2020-11-18

## 2021-01-21 RX ORDER — TRIAMCINOLONE ACETONIDE 5 MG/G
CREAM TOPICAL 3 TIMES DAILY
COMMUNITY
End: 2021-01-21

## 2021-01-21 NOTE — PROGRESS NOTES
The ABCs of the Annual Wellness Visit  Initial Medicare Wellness Visit    Chief Complaint   Patient presents with   • Hypertension     6 month reval       Subjective   History of Present Illness:  Xander Wallace is a 80 y.o. male who presents for an Initial Medicare Wellness Visit.    HEALTH RISK ASSESSMENT    Recent Hospitalizations:  No hospitalization(s) within the last year.    Current Medical Providers:  Patient Care Team:  Deni Garcia MD as PCP - General (Family Medicine)  Homero Pearl MD as Surgeon (General Surgery)  Judith Conti MD as Consulting Physician (Gastroenterology)    Smoking Status:  Social History     Tobacco Use   Smoking Status Former Smoker   Smokeless Tobacco Never Used       Alcohol Consumption:  Social History     Substance and Sexual Activity   Alcohol Use No       Depression Screen:   PHQ-2/PHQ-9 Depression Screening 1/21/2021   Little interest or pleasure in doing things 0   Feeling down, depressed, or hopeless 0   Total Score 0       Fall Risk Screen:  STANLEYADI Fall Risk Assessment was completed, and patient is at HIGH risk for falls. Assessment completed on:1/21/2021    Health Habits and Functional and Cognitive Screening:  No flowsheet data found.      Does the patient have evidence of cognitive impairment? No    Asprin use counseling:Taking ASA appropriately as indicated    Age-appropriate Screening Schedule:  Refer to the list below for future screening recommendations based on patient's age, sex and/or medical conditions. Orders for these recommended tests are listed in the plan section. The patient has been provided with a written plan.    Health Maintenance   Topic Date Due   • LIPID PANEL  02/26/2021 (Originally 7/6/2018)   • COLONOSCOPY  11/06/2022   • INFLUENZA VACCINE  Completed   • HEMOGLOBIN A1C  Discontinued   • DIABETIC EYE EXAM  Discontinued   • URINE MICROALBUMIN  Discontinued   • TDAP/TD VACCINES  Discontinued   • ZOSTER VACCINE  Discontinued           The following portions of the patient's history were reviewed and updated as appropriate: allergies, current medications, past family history, past medical history, past social history, past surgical history and problem list.    Outpatient Medications Prior to Visit   Medication Sig Dispense Refill   • albuterol sulfate  (90 Base) MCG/ACT inhaler Inhale 2 puffs Every 4 (Four) Hours As Needed for Wheezing. 3 inhaler 3   • B Complex-Biotin-FA (SUPER B-COMPLEX) tablet Take  by mouth.     • budesonide-formoterol (SYMBICORT) 160-4.5 MCG/ACT inhaler Inhale 2 puffs 2 (Two) Times a Day.     • carvedilol (COREG) 6.25 MG tablet      • clopidogrel (PLAVIX) 75 MG tablet Take 1 tablet by mouth Daily. 90 tablet 3   • gabapentin (NEURONTIN) 600 MG tablet Take 600 mg by mouth 3 (three) times a day.     • lamoTRIgine (LaMICtal) 200 MG tablet Take 200 mg by mouth daily.     • losartan (COZAAR) 25 MG tablet      • Multiple Vitamins-Minerals (MULTIVITAMIN WITH MINERALS) tablet tablet Take 1 tablet by mouth Daily.     • mupirocin (BACTROBAN) 2 % cream Apply  topically to the appropriate area as directed 3 (Three) Times a Day. 30 g 1   • nitroglycerin (NITROSTAT) 0.4 MG SL tablet 1 under the tongue as needed for angina, may repeat q5mins for up three doses 25 tablet 12   • Omega-3 Fatty Acids (FISH OIL) 1000 MG capsule capsule Take  by mouth daily with breakfast.     • predniSONE (DELTASONE) 10 MG tablet      • rosuvastatin (CRESTOR) 20 MG tablet Take 1 tablet by mouth Daily for 360 days. 90 tablet 3   • tamsulosin (FLOMAX) 0.4 MG capsule 24 hr capsule Take 1 capsule by mouth Every Night. 90 capsule 3   • vitamin C (ASCORBIC ACID) 500 MG tablet Take 500 mg by mouth 2 (two) times a day.     • triamcinolone (KENALOG) 0.5 % cream Apply  topically to the appropriate area as directed 3 (Three) Times a Day.       No facility-administered medications prior to visit.        Patient Active Problem List   Diagnosis   • Seizure disorder  "(CMS/[facility])   • Hypertension   • Coronary artery disease involving coronary bypass graft   • COPD (chronic obstructive pulmonary disease) (CMS/[facility])   • Hyperlipidemia   • Peripheral arterial disease (CMS/[facility])   • Sleep apnea   • Asthma   • Benign prostatic hyperplasia with lower urinary tract symptoms   • Chronic renal insufficiency, stage 2 (mild)   • Coronary artery disease   • Cardiac murmur   • Symptomatic anemia   • Iron deficiency anemia due to chronic blood loss   • Stage 3a chronic kidney disease       Advanced Care Planning:  ACP discussion was held with the patient during this visit. Patient does not have an advance directive, information provided.    Review of Systems    Compared to one year ago, the patient feels his physical health is the same.  Compared to one year ago, the patient feels his mental health is the same.    Reviewed chart for potential of high risk medication in the elderly: yes  Reviewed chart for potential of harmful drug interactions in the elderly:yes    Objective         Vitals:    01/21/21 0958   BP: 126/82   Weight: 75.8 kg (167 lb 3.2 oz)   Height: 175.3 cm (69\")   PainSc: 0-No pain       Body mass index is 24.69 kg/m².  Discussed the patient's BMI with him. The BMI is in the acceptable range.    Physical Exam          Assessment/Plan   Medicare Risks and Personalized Health Plan  CMS Preventative Services Quick Reference  Advance Directive Discussion  Fall Risk  Immunizations Discussed/Encouraged (specific immunizations; Influenza )  Covid    The above risks/problems have been discussed with the patient.  Pertinent information has been shared with the patient in the After Visit Summary.  Follow up plans and orders are seen below in the Assessment/Plan Section.    Diagnoses and all orders for this visit:    1. Essential hypertension (Primary)    2. Coronary artery disease involving coronary bypass graft of native heart with other forms of angina pectoris (CMS/[facility])    3. Iron " deficiency anemia due to chronic blood loss  -     Hemoglobin & Hematocrit, Blood    4. Seizure disorder (CMS/HCC)    5. Peripheral vascular disease, unspecified (CMS/HCC)    6. Stage 3a chronic kidney disease    7. Pulmonary emphysema, unspecified emphysema type (CMS/[facility])    8. Medicare annual wellness visit, initial      Follow Up:  No follow-ups on file.     An After Visit Summary and PPPS were given to the patient.

## 2021-01-21 NOTE — PROGRESS NOTES
Subjective   Xandercarrol Wallace is a 80 y.o. male.  Valuation hypertension coronary disease mild renal insufficiency history of seizure disorder COPD iron deficiency anemia chronic.  In interim is been to see all subspecialist.  Last creatinine holding steady 1.4 hemoglobin holding steady and hematocrit as well.  Feels well otherwise still using a walker all the time.  No seizure activity.  Has had a flu vaccine is signing up for Covid vaccine.  History noted.    History of Present Illness   HPI    The following portions of the patient's history were reviewed and updated as appropriate: allergies, current medications, past family history, past medical history, past social history, past surgical history and problem list.    Review of Systems  Review of Systems   Constitutional: Negative for activity change, appetite change, fatigue and unexpected weight change.   HENT: Negative for trouble swallowing and voice change.    Eyes: Negative for redness and visual disturbance.   Respiratory: Negative for cough and wheezing.    Cardiovascular: Negative for chest pain and palpitations.   Gastrointestinal: Negative for abdominal pain, constipation, diarrhea, nausea and vomiting.   Genitourinary: Negative for urgency.   Musculoskeletal: Positive for arthralgias. Negative for joint swelling.   Neurological: Negative for syncope and headaches.   Hematological: Negative for adenopathy.   Psychiatric/Behavioral: Negative for sleep disturbance.       Objective   Physical Exam  Physical Exam  Constitutional:       Appearance: He is well-developed.   HENT:      Head: Normocephalic.      Right Ear: External ear normal.   Eyes:      Pupils: Pupils are equal, round, and reactive to light.   Neck:      Musculoskeletal: Normal range of motion.      Thyroid: No thyromegaly.   Cardiovascular:      Rate and Rhythm: Normal rate and regular rhythm.      Heart sounds: Normal heart sounds. No murmur. No friction rub. No gallop.   "  Pulmonary:      Breath sounds: Normal breath sounds.   Abdominal:      General: There is no distension.      Palpations: Abdomen is soft. There is no mass.      Tenderness: There is no abdominal tenderness.   Musculoskeletal: Normal range of motion.      Comments: No peripheral edema 2+ pulses get up and go 3 seconds using a walker all the time.   Skin:     General: Skin is warm and dry.   Neurological:      Mental Status: He is alert and oriented to person, place, and time.      Deep Tendon Reflexes: Reflexes are normal and symmetric.   Psychiatric:         Attention and Perception: Attention normal.         Mood and Affect: Mood normal.         Speech: Speech normal.         Behavior: Behavior normal.         Cognition and Memory: Cognition normal.           Visit Vitals  /82   Ht 175.3 cm (69\")   Wt 75.8 kg (167 lb 3.2 oz)   BMI 24.69 kg/m²     Body mass index is 24.69 kg/m².      Assessment/Plan   Diagnoses and all orders for this visit:    1. Essential hypertension (Primary)    2. Coronary artery disease involving coronary bypass graft of native heart with other forms of angina pectoris (CMS/HCC)    3. Iron deficiency anemia due to chronic blood loss  -     Hemoglobin & Hematocrit, Blood    4. Seizure disorder (CMS/HCC)    5. Peripheral vascular disease, unspecified (CMS/HCC)    6. Stage 3a chronic kidney disease    7. Pulmonary emphysema, unspecified emphysema type (CMS/HCC)    Counseled mainly fall prevention infection prevention continue all medications as outlined.  Continue seeing all subspecialist.  Recheck again in 6 months.  Lab today.  "

## 2021-02-22 ENCOUNTER — OFFICE VISIT (OUTPATIENT)
Dept: CARDIOLOGY | Facility: CLINIC | Age: 81
End: 2021-02-22

## 2021-02-22 VITALS — BODY MASS INDEX: 23.99 KG/M2 | HEIGHT: 69 IN | WEIGHT: 162 LBS

## 2021-02-22 DIAGNOSIS — I25.10 CORONARY ARTERY DISEASE DUE TO LIPID RICH PLAQUE: ICD-10-CM

## 2021-02-22 DIAGNOSIS — I25.83 CORONARY ARTERY DISEASE DUE TO LIPID RICH PLAQUE: ICD-10-CM

## 2021-02-22 DIAGNOSIS — E78.2 HYPERLIPEMIA, MIXED: ICD-10-CM

## 2021-02-22 DIAGNOSIS — I10 HYPERTENSION, ESSENTIAL: Primary | ICD-10-CM

## 2021-02-22 PROCEDURE — 99442 PR PHYS/QHP TELEPHONE EVALUATION 11-20 MIN: CPT | Performed by: INTERNAL MEDICINE

## 2021-02-22 NOTE — PROGRESS NOTES
[facility] Cardiology  OFFICE NOTE    Cardiovascular Medicine  Mike Montes M.D., RPVI         No referring provider defined for this encounter.    Thank you for asking me to see Xander Chandan Wallace for CAD.  You have chosen to receive care through a telephone visit. Do you consent to use a telephone visit for your medical care today? Yes  This visit has been rescheduled as a phone visit to comply with patient safety concerns in accordance with CDC recommendations. Total time of discussion was 13 minutes.          History of Present Illness  This is a 80 y.o. male with:    78 y.o. male     12/18/2018  1. Coronary artery disease involving coronary bypass graft of native heart without angina pectoris    2. Chronic renal insufficiency, stage 2 (mild)    3. Mixed hyperlipidemia    4. Peripheral arterial disease (CMS/HCC)    5. Essential hypertension          Chief complaint -Follow-up CAD        History of present Illness- 80 yr old gentleman with history of coronary disease prior CABG in the past and it has history of peripheral vascular disease with total occlusion of the SFA.  Patient had failed attempted PTA of the right SFA with antegrade retrograde approach. He has claudication pain With walking  2-3 blocks, but no rest pain but does not want to do any PTA at this time and he will continue to walk as much as he can.  He denies any chest pain.  He has shortness of breath secondary to COPD and is seeing a pulmonologist in [place].  He is on multiple inhalers.  He denies any TIA symptoms of GI symptoms.He stays active as much as he can  and he gets around with a walking stick.  He denies any CNS symptoms.  He had frequent falls however those falls in setting of tripping.  Denied any chest pain.    06/22/2020:  No acute events since last visit.  Patient COPD exacerbation back in March however his inhalers have been adjusted with improvement in his breathing.  He continues to have  claudications but unchanged from before and is not affecting his quality of life.  Denying any chest pain.    02/22/2021:  He continues to have shortness of breath on exertion.  He is denying any claudications currently though.  No chest pain.      Review of Systems - ROS  Constitution: Negative for weakness, weight gain and weight loss.   HENT: Negative for congestion.    Eyes: Negative for blurred vision.   Cardiovascular: As mentioned above  Respiratory: Negative for cough and hemoptysis.    Endocrine: Negative for polydipsia and polyuria.   Hematologic/Lymphatic: Negative for bleeding problem. Does not bruise/bleed easily.   Skin: Negative for flushing.   Musculoskeletal: Negative for neck pain and stiffness.   Gastrointestinal: Negative for abdominal pain, diarrhea, jaundice, melena, nausea and vomiting.   Genitourinary: Negative for dysuria and hematuria.   Neurological: Negative for dizziness, focal weakness and numbness.   Psychiatric/Behavioral: Negative for altered mental status and depression.          All other systems were reviewed and were negative.    family history includes Cancer in his father.     reports that he has quit smoking. He has never used smokeless tobacco. He reports that he does not drink alcohol or use drugs.    Allergies   Allergen Reactions   • Doxycycline GI Intolerance   • Methylphenidate Derivatives    • Sulfur GI Intolerance     Stomach issues and rash    • Theophyllines Unknown - Low Severity     Pt states he didn't remember        • Amoxicillin Rash   • Ceftin [Cefuroxime] Rash   • Nystatin Rash         Current Outpatient Medications:   •  albuterol sulfate  (90 Base) MCG/ACT inhaler, Inhale 2 puffs Every 4 (Four) Hours As Needed for Wheezing., Disp: 3 inhaler, Rfl: 3  •  B Complex-Biotin-FA (SUPER B-COMPLEX) tablet, Take  by mouth., Disp: , Rfl:   •  budesonide-formoterol (SYMBICORT) 160-4.5 MCG/ACT inhaler, Inhale 2 puffs 2 (Two) Times a Day., Disp: , Rfl:   •   "carvedilol (COREG) 6.25 MG tablet, 3.125 mg. Patient takes 3.125 twice daily, Disp: , Rfl:   •  clopidogrel (PLAVIX) 75 MG tablet, Take 1 tablet by mouth Daily., Disp: 90 tablet, Rfl: 3  •  gabapentin (NEURONTIN) 600 MG tablet, Take 600 mg by mouth 3 (three) times a day., Disp: , Rfl:   •  lamoTRIgine (LaMICtal) 200 MG tablet, Take 200 mg by mouth 2 (Two) Times a Day., Disp: , Rfl:   •  losartan (COZAAR) 25 MG tablet, , Disp: , Rfl:   •  Multiple Vitamins-Minerals (MULTIVITAMIN WITH MINERALS) tablet tablet, Take 1 tablet by mouth Daily., Disp: , Rfl:   •  mupirocin (BACTROBAN) 2 % cream, Apply  topically to the appropriate area as directed 3 (Three) Times a Day., Disp: 30 g, Rfl: 1  •  nitroglycerin (NITROSTAT) 0.4 MG SL tablet, 1 under the tongue as needed for angina, may repeat q5mins for up three doses, Disp: 25 tablet, Rfl: 12  •  Omega-3 Fatty Acids (FISH OIL) 1000 MG capsule capsule, Take  by mouth daily with breakfast., Disp: , Rfl:   •  rosuvastatin (CRESTOR) 20 MG tablet, Take 1 tablet by mouth Daily for 360 days., Disp: 90 tablet, Rfl: 3  •  tamsulosin (FLOMAX) 0.4 MG capsule 24 hr capsule, Take 1 capsule by mouth Every Night., Disp: 90 capsule, Rfl: 3  •  vitamin C (ASCORBIC ACID) 500 MG tablet, Take 500 mg by mouth 2 (two) times a day., Disp: , Rfl:     Physical Exam:  Vitals:    02/22/21 1146   BP: Comment: unable to take at home   Weight: 73.5 kg (162 lb)   Height: 175.3 cm (69.02\")   PainSc: 0-No pain     Unable to perform since tele visit      DATA REVIEWED:     EKG. I personally reviewed and interpreted the EKG.  Sinus rhythm with nonspecific ST-T changes.      ECG/EMG Results (all)     None        ---------------------------------------------------  TTE/ADAIR:  Results for orders placed in visit on 06/22/20   Adult Transthoracic Echo Complete W/ Cont if Necessary Per Protocol    Narrative · Left ventricular ejection fraction appears to be 56 - 60%. Left   ventricular systolic function is normal.  · " Left ventricular diastolic function is consistent with (grade II w/high   LAP) pseudonormalization.  · Left ventricular wall thickness is consistent with concentric   hypertrophy.  · Estimated right ventricular systolic pressure from tricuspid   regurgitation is normal (<35 mmHg).  · Left atrial volume is moderately increased.  · Moderate mitral annular calcification is present. Mild mitral valve   regurgitation is present. Mild mitral valve stenosis is present with   functional MAC.  · The aortic valve is abnormal in structure. There is moderate   calcification of the aortic valve mainly affecting the non-coronary, left   coronary and right coronary cusp(s). The aortic valve appears trileaflet.   No aortic valve regurgitation is present. Moderate restriction to aortic   valve opening. Aortic valve peak/mean gradient of 76/42mmHg. SHAHIDA of   0.64cm2 by continuity findings c/w severe aortic stenosis.            --------------------------------------------------------------------------------------------------  LABS:     The CVD Risk score (JEANETTE'Agostino, et al., 2008) failed to calculate for the following reasons:    The 2008 CVD risk score is only valid for ages 30 to 74    The patient has a prior MI, stroke, CHF, or peripheral vascular disease diagnosis         Lab Results   Component Value Date    GLUCOSE 93 07/20/2020    BUN 18 07/20/2020    CREATININE 1.47 (H) 07/20/2020    EGFRIFNONA 46 (L) 07/20/2020    BCR 12.2 07/20/2020    K 3.9 07/20/2020    CO2 24.2 07/20/2020    CALCIUM 9.0 07/20/2020    ALBUMIN 4.20 07/20/2020    AST 20 07/20/2020    ALT 17 07/20/2020     Lab Results   Component Value Date    WBC 5.70 01/20/2020    HGB 11.6 (L) 01/21/2021    HCT 34.4 (L) 01/21/2021    MCV 86.2 01/20/2020     01/20/2020     Lab Results   Component Value Date    CHOL 113 07/06/2017    CHLPL 114 05/04/2016    TRIG 57 07/06/2017    HDL 58 (L) 07/06/2017    LDL 48 07/06/2017     Lab Results   Component Value Date    TSH  1.610 11/05/2019     Lab Results   Component Value Date    CKTOTAL 69 02/22/2016    CKMB 2.8 02/22/2016    TROPONINI 1.350 (H) 02/24/2016     Lab Results   Component Value Date    HGBA1C 5.3 05/04/2016     No results found for: DDIMER  Lab Results   Component Value Date    ALT 17 07/20/2020     Lab Results   Component Value Date    HGBA1C 5.3 05/04/2016     Lab Results   Component Value Date    CREATININE 1.47 (H) 07/20/2020     Lab Results   Component Value Date    IRON 16 (L) 11/05/2019    TIBC 402 11/05/2019     Lab Results   Component Value Date    INR 1.03 11/05/2019    INR 0.9 08/23/2016    INR 1.0 05/04/2016    PROTIME 13.3 11/05/2019    PROTIME 12.4 08/23/2016    PROTIME 12.7 05/04/2016       Assessment/Plan     1. CAD status post CABG in the past, doing well no chest pain ,continue the Plavix as he is bruising easily.  He is on plavix/coreg/statin     2. Peripheral vascular disease-status post occlusion of the SFA has no critical limb ischemia, still walking but does have claudication With walking I asked him to walk more.And stop when the pain gets worse and walk again when the pain goes away.  His most limitations due to COPD with shortness of breath.   I thought about starting him on cilostazol but he is significant bruising as well as recurrent falls when he is working in his yard.  Patient is able to perform his activities of daily living without any limitations from claudication.     3. Hypertension well controlled with lisinopril and coreg and takes Crestor for hyperlipidemia.     4. COPD with wheezing on Atrovent and Dulera .      5.  Severe Aortic stenosis:  Repeat echo with severe AS. I discussed options with him and they are agreeable for referral for TAVR.         Prevention:  Patient's Body mass index is 23.91 kg/m². BMI is above normal parameters. Recommendations include: exercise counseling.      Xanderlong Wallace is an ex smoker    AAA Screening:   Not needed            This document  has been electronically signed by Mike Montes MD on February 22, 2021 11:52 CST

## 2021-02-23 ENCOUNTER — TELEPHONE (OUTPATIENT)
Dept: CARDIOLOGY | Facility: CLINIC | Age: 81
End: 2021-02-23

## 2021-02-23 NOTE — TELEPHONE ENCOUNTER
Mike Montes MD Brown, Karen M RN             Sure. Do you mind calling the wife, she might be ok with [facility], as long as its covered.    Previous Messages    ----- Message -----   From: Mary Morgan RN   Sent: 2/22/2021  12:53 PM CST   To: Mike Montes MD     No its not. Im not sure who the TAVR docs are at [place]. I can ask Dr Man he probably knows.   ----- Message -----   From: Mike Montes MD   Sent: 2/22/2021  12:07 PM CST   To: Mary Morgan RN     They are agreeable for TAVR and wants to go to [facility] if it will be covered.   Is [facility] and [facility] the [facility]?         Tried to call wife to discuss the above. Left message

## 2021-02-24 DIAGNOSIS — I35.0 NONRHEUMATIC AORTIC VALVE STENOSIS: Primary | ICD-10-CM

## 2021-02-24 NOTE — PROGRESS NOTES
Mike Montes MD Brown, Karen M, RN             Sure. I would do right and left heart cath if needed    Previous Messages    ----- Message -----   From: Mary Morgan RN   Sent: 2/24/2021   1:04 PM CST   To: Mike Montes MD     I called her and they still want to go to [facility]. I asked her to get the doctors names (she wanted the doctors who did a replacement on a family member). Its doctors Mercado and Horr. Ok to place referral?   ----- Message -----   From: Mike Montes MD   Sent: 2/22/2021  12:55 PM CST   To: Mary Morgan, FESTUS     Sure. Do you mind calling the wife, she might be ok with [facility], as long as its covered.   ----- Message -----   From: Mary Morgan RN   Sent: 2/22/2021  12:53 PM CST   To: Mike Montes MD     No its not. Im not sure who the TAVR docs are at [facility]. I can ask Dr Man he probably knows.   ----- Message -----   From: Mike Montes MD   Sent: 2/22/2021  12:07 PM CST   To: Mary Morgan RN     They are agreeable for TAVR and wants to go to [facility] if it will be covered.   Is [facility] and [facility]?             referral placed

## 2021-03-16 ENCOUNTER — DOCUMENTATION (OUTPATIENT)
Dept: CARDIOLOGY | Facility: CLINIC | Age: 81
End: 2021-03-16

## 2021-03-16 DIAGNOSIS — I35.0 NONRHEUMATIC AORTIC VALVE STENOSIS: Primary | ICD-10-CM

## 2021-03-16 DIAGNOSIS — I38 PRE-OPERATIVE CARDIOVASCULAR EXAMINATION, VALVULAR HEART DISEASE: ICD-10-CM

## 2021-03-16 DIAGNOSIS — Z01.810 PRE-OPERATIVE CARDIOVASCULAR EXAMINATION, VALVULAR HEART DISEASE: ICD-10-CM

## 2021-03-16 DIAGNOSIS — I38 PRE-OPERATIVE CARDIOVASCULAR EXAMINATION, VALVULAR HEART DISEASE: Primary | ICD-10-CM

## 2021-03-16 DIAGNOSIS — Z01.810 PRE-OPERATIVE CARDIOVASCULAR EXAMINATION, VALVULAR HEART DISEASE: Primary | ICD-10-CM

## 2021-03-16 DIAGNOSIS — I35.0 NONRHEUMATIC AORTIC VALVE STENOSIS: ICD-10-CM

## 2021-03-16 RX ORDER — SODIUM CHLORIDE 9 MG/ML
100 INJECTION, SOLUTION INTRAVENOUS CONTINUOUS
Status: CANCELLED | OUTPATIENT
Start: 2021-03-24

## 2021-03-16 RX ORDER — SODIUM CHLORIDE 0.9 % (FLUSH) 0.9 %
10 SYRINGE (ML) INJECTION AS NEEDED
Status: CANCELLED | OUTPATIENT
Start: 2021-03-24

## 2021-03-16 RX ORDER — SODIUM CHLORIDE 0.9 % (FLUSH) 0.9 %
3 SYRINGE (ML) INJECTION EVERY 12 HOURS SCHEDULED
Status: CANCELLED | OUTPATIENT
Start: 2021-03-24

## 2021-03-16 NOTE — PROGRESS NOTES
Left and Right Heart cath scheduled. Patient's wife given all instructions including date time and instructions for covid test. She verbalized understanding and had no questions at this time

## 2021-03-21 ENCOUNTER — LAB (OUTPATIENT)
Dept: LAB | Facility: HOSPITAL | Age: 81
End: 2021-03-21

## 2021-03-21 DIAGNOSIS — Z01.818 PREOP TESTING: Primary | ICD-10-CM

## 2021-03-21 PROCEDURE — U0005 INFEC AGEN DETEC AMPLI PROBE: HCPCS

## 2021-03-21 PROCEDURE — C9803 HOPD COVID-19 SPEC COLLECT: HCPCS

## 2021-03-21 PROCEDURE — U0004 COV-19 TEST NON-CDC HGH THRU: HCPCS

## 2021-03-22 LAB — SARS-COV-2 ORF1AB RESP QL NAA+PROBE: NOT DETECTED

## 2021-03-24 ENCOUNTER — HOSPITAL ENCOUNTER (OUTPATIENT)
Facility: HOSPITAL | Age: 81
Setting detail: HOSPITAL OUTPATIENT SURGERY
Discharge: HOME | End: 2021-03-24
Attending: INTERNAL MEDICINE | Admitting: INTERNAL MEDICINE

## 2021-03-24 VITALS
HEIGHT: 69 IN | DIASTOLIC BLOOD PRESSURE: 64 MMHG | RESPIRATION RATE: 16 BRPM | SYSTOLIC BLOOD PRESSURE: 142 MMHG | HEART RATE: 66 BPM | WEIGHT: 165.57 LBS | OXYGEN SATURATION: 96 % | TEMPERATURE: 98.1 F | BODY MASS INDEX: 24.52 KG/M2

## 2021-03-24 DIAGNOSIS — Z01.810 PRE-OPERATIVE CARDIOVASCULAR EXAMINATION, VALVULAR HEART DISEASE: ICD-10-CM

## 2021-03-24 DIAGNOSIS — I38 PRE-OPERATIVE CARDIOVASCULAR EXAMINATION, VALVULAR HEART DISEASE: ICD-10-CM

## 2021-03-24 DIAGNOSIS — I35.0 NONRHEUMATIC AORTIC VALVE STENOSIS: ICD-10-CM

## 2021-03-24 LAB
ANION GAP SERPL CALCULATED.3IONS-SCNC: 8 MMOL/L (ref 5–15)
BUN SERPL-MCNC: 18 MG/DL (ref 8–23)
BUN/CREAT SERPL: 13.8 (ref 7–25)
CALCIUM SPEC-SCNC: 8.9 MG/DL (ref 8.6–10.5)
CHLORIDE SERPL-SCNC: 105 MMOL/L (ref 98–107)
CO2 SERPL-SCNC: 26 MMOL/L (ref 22–29)
CREAT SERPL-MCNC: 1.3 MG/DL (ref 0.76–1.27)
DEPRECATED RDW RBC AUTO: 42.5 FL (ref 37–54)
ERYTHROCYTE [DISTWIDTH] IN BLOOD BY AUTOMATED COUNT: 13.3 % (ref 12.3–15.4)
GFR SERPL CREATININE-BSD FRML MDRD: 53 ML/MIN/1.73
GLUCOSE SERPL-MCNC: 101 MG/DL (ref 65–99)
HCT VFR BLD AUTO: 32.3 % (ref 37.5–51)
HGB BLD-MCNC: 11.1 G/DL (ref 13–17.7)
INR PPP: 1.01 (ref 0.8–1.2)
MCH RBC QN AUTO: 30.2 PG (ref 26.6–33)
MCHC RBC AUTO-ENTMCNC: 34.4 G/DL (ref 31.5–35.7)
MCV RBC AUTO: 87.8 FL (ref 79–97)
OXYGEN SATURATION, PULMONARY ARTERY: 72 %
OXYGEN SATURATION, PULMONARY ARTERY: 72.6 %
OXYGEN SATURATION, RIGHT ATRIUM: 76.3 % (ref 94–100)
OXYGEN SATURATION, RIGHT VENTRICLE: 72.2 % (ref 94–100)
OXYGEN SATURATION, WEDGE: 81 % (ref 94–100)
PLATELET # BLD AUTO: 175 10*3/MM3 (ref 140–450)
PMV BLD AUTO: 9.7 FL (ref 6–12)
POTASSIUM SERPL-SCNC: 4.2 MMOL/L (ref 3.5–5.2)
PROTHROMBIN TIME: 13.7 SECONDS (ref 11.1–15.3)
RBC # BLD AUTO: 3.68 10*6/MM3 (ref 4.14–5.8)
SAO2 % BLDA: 95.9 %
SODIUM SERPL-SCNC: 139 MMOL/L (ref 136–145)
WBC # BLD AUTO: 5.99 10*3/MM3 (ref 3.4–10.8)

## 2021-03-24 PROCEDURE — C1769 GUIDE WIRE: HCPCS | Performed by: INTERNAL MEDICINE

## 2021-03-24 PROCEDURE — C1894 INTRO/SHEATH, NON-LASER: HCPCS | Performed by: INTERNAL MEDICINE

## 2021-03-24 PROCEDURE — 93461 R&L HRT ART/VENTRICLE ANGIO: CPT | Performed by: INTERNAL MEDICINE

## 2021-03-24 PROCEDURE — 82810 BLOOD GASES O2 SAT ONLY: CPT | Performed by: INTERNAL MEDICINE

## 2021-03-24 PROCEDURE — 25010000002 HEPARIN (PORCINE) PER 1000 UNITS: Performed by: INTERNAL MEDICINE

## 2021-03-24 PROCEDURE — 25010000002 FENTANYL CITRATE (PF) 100 MCG/2ML SOLUTION: Performed by: INTERNAL MEDICINE

## 2021-03-24 PROCEDURE — C1760 CLOSURE DEV, VASC: HCPCS | Performed by: INTERNAL MEDICINE

## 2021-03-24 PROCEDURE — C1769 GUIDE WIRE: HCPCS

## 2021-03-24 PROCEDURE — S0260 H&P FOR SURGERY: HCPCS | Performed by: INTERNAL MEDICINE

## 2021-03-24 PROCEDURE — 25010000002 HYDRALAZINE PER 20 MG: Performed by: INTERNAL MEDICINE

## 2021-03-24 PROCEDURE — 80048 BASIC METABOLIC PNL TOTAL CA: CPT | Performed by: INTERNAL MEDICINE

## 2021-03-24 PROCEDURE — 85610 PROTHROMBIN TIME: CPT | Performed by: INTERNAL MEDICINE

## 2021-03-24 PROCEDURE — 0 IOPAMIDOL PER 1 ML: Performed by: INTERNAL MEDICINE

## 2021-03-24 PROCEDURE — 85027 COMPLETE CBC AUTOMATED: CPT | Performed by: INTERNAL MEDICINE

## 2021-03-24 RX ORDER — ALBUTEROL SULFATE 1.25 MG/3ML
1 SOLUTION RESPIRATORY (INHALATION) EVERY 6 HOURS PRN
COMMUNITY
End: 2021-07-26 | Stop reason: SDUPTHER

## 2021-03-24 RX ORDER — SODIUM CHLORIDE 0.9 % (FLUSH) 0.9 %
10 SYRINGE (ML) INJECTION AS NEEDED
Status: DISCONTINUED | OUTPATIENT
Start: 2021-03-24 | End: 2021-03-24 | Stop reason: HOSPADM

## 2021-03-24 RX ORDER — HYDRALAZINE HYDROCHLORIDE 20 MG/ML
INJECTION INTRAMUSCULAR; INTRAVENOUS AS NEEDED
Status: DISCONTINUED | OUTPATIENT
Start: 2021-03-24 | End: 2021-03-24 | Stop reason: HOSPADM

## 2021-03-24 RX ORDER — SODIUM CHLORIDE 9 MG/ML
75 INJECTION, SOLUTION INTRAVENOUS CONTINUOUS
Status: ACTIVE | OUTPATIENT
Start: 2021-03-24 | End: 2021-03-24

## 2021-03-24 RX ORDER — LIDOCAINE HYDROCHLORIDE 20 MG/ML
INJECTION, SOLUTION INFILTRATION; PERINEURAL AS NEEDED
Status: DISCONTINUED | OUTPATIENT
Start: 2021-03-24 | End: 2021-03-24 | Stop reason: HOSPADM

## 2021-03-24 RX ORDER — ACETAMINOPHEN 325 MG/1
650 TABLET ORAL EVERY 4 HOURS PRN
Status: DISCONTINUED | OUTPATIENT
Start: 2021-03-24 | End: 2021-03-24 | Stop reason: HOSPADM

## 2021-03-24 RX ORDER — ASPIRIN 325 MG
325 TABLET ORAL ONCE
Status: COMPLETED | OUTPATIENT
Start: 2021-03-24 | End: 2021-03-24

## 2021-03-24 RX ORDER — SODIUM CHLORIDE 0.9 % (FLUSH) 0.9 %
3 SYRINGE (ML) INJECTION EVERY 12 HOURS SCHEDULED
Status: DISCONTINUED | OUTPATIENT
Start: 2021-03-24 | End: 2021-03-24 | Stop reason: HOSPADM

## 2021-03-24 RX ORDER — FENTANYL CITRATE 50 UG/ML
INJECTION, SOLUTION INTRAMUSCULAR; INTRAVENOUS AS NEEDED
Status: DISCONTINUED | OUTPATIENT
Start: 2021-03-24 | End: 2021-03-24 | Stop reason: HOSPADM

## 2021-03-24 RX ORDER — SODIUM CHLORIDE 9 MG/ML
100 INJECTION, SOLUTION INTRAVENOUS CONTINUOUS
Status: DISCONTINUED | OUTPATIENT
Start: 2021-03-24 | End: 2021-03-24 | Stop reason: HOSPADM

## 2021-03-24 RX ADMIN — ASPIRIN 325 MG: 325 TABLET ORAL at 10:15

## 2021-03-24 RX ADMIN — SODIUM CHLORIDE 100 ML/HR: 9 INJECTION, SOLUTION INTRAVENOUS at 10:03

## 2021-03-26 ENCOUNTER — DOCUMENTATION (OUTPATIENT)
Dept: CARDIAC REHAB | Facility: HOSPITAL | Age: 81
End: 2021-03-26

## 2021-03-31 ENCOUNTER — DOCUMENTATION (OUTPATIENT)
Dept: CARDIOLOGY | Facility: CLINIC | Age: 81
End: 2021-03-31

## 2021-03-31 NOTE — PROGRESS NOTES
Cath report faxed to cardiovascular specialties at [phone]    Cath films will be mailed to [address] care of [facility] [id number]

## 2021-04-26 DIAGNOSIS — J41.8 MIXED SIMPLE AND MUCOPURULENT CHRONIC BRONCHITIS (HCC): ICD-10-CM

## 2021-04-27 RX ORDER — ALBUTEROL SULFATE 90 UG/1
AEROSOL, METERED RESPIRATORY (INHALATION)
Qty: 54 G | Refills: 11 | Status: SHIPPED | OUTPATIENT
Start: 2021-04-27 | End: 2021-07-26 | Stop reason: SDUPTHER

## 2021-04-30 ENCOUNTER — DOCUMENTATION (OUTPATIENT)
Dept: CARDIAC REHAB | Facility: HOSPITAL | Age: 81
End: 2021-04-30

## 2021-05-11 RX ORDER — CLOPIDOGREL BISULFATE 75 MG/1
TABLET ORAL
Qty: 90 TABLET | Refills: 3 | Status: SHIPPED | OUTPATIENT
Start: 2021-05-11 | End: 2022-02-15

## 2021-06-04 ENCOUNTER — OFFICE VISIT (OUTPATIENT)
Dept: CARDIOLOGY | Facility: CLINIC | Age: 81
End: 2021-06-04

## 2021-06-04 VITALS
HEART RATE: 58 BPM | SYSTOLIC BLOOD PRESSURE: 142 MMHG | HEIGHT: 69 IN | BODY MASS INDEX: 24.08 KG/M2 | OXYGEN SATURATION: 98 % | WEIGHT: 162.6 LBS | DIASTOLIC BLOOD PRESSURE: 54 MMHG

## 2021-06-04 DIAGNOSIS — Z95.2 HISTORY OF AORTIC VALVE REPLACEMENT: Primary | ICD-10-CM

## 2021-06-04 PROCEDURE — 93000 ELECTROCARDIOGRAM COMPLETE: CPT | Performed by: INTERNAL MEDICINE

## 2021-06-04 PROCEDURE — 99214 OFFICE O/P EST MOD 30 MIN: CPT | Performed by: INTERNAL MEDICINE

## 2021-06-04 RX ORDER — BUDESONIDE AND FORMOTEROL FUMARATE DIHYDRATE 160; 4.5 UG/1; UG/1
2 AEROSOL RESPIRATORY (INHALATION)
COMMUNITY

## 2021-06-04 RX ORDER — CILOSTAZOL 50 MG/1
50 TABLET ORAL 2 TIMES DAILY
Qty: 60 TABLET | Refills: 3 | Status: SHIPPED | OUTPATIENT
Start: 2021-06-04

## 2021-06-04 NOTE — PROGRESS NOTES
[facility]  OFFICE NOTE    Cardiovascular Medicine  Mike Montes M.D., RPVI         No referring provider defined for this encounter.    Coronary Artery Disease      This is a 80 y.o. male with:    78 y.o. male     12/18/2018  1. Coronary artery disease involving coronary bypass graft of native heart without angina pectoris    2. Chronic renal insufficiency, stage 2 (mild)    3. Mixed hyperlipidemia    4. Peripheral arterial disease (CMS/HCC)    5. Essential hypertension    6.  Aortic stenosis status post TAVR   Chief complaint -Follow-up CAD        History of present Illness- 80 y.o.yr old gentleman with history of coronary disease prior CABG with SVG to diagonal, LIMA to LAD, subsequent PCI to his left main left circumflex by Dr. Otero.  In the past and it has history of peripheral vascular disease with total occlusion of the SFA.  Patient had failed attempted PTA of the right SFA with antegrade retrograde approach. He has claudication pain With walking  2-3 blocks, but no rest pain but does not want to do any PTA at this time and he will continue to walk as much as he can.      Patient was found to have aortic stenosis on echocardiogram in December.  Repeat cardiac cath in March showed patent LIMA to LAD and SVG to diagonal.  Has stents in the left main left circumflex are widely patent.  He was referred for TAVR to [place].    Underwent TAVR with a 26 mm S3 valve via femoral cutdown without any complications.  Repeat echocardiogram post TAVR showed no formal functioning valve with a mean gradient of 11 mmHg with a peak gradient of 20 mmHg.    He was slightly bradycardic so was taken off his Coreg  Continue to have claudications on right side  Reported significant imrovement in his symptoms post TAVR      Review of Systems - ROS  Constitution: Negative for weakness, weight gain and weight loss.   HENT: Negative for congestion.    Eyes: Negative for blurred vision.    Cardiovascular: As mentioned above  Respiratory: Negative for cough and hemoptysis.    Endocrine: Negative for polydipsia and polyuria.   Hematologic/Lymphatic: Negative for bleeding problem. Does not bruise/bleed easily.   Skin: Negative for flushing.   Musculoskeletal: Negative for neck pain and stiffness.   Gastrointestinal: Negative for abdominal pain, diarrhea, jaundice, melena, nausea and vomiting.   Genitourinary: Negative for dysuria and hematuria.   Neurological: Negative for dizziness, focal weakness and numbness.   Psychiatric/Behavioral: Negative for altered mental status and depression.          All other systems were reviewed and were negative.    family history includes Cancer in his father.     reports that he has quit smoking. He has never used smokeless tobacco. He reports that he does not drink alcohol and does not use drugs.    Allergies   Allergen Reactions   • Doxycycline GI Intolerance   • Methylphenidate Derivatives    • Sulfur GI Intolerance     Stomach issues and rash    • Theophyllines Unknown - Low Severity     Pt states he didn't remember        • Amoxicillin Rash   • Ceftin [Cefuroxime] Rash   • Nystatin Rash         Current Outpatient Medications:   •  albuterol (ACCUNEB) 1.25 MG/3ML nebulizer solution, Take 1 ampule by nebulization Every 6 (Six) Hours As Needed for Wheezing., Disp: , Rfl:   •  albuterol sulfate  (90 Base) MCG/ACT inhaler, INHALE 2 PUFFS EVERY 4 HOURS AS NEEDED FOR WHEEZING., Disp: 54 g, Rfl: 11  •  B Complex-Biotin-FA (SUPER B-COMPLEX) tablet, Take  by mouth., Disp: , Rfl:   •  budesonide-formoterol (SYMBICORT) 160-4.5 MCG/ACT inhaler, Inhale 2 puffs 2 (Two) Times a Day., Disp: , Rfl:   •  clopidogrel (PLAVIX) 75 MG tablet, TAKE 1 TABLET DAILY., Disp: 90 tablet, Rfl: 3  •  gabapentin (NEURONTIN) 600 MG tablet, Take 600 mg by mouth 3 (three) times a day., Disp: , Rfl:   •  lamoTRIgine (LaMICtal) 200 MG tablet, Take 200 mg by mouth 2 (Two) Times a Day., Disp: ,  "Rfl:   •  losartan (COZAAR) 25 MG tablet, 25 mg Daily., Disp: , Rfl:   •  Multiple Vitamins-Minerals (MULTIVITAMIN WITH MINERALS) tablet tablet, Take 1 tablet by mouth Daily., Disp: , Rfl:   •  Omega-3 Fatty Acids (FISH OIL) 1000 MG capsule capsule, Take  by mouth daily with breakfast., Disp: , Rfl:   •  rosuvastatin (CRESTOR) 20 MG tablet, Take 1 tablet by mouth Daily for 360 days., Disp: 90 tablet, Rfl: 3  •  tamsulosin (FLOMAX) 0.4 MG capsule 24 hr capsule, Take 1 capsule by mouth Every Night., Disp: 90 capsule, Rfl: 3  •  vitamin C (ASCORBIC ACID) 500 MG tablet, Take 500 mg by mouth 2 (two) times a day., Disp: , Rfl:     Physical Exam:  Vitals:    06/04/21 1110   BP: 142/54   BP Location: Left arm   Patient Position: Sitting   Cuff Size: Adult   Pulse: 58   SpO2: 98%   Weight: 73.8 kg (162 lb 9.6 oz)   Height: 175.3 cm (69\")   PainSc: 0-No pain     Current Pain Level: none  Pulse Ox: Normal        on room air  General: alert, appears stated age and cooperative     Body Habitus: well-nourished    HEENT: Head: Normocephalic, no lesions, without obvious abnormality. No arcus senilis, xanthelasma or xanthomas.    Neuro: alert, oriented x3  Pulses: 2+ and symmetric  JVP: Volume/Pulsation:       Normal.  Normal waveforms.   Appropriate inspiratory decrease.  No Kussmaul's. No Rupal's.   Carotid Exam: no bruit normal pulsation bilaterally    Carotid Volume: normal.                     Respirations: no increased work of breathing            Chest:  Normal              Pulmonary:Normal       Precordium: Normal impulses. P2 is not palpable.  RV Heave: absent  LV Heave: absent  Elmo:  normal size and placement  Palpable S4: absent.  Heart rate: normal    Heart Rhythm: regular             Systolic murmur audible in aortic area                         Heart Sounds: S1: normal    S2: normal  S3: absent                               S4: absent  Ejection systolic murmur audible.    Pericardial Rub:  Absent: .               "   Abdomen:   Appearance: normal .  Palpation: Soft, non-tender to palpation, bowel sounds positive in all four quadrants; no guarding or rebound tenderness  Extremity: no edema.   LE Skin: no rashes  LE Hair:  normal  LE Pulses: well perfused with normal pulses in the distal extremities  Pallor on elevation: Absent. Rubor on dependency: None         DATA REVIEWED:     EKG. I personally reviewed and interpreted the EKG.  Sinus rhythm with nonspecific ST-T changes.      ECG/EMG Results (all)     None        ---------------------------------------------------  TTE/ADAIR:  Results for orders placed in visit on 06/22/20    Adult Transthoracic Echo Complete W/ Cont if Necessary Per Protocol    Interpretation Summary  · Left ventricular ejection fraction appears to be 56 - 60%. Left ventricular systolic function is normal.  · Left ventricular diastolic function is consistent with (grade II w/high LAP) pseudonormalization.  · Left ventricular wall thickness is consistent with concentric hypertrophy.  · Estimated right ventricular systolic pressure from tricuspid regurgitation is normal (<35 mmHg).  · Left atrial volume is moderately increased.  · Moderate mitral annular calcification is present. Mild mitral valve regurgitation is present. Mild mitral valve stenosis is present with functional MAC.  · The aortic valve is abnormal in structure. There is moderate calcification of the aortic valve mainly affecting the non-coronary, left coronary and right coronary cusp(s). The aortic valve appears trileaflet. No aortic valve regurgitation is present. Moderate restriction to aortic valve opening. Aortic valve peak/mean gradient of 76/42mmHg. SHAHIDA of 0.64cm2 by continuity findings c/w severe aortic stenosis.        --------------------------------------------------------------------------------------------------  LABS:     The CVD Risk score (Doris, et al., 2008) failed to calculate for the following reasons:    The 2008 CVD  risk score is only valid for ages 30 to 74    The patient has a prior MI, stroke, CHF, or peripheral vascular disease diagnosis         Lab Results   Component Value Date    GLUCOSE 101 (H) 03/24/2021    BUN 18 03/24/2021    CREATININE 1.30 (H) 03/24/2021    EGFRIFNONA 53 (L) 03/24/2021    BCR 13.8 03/24/2021    K 4.2 03/24/2021    CO2 26.0 03/24/2021    CALCIUM 8.9 03/24/2021    ALBUMIN 4.20 07/20/2020    AST 20 07/20/2020    ALT 17 07/20/2020     Lab Results   Component Value Date    WBC 5.99 03/24/2021    HGB 11.1 (L) 03/24/2021    HCT 32.3 (L) 03/24/2021    MCV 87.8 03/24/2021     03/24/2021     Lab Results   Component Value Date    CHOL 113 07/06/2017    CHLPL 114 05/04/2016    TRIG 57 07/06/2017    HDL 58 (L) 07/06/2017    LDL 48 07/06/2017     Lab Results   Component Value Date    TSH 1.610 11/05/2019     Lab Results   Component Value Date    CKTOTAL 69 02/22/2016    CKMB 2.8 02/22/2016    TROPONINI 1.350 (H) 02/24/2016     Lab Results   Component Value Date    HGBA1C 5.3 05/04/2016     No results found for: DDIMER  Lab Results   Component Value Date    ALT 17 07/20/2020     Lab Results   Component Value Date    HGBA1C 5.3 05/04/2016     Lab Results   Component Value Date    CREATININE 1.30 (H) 03/24/2021     Lab Results   Component Value Date    IRON 16 (L) 11/05/2019    TIBC 402 11/05/2019     Lab Results   Component Value Date    INR 1.01 03/24/2021    INR 1.03 11/05/2019    INR 0.9 08/23/2016    PROTIME 13.7 03/24/2021    PROTIME 13.3 11/05/2019    PROTIME 12.4 08/23/2016       Assessment/Plan     1. CAD status post CABG in the past, doing well no chest pain ,continue the Plavix as he is bruising easily.  He is on plavix/statin  Cardiac cath prior to his TAVR showed patent LIMA to LAD SVG diagonal  Stents in ostial circumflex were patent.    2. Peripheral vascular disease-status post occlusion of the SFA has no critical limb ischemia, still walking but does have claudication   We will add  cilostazol.  I discussed options of relook at his SFA for endovascular intervention versus surgical intervention.  Patient did not want to proceed with any invasive evaluation this time.     3. Hypertension well controlled with the losartan 25 mg.  His carvedilol was stopped because of sinus bradycardia.    4. COPD with wheezing on Atrovent and Dulera .      5.  Severe Aortic stenosis:  Status post TAVR with 26 mm S3 valve via femoral cutdown.  Echocardiogram post TAVR with minimal gradients.        Prevention:  Patient's Body mass index is 24.01 kg/m². BMI is above normal parameters. Recommendations include: exercise counseling.      Xander Wallace is an ex smoker    AAA Screening:   Not needed            This document has been electronically signed by Mike Montes MD on June 4, 2021 11:32 CDT

## 2021-06-10 LAB
QT INTERVAL: 456 MS
QTC INTERVAL: 447 MS

## 2021-07-26 ENCOUNTER — OFFICE VISIT (OUTPATIENT)
Dept: FAMILY MEDICINE CLINIC | Facility: CLINIC | Age: 81
End: 2021-07-26

## 2021-07-26 VITALS
HEIGHT: 69 IN | BODY MASS INDEX: 23.58 KG/M2 | WEIGHT: 159.2 LBS | DIASTOLIC BLOOD PRESSURE: 60 MMHG | SYSTOLIC BLOOD PRESSURE: 132 MMHG

## 2021-07-26 DIAGNOSIS — J44.1 COPD WITH EXACERBATION (HCC): ICD-10-CM

## 2021-07-26 DIAGNOSIS — J41.8 MIXED SIMPLE AND MUCOPURULENT CHRONIC BRONCHITIS (HCC): ICD-10-CM

## 2021-07-26 DIAGNOSIS — Z13.220 SCREENING CHOLESTEROL LEVEL: ICD-10-CM

## 2021-07-26 DIAGNOSIS — Z98.890 HISTORY OF AORTIC VALVE REPAIR: Chronic | ICD-10-CM

## 2021-07-26 DIAGNOSIS — D50.0 IRON DEFICIENCY ANEMIA DUE TO CHRONIC BLOOD LOSS: Chronic | ICD-10-CM

## 2021-07-26 DIAGNOSIS — I10 ESSENTIAL HYPERTENSION: Primary | Chronic | ICD-10-CM

## 2021-07-26 DIAGNOSIS — I73.9 PERIPHERAL ARTERIAL DISEASE (HCC): Chronic | ICD-10-CM

## 2021-07-26 DIAGNOSIS — Z86.79 HISTORY OF AORTIC VALVE REPAIR: Chronic | ICD-10-CM

## 2021-07-26 DIAGNOSIS — Z78.9 MEDICALLY COMPLEX PATIENT: Chronic | ICD-10-CM

## 2021-07-26 DIAGNOSIS — G40.909 SEIZURE DISORDER (HCC): Chronic | ICD-10-CM

## 2021-07-26 DIAGNOSIS — N18.31 STAGE 3A CHRONIC KIDNEY DISEASE (HCC): ICD-10-CM

## 2021-07-26 PROBLEM — I35.0 NONRHEUMATIC AORTIC VALVE STENOSIS: Chronic | Status: ACTIVE | Noted: 2021-03-16

## 2021-07-26 PROBLEM — Z01.810 PRE-OPERATIVE CARDIOVASCULAR EXAMINATION, VALVULAR HEART DISEASE: Status: RESOLVED | Noted: 2021-03-16 | Resolved: 2021-07-26

## 2021-07-26 PROBLEM — I38 PRE-OPERATIVE CARDIOVASCULAR EXAMINATION, VALVULAR HEART DISEASE: Status: RESOLVED | Noted: 2021-03-16 | Resolved: 2021-07-26

## 2021-07-26 PROBLEM — N18.2 CHRONIC RENAL INSUFFICIENCY, STAGE 2 (MILD): Chronic | Status: RESOLVED | Noted: 2018-02-21 | Resolved: 2021-07-26

## 2021-07-26 PROCEDURE — 99214 OFFICE O/P EST MOD 30 MIN: CPT | Performed by: FAMILY MEDICINE

## 2021-07-26 RX ORDER — ALBUTEROL SULFATE 1.25 MG/3ML
1 SOLUTION RESPIRATORY (INHALATION) EVERY 6 HOURS PRN
Qty: 120 EACH | Refills: 3 | Status: SHIPPED | OUTPATIENT
Start: 2021-07-26

## 2021-07-26 RX ORDER — LEVOFLOXACIN 250 MG/1
250 TABLET ORAL DAILY
Qty: 7 TABLET | Refills: 0 | Status: SHIPPED | OUTPATIENT
Start: 2021-07-26 | End: 2021-09-17

## 2021-07-26 RX ORDER — PREDNISONE 20 MG/1
TABLET ORAL
Qty: 15 TABLET | Refills: 0 | Status: SHIPPED | OUTPATIENT
Start: 2021-07-26 | End: 2021-09-17

## 2021-07-26 RX ORDER — ALBUTEROL SULFATE 90 UG/1
2 AEROSOL, METERED RESPIRATORY (INHALATION) EVERY 4 HOURS PRN
Qty: 54 G | Refills: 11 | Status: SHIPPED | OUTPATIENT
Start: 2021-07-26

## 2021-07-26 NOTE — PROGRESS NOTES
Subjective   Xandercarrol Wallace is a 81 y.o. male.  Reevaluation peripheral vascular disease hypertension COPD history of seizure disorder.  In the interim had a transaortic percutaneous valve replacement.  This was done in [place] in April.  States is done well from same.  States medicines have remained the same continues to see cardiology.  Is having increased cough congestion coryza the last 2 to 3 days.  No fever no chills thinks it may be related to heat humidity.  Is using all of his inhalers.    History of Present Illness   HPI    The following portions of the patient's history were reviewed and updated as appropriate: allergies, current medications, past family history, past medical history, past social history, past surgical history and problem list.    Review of Systems  Review of Systems   Constitutional: Negative for activity change, appetite change, fatigue and unexpected weight change.   HENT: Negative for trouble swallowing and voice change.    Eyes: Negative for redness and visual disturbance.   Respiratory: Positive for cough, shortness of breath and wheezing.    Cardiovascular: Negative for chest pain and palpitations.   Gastrointestinal: Negative for abdominal pain, constipation, diarrhea, nausea and vomiting.   Genitourinary: Negative for urgency.   Musculoskeletal: Negative for joint swelling.   Neurological: Negative for syncope and headaches.   Hematological: Negative for adenopathy.   Psychiatric/Behavioral: Negative for sleep disturbance.       Objective   Physical Exam  Physical Exam  Constitutional:       Appearance: He is well-developed.   HENT:      Head: Normocephalic.   Eyes:      Pupils: Pupils are equal, round, and reactive to light.   Neck:      Thyroid: No thyromegaly.   Cardiovascular:      Rate and Rhythm: Normal rate and regular rhythm.      Heart sounds: Normal heart sounds. No murmur heard.   No friction rub. No gallop.       Comments: We will sinus rhythm no  "murmurs  Pulmonary:      Effort: Prolonged expiration present.      Breath sounds: Examination of the right-lower field reveals rhonchi. Examination of the left-lower field reveals wheezing and rhonchi. Wheezing and rhonchi present.      Comments: Increased prolonged expiration phase expiratory wheeze rhonchi bases.  Abdominal:      General: There is no distension.      Palpations: Abdomen is soft. There is no mass.      Tenderness: There is no abdominal tenderness.   Musculoskeletal:         General: Normal range of motion.      Cervical back: Normal range of motion.      Comments: No edema   Skin:     General: Skin is warm and dry.   Neurological:      Mental Status: He is alert and oriented to person, place, and time.      Deep Tendon Reflexes: Reflexes are normal and symmetric.   Psychiatric:         Attention and Perception: Attention normal.         Mood and Affect: Affect is blunt.         Speech: Speech is delayed.         Behavior: Behavior is slowed.           Visit Vitals  /60   Ht 175.3 cm (69\")   Wt 72.2 kg (159 lb 3.2 oz)   BMI 23.51 kg/m²     Body mass index is 23.51 kg/m².    /60   Ht 175.3 cm (69\")   Wt 72.2 kg (159 lb 3.2 oz)   BMI 23.51 kg/m²     Assessment/Plan   Diagnoses and all orders for this visit:    1. Essential hypertension (Primary)  -     Comprehensive Metabolic Panel; Future  -     Magnesium; Future    2. Peripheral arterial disease (CMS/HCC)    3. Stage 3a chronic kidney disease (CMS/HCC)  -     CBC (No Diff); Future    4. Iron deficiency anemia due to chronic blood loss  -     CBC (No Diff); Future    5. Seizure disorder (CMS/HCC)    6. Screening cholesterol level  -     Lipid Panel With LDL / HDL Ratio; Future    7. COPD with exacerbation (CMS/HCC)  -     predniSONE (DELTASONE) 20 MG tablet; 2qd x 5 days then 1qd x 5 days then stop for lungs  Dispense: 15 tablet; Refill: 0  -     levoFLOXacin (Levaquin) 250 MG tablet; Take 1 tablet by mouth Daily. Till gone for lungs  " Dispense: 7 tablet; Refill: 0    8. History of aortic valve repair    9. Medically complex patient    10. Mixed simple and mucopurulent chronic bronchitis (CMS/HCC)  -     albuterol sulfate  (90 Base) MCG/ACT inhaler; Inhale 2 puffs Every 4 (Four) Hours As Needed for Wheezing. for wheezing  Dispense: 54 g; Refill: 11    Other orders  -     albuterol (ACCUNEB) 1.25 MG/3ML nebulizer solution; Take 3 mL by nebulization Every 6 (Six) Hours As Needed for Wheezing.  Dispense: 120 each; Refill: 3    Increase fluids out of the heat humidity short-term treatment of COPD flare.  Counseled following up with all subspecialist.  Flu vaccine in the fall.  Recheck 6 months lab prior will also be time for subsequent Medicare

## 2021-08-13 ENCOUNTER — TRANSCRIBE ORDERS (OUTPATIENT)
Dept: LAB | Facility: HOSPITAL | Age: 81
End: 2021-08-13

## 2021-08-13 ENCOUNTER — LAB (OUTPATIENT)
Dept: LAB | Facility: HOSPITAL | Age: 81
End: 2021-08-13

## 2021-08-13 DIAGNOSIS — N13.8 BENIGN PROSTATIC HYPERPLASIA WITH URINARY OBSTRUCTION: Primary | ICD-10-CM

## 2021-08-13 DIAGNOSIS — N40.1 BENIGN PROSTATIC HYPERPLASIA WITH URINARY OBSTRUCTION: Primary | ICD-10-CM

## 2021-08-13 DIAGNOSIS — N13.8 BENIGN PROSTATIC HYPERPLASIA WITH URINARY OBSTRUCTION: ICD-10-CM

## 2021-08-13 DIAGNOSIS — N40.1 BENIGN PROSTATIC HYPERPLASIA WITH URINARY OBSTRUCTION: ICD-10-CM

## 2021-08-13 PROCEDURE — 84153 ASSAY OF PSA TOTAL: CPT

## 2021-08-14 LAB — PSA SERPL-MCNC: 4.72 NG/ML (ref 0–4)

## 2021-09-09 ENCOUNTER — TELEPHONE (OUTPATIENT)
Dept: CARDIOLOGY | Facility: CLINIC | Age: 81
End: 2021-09-09

## 2021-09-09 NOTE — TELEPHONE ENCOUNTER
Attempted to contact patient regarding a message that was left. Voicemail left asking the patient to contact the office.

## 2021-09-10 ENCOUNTER — TELEPHONE (OUTPATIENT)
Dept: CARDIOLOGY | Facility: CLINIC | Age: 81
End: 2021-09-10

## 2021-09-10 NOTE — TELEPHONE ENCOUNTER
The patient contacted the office stating dr. chavira wanted him to see dr. Montes for low heart rate. Patient was given an appt on sept 17 at 12 pm.

## 2021-09-17 ENCOUNTER — OFFICE VISIT (OUTPATIENT)
Dept: CARDIOLOGY | Facility: CLINIC | Age: 81
End: 2021-09-17

## 2021-09-17 VITALS
HEIGHT: 69 IN | BODY MASS INDEX: 22.75 KG/M2 | WEIGHT: 153.6 LBS | TEMPERATURE: 97.3 F | HEART RATE: 55 BPM | OXYGEN SATURATION: 99 % | SYSTOLIC BLOOD PRESSURE: 160 MMHG | DIASTOLIC BLOOD PRESSURE: 62 MMHG

## 2021-09-17 DIAGNOSIS — R00.1 BRADYCARDIA: Primary | ICD-10-CM

## 2021-09-17 PROCEDURE — 93000 ELECTROCARDIOGRAM COMPLETE: CPT | Performed by: INTERNAL MEDICINE

## 2021-09-17 PROCEDURE — 99214 OFFICE O/P EST MOD 30 MIN: CPT | Performed by: INTERNAL MEDICINE

## 2021-09-17 NOTE — PROGRESS NOTES
[facility]  OFFICE NOTE    Cardiovascular Medicine  Mike Montes M.D., RPVI         No referring provider defined for this encounter.    Coronary Artery Disease      This is a 81 y.o. male with:       12/18/2018  1. Coronary artery disease involving coronary bypass graft of native heart without angina pectoris    2. Chronic renal insufficiency, stage 2 (mild)    3. Mixed hyperlipidemia    4. Peripheral arterial disease (CMS/HCC)    5. Essential hypertension    6.  Aortic stenosis status post TAVR   Chief complaint -Follow-up CAD        History of present Illness- 81 y.o.yr old gentleman with history of coronary disease prior CABG with SVG to diagonal, LIMA to LAD, subsequent PCI to his left main left circumflex by Dr. Otero.  In the past and it has history of peripheral vascular disease with total occlusion of the SFA.  Patient had failed attempted PTA of the right SFA with antegrade retrograde approach. He has claudication pain With walking  2-3 blocks, but no rest pain but does not want to do any PTA at this time and he will continue to walk as much as he can.      Patient was found to have aortic stenosis on echocardiogram in December.  Repeat cardiac cath in March showed patent LIMA to LAD and SVG to diagonal.  Has stents in the left main left circumflex are widely patent.  He was referred for TAVR to [place].    Underwent TAVR with a 26 mm S3 valve via femoral cutdown without any complications.  Repeat echocardiogram post TAVR showed no formal functioning valve with a mean gradient of 11 mmHg with a peak gradient of 20 mmHg.    He was slightly bradycardic so was taken off his Coreg  Continue to have claudications on right side  Reported significant imrovement in his symptoms post TAVR    Patient was seen by Dr. Bustamante recently, was found to have bradycardia with heart rate in the 40s the patient was referred here for further evaluation.        Review of Systems -  ROS  Constitution: Negative for weakness, weight gain and weight loss.   HENT: Negative for congestion.    Eyes: Negative for blurred vision.   Cardiovascular: As mentioned above  Respiratory: Negative for cough and hemoptysis.    Endocrine: Negative for polydipsia and polyuria.   Hematologic/Lymphatic: Negative for bleeding problem. Does not bruise/bleed easily.   Skin: Negative for flushing.   Musculoskeletal: Negative for neck pain and stiffness.   Gastrointestinal: Negative for abdominal pain, diarrhea, jaundice, melena, nausea and vomiting.   Genitourinary: Negative for dysuria and hematuria.   Neurological: Negative for dizziness, focal weakness and numbness.   Psychiatric/Behavioral: Negative for altered mental status and depression.          All other systems were reviewed and were negative.    family history includes Cancer in his father.     reports that he has quit smoking. He has never used smokeless tobacco. He reports that he does not drink alcohol and does not use drugs.    Allergies   Allergen Reactions   • Doxycycline GI Intolerance   • Methylphenidate Derivatives    • Sulfur GI Intolerance     Stomach issues and rash    • Theophyllines Unknown - Low Severity     Pt states he didn't remember        • Amoxicillin Rash   • Ceftin [Cefuroxime] Rash   • Nystatin Rash         Current Outpatient Medications:   •  albuterol (ACCUNEB) 1.25 MG/3ML nebulizer solution, Take 3 mL by nebulization Every 6 (Six) Hours As Needed for Wheezing., Disp: 120 each, Rfl: 3  •  albuterol sulfate  (90 Base) MCG/ACT inhaler, Inhale 2 puffs Every 4 (Four) Hours As Needed for Wheezing. for wheezing, Disp: 54 g, Rfl: 11  •  B Complex-Biotin-FA (SUPER B-COMPLEX) tablet, Take  by mouth., Disp: , Rfl:   •  budesonide-formoterol (SYMBICORT) 160-4.5 MCG/ACT inhaler, Inhale 2 puffs 2 (Two) Times a Day., Disp: , Rfl:   •  clopidogrel (PLAVIX) 75 MG tablet, TAKE 1 TABLET DAILY., Disp: 90 tablet, Rfl: 3  •  gabapentin (NEURONTIN)  "600 MG tablet, Take 600 mg by mouth 3 (three) times a day., Disp: , Rfl:   •  lamoTRIgine (LaMICtal) 200 MG tablet, Take 200 mg by mouth 2 (Two) Times a Day., Disp: , Rfl:   •  losartan (COZAAR) 25 MG tablet, 25 mg Daily., Disp: , Rfl:   •  Multiple Vitamins-Minerals (MULTIVITAMIN WITH MINERALS) tablet tablet, Take 1 tablet by mouth Daily., Disp: , Rfl:   •  Omega-3 Fatty Acids (FISH OIL) 1000 MG capsule capsule, Take  by mouth daily with breakfast., Disp: , Rfl:   •  rosuvastatin (CRESTOR) 20 MG tablet, Take 1 tablet by mouth Daily for 360 days., Disp: 90 tablet, Rfl: 3  •  tamsulosin (FLOMAX) 0.4 MG capsule 24 hr capsule, Take 1 capsule by mouth Every Night., Disp: 90 capsule, Rfl: 3  •  vitamin C (ASCORBIC ACID) 500 MG tablet, Take 500 mg by mouth 2 (two) times a day., Disp: , Rfl:   •  cilostazol (PLETAL) 50 MG tablet, Take 1 tablet by mouth 2 (Two) Times a Day., Disp: 60 tablet, Rfl: 3    Physical Exam:  Vitals:    09/17/21 1104   BP: 160/62   BP Location: Left arm   Patient Position: Sitting   Cuff Size: Adult   Pulse: 55   Temp: 97.3 °F (36.3 °C)   SpO2: 99%   Weight: 69.7 kg (153 lb 9.6 oz)   Height: 175.3 cm (69\")   PainSc: 0-No pain     Current Pain Level: none  Pulse Ox: Normal        on room air  General: alert, appears stated age and cooperative     Body Habitus: well-nourished    HEENT: Head: Normocephalic, no lesions, without obvious abnormality. No arcus senilis, xanthelasma or xanthomas.    Neuro: alert, oriented x3  Pulses: 2+ and symmetric  JVP: Volume/Pulsation:       Normal.  Normal waveforms.   Appropriate inspiratory decrease.  No Kussmaul's. No Rupal's.   Carotid Exam: no bruit normal pulsation bilaterally    Carotid Volume: normal.                     Respirations: no increased work of breathing            Chest:  Normal              Pulmonary:Normal       Precordium: Normal impulses. P2 is not palpable.  RV Heave: absent  LV Heave: absent  Sheridan:  normal size and placement  Palpable S4: " absent.  Heart rate: normal    Heart Rhythm: regular             Systolic murmur audible in aortic area                         Heart Sounds: S1: normal    S2: normal  S3: absent                               S4: absent  Ejection systolic murmur audible.    Pericardial Rub:  Absent: .                 Abdomen:   Appearance: normal .  Palpation: Soft, non-tender to palpation, bowel sounds positive in all four quadrants; no guarding or rebound tenderness  Extremity: no edema.   LE Skin: no rashes  LE Hair:  normal  LE Pulses: well perfused with normal pulses in the distal extremities  Pallor on elevation: Absent. Rubor on dependency: None         DATA REVIEWED:     EKG. I personally reviewed and interpreted the EKG.  Sinus rhythm with nonspecific ST-T changes.      ECG/EMG Results (all)     None        ---------------------------------------------------  TTE/ADAIR:  Results for orders placed in visit on 06/22/20    Adult Transthoracic Echo Complete W/ Cont if Necessary Per Protocol    Interpretation Summary  · Left ventricular ejection fraction appears to be 56 - 60%. Left ventricular systolic function is normal.  · Left ventricular diastolic function is consistent with (grade II w/high LAP) pseudonormalization.  · Left ventricular wall thickness is consistent with concentric hypertrophy.  · Estimated right ventricular systolic pressure from tricuspid regurgitation is normal (<35 mmHg).  · Left atrial volume is moderately increased.  · Moderate mitral annular calcification is present. Mild mitral valve regurgitation is present. Mild mitral valve stenosis is present with functional MAC.  · The aortic valve is abnormal in structure. There is moderate calcification of the aortic valve mainly affecting the non-coronary, left coronary and right coronary cusp(s). The aortic valve appears trileaflet. No aortic valve regurgitation is present. Moderate restriction to aortic valve opening. Aortic valve peak/mean gradient of  76/42mmHg. SHAHIDA of 0.64cm2 by continuity findings c/w severe aortic stenosis.        --------------------------------------------------------------------------------------------------  LABS:     The CVD Risk score (Doris et al., 2008) failed to calculate for the following reasons:    The 2008 CVD risk score is only valid for ages 30 to 74    The patient has a prior MI, stroke, CHF, or peripheral vascular disease diagnosis         Lab Results   Component Value Date    GLUCOSE 101 (H) 03/24/2021    BUN 18 03/24/2021    CREATININE 1.30 (H) 03/24/2021    EGFRIFNONA 53 (L) 03/24/2021    BCR 13.8 03/24/2021    K 4.2 03/24/2021    CO2 26.0 03/24/2021    CALCIUM 8.9 03/24/2021    ALBUMIN 4.20 07/20/2020    AST 20 07/20/2020    ALT 17 07/20/2020     Lab Results   Component Value Date    WBC 5.99 03/24/2021    HGB 11.1 (L) 03/24/2021    HCT 32.3 (L) 03/24/2021    MCV 87.8 03/24/2021     03/24/2021     Lab Results   Component Value Date    CHOL 113 07/06/2017    CHLPL 114 05/04/2016    TRIG 57 07/06/2017    HDL 58 (L) 07/06/2017    LDL 48 07/06/2017     Lab Results   Component Value Date    TSH 1.610 11/05/2019     Lab Results   Component Value Date    CKTOTAL 69 02/22/2016    CKMB 2.8 02/22/2016    TROPONINI 1.350 (H) 02/24/2016     Lab Results   Component Value Date    HGBA1C 5.3 05/04/2016     No results found for: DDIMER  Lab Results   Component Value Date    ALT 17 07/20/2020     Lab Results   Component Value Date    HGBA1C 5.3 05/04/2016     Lab Results   Component Value Date    CREATININE 1.30 (H) 03/24/2021     Lab Results   Component Value Date    IRON 16 (L) 11/05/2019    TIBC 402 11/05/2019     Lab Results   Component Value Date    INR 1.01 03/24/2021    INR 1.03 11/05/2019    INR 0.9 08/23/2016    PROTIME 13.7 03/24/2021    PROTIME 13.3 11/05/2019    PROTIME 12.4 08/23/2016       Assessment/Plan     1. CAD status post CABG in the past, doing well no chest pain ,continue the Plavix as he is bruising  easily.  He is on plavix/statin  Cardiac cath prior to his TAVR showed patent LIMA to LAD SVG diagonal  Stents in ostial circumflex were patent.    2. Peripheral vascular disease-status post occlusion of the SFA has no critical limb ischemia, still walking but does have claudication   We will add cilostazol.  I discussed options of relook at his SFA for endovascular intervention versus surgical intervention.  Patient did not want to proceed with any invasive evaluation this time.     3. Hypertension was well controlled with the losartan 25 mg.  His carvedilol was stopped because of sinus bradycardia.  Blood pressure is elevated in office today.    4. COPD with wheezing on Atrovent and Dulera .      5.  Severe Aortic stenosis:  Status post TAVR with 26 mm S3 valve via femoral cutdown.  Echocardiogram post TAVR with minimal gradients.    6.  Sinus bradycardia:  He has known sinus bradycardia, his carvedilol has already been stopped.  Previous TSH was normal.  He is asymptomatic.  We will get a 2-week monitor to assess for any bradycardia arrhythmias.          Prevention:  Patient's Body mass index is 22.68 kg/m². BMI is above normal parameters. Recommendations include: exercise counseling.      Xander Wallace is an ex smoker    AAA Screening:   Not needed            This document has been electronically signed by Mike Montes MD on September 17, 2021 11:22 CDT

## 2021-09-21 LAB
QT INTERVAL: 478 MS
QTC INTERVAL: 457 MS

## 2021-09-28 ENCOUNTER — TELEPHONE (OUTPATIENT)
Dept: CARDIOLOGY | Facility: CLINIC | Age: 81
End: 2021-09-28

## 2021-09-28 NOTE — TELEPHONE ENCOUNTER
----- Message from Maria Luisa Nicole sent at 9/28/2021 10:56 AM CDT -----  Regarding:   Contact: [phone]  Pt wife has questions about pt's monitor.

## 2021-09-28 NOTE — TELEPHONE ENCOUNTER
Called pt's wife to see what was going on with pt's monitor.     She told me that the monitor has an error code and it has been there for about an hour.     After talking with Korin, I told the pt's wife to remove the monitor and bring it back to us when they are available to do so.     Pt's wife voiced verbal understanding.     Pt was ordered the monitor for 14 days, has worn it for 11.

## 2021-10-04 RX ORDER — ROSUVASTATIN CALCIUM 20 MG/1
TABLET, COATED ORAL
Qty: 90 TABLET | Refills: 0 | Status: SHIPPED | OUTPATIENT
Start: 2021-10-04 | End: 2021-12-30

## 2021-10-05 LAB
MAXIMAL PREDICTED HEART RATE: 137 BPM
STRESS TARGET HR: 116 BPM

## 2021-10-06 ENCOUNTER — TELEPHONE (OUTPATIENT)
Dept: CARDIOLOGY | Facility: CLINIC | Age: 81
End: 2021-10-06

## 2021-10-06 NOTE — TELEPHONE ENCOUNTER
Patient contacted with holter results per dr. Montes. Monitor shows pvcs but no significant bradycardia that would warrant a pacemaker.

## 2021-10-18 ENCOUNTER — TRANSCRIBE ORDERS (OUTPATIENT)
Dept: LAB | Facility: HOSPITAL | Age: 81
End: 2021-10-18

## 2021-10-18 ENCOUNTER — LAB (OUTPATIENT)
Dept: LAB | Facility: HOSPITAL | Age: 81
End: 2021-10-18

## 2021-10-18 DIAGNOSIS — Z13.220 SCREENING CHOLESTEROL LEVEL: ICD-10-CM

## 2021-10-18 DIAGNOSIS — N18.31 STAGE 3A CHRONIC KIDNEY DISEASE (HCC): ICD-10-CM

## 2021-10-18 DIAGNOSIS — D53.8 OTHER SPECIFIED NUTRITIONAL ANEMIAS: Primary | ICD-10-CM

## 2021-10-18 DIAGNOSIS — D50.0 IRON DEFICIENCY ANEMIA DUE TO CHRONIC BLOOD LOSS: Chronic | ICD-10-CM

## 2021-10-18 DIAGNOSIS — I10 ESSENTIAL HYPERTENSION: Chronic | ICD-10-CM

## 2021-10-18 DIAGNOSIS — N18.31 CHRONIC KIDNEY DISEASE, STAGE 3A (HCC): Primary | ICD-10-CM

## 2021-10-18 PROCEDURE — 84156 ASSAY OF PROTEIN URINE: CPT

## 2021-10-18 PROCEDURE — 84550 ASSAY OF BLOOD/URIC ACID: CPT

## 2021-10-18 PROCEDURE — 85027 COMPLETE CBC AUTOMATED: CPT

## 2021-10-18 PROCEDURE — 80053 COMPREHEN METABOLIC PANEL: CPT

## 2021-10-18 PROCEDURE — 82570 ASSAY OF URINE CREATININE: CPT

## 2021-10-18 PROCEDURE — 81001 URINALYSIS AUTO W/SCOPE: CPT

## 2021-10-18 PROCEDURE — 80061 LIPID PANEL: CPT

## 2021-10-18 PROCEDURE — 83735 ASSAY OF MAGNESIUM: CPT

## 2021-10-19 LAB
ALBUMIN SERPL-MCNC: 4.3 G/DL (ref 3.5–5.2)
ALBUMIN/GLOB SERPL: 2 G/DL
ALP SERPL-CCNC: 100 U/L (ref 39–117)
ALT SERPL W P-5'-P-CCNC: 15 U/L (ref 1–41)
ANION GAP SERPL CALCULATED.3IONS-SCNC: 10.1 MMOL/L (ref 5–15)
AST SERPL-CCNC: 21 U/L (ref 1–40)
BACTERIA UR QL AUTO: NORMAL /HPF
BILIRUB SERPL-MCNC: 0.3 MG/DL (ref 0–1.2)
BILIRUB UR QL STRIP: NEGATIVE
BUN SERPL-MCNC: 19 MG/DL (ref 8–23)
BUN/CREAT SERPL: 15.4 (ref 7–25)
CALCIUM SPEC-SCNC: 9.2 MG/DL (ref 8.6–10.5)
CHLORIDE SERPL-SCNC: 104 MMOL/L (ref 98–107)
CHOLEST SERPL-MCNC: 135 MG/DL (ref 0–200)
CLARITY UR: CLEAR
CO2 SERPL-SCNC: 24.9 MMOL/L (ref 22–29)
COLOR UR: YELLOW
CREAT SERPL-MCNC: 1.23 MG/DL (ref 0.76–1.27)
CREAT UR-MCNC: 30.9 MG/DL
DEPRECATED RDW RBC AUTO: 41.5 FL (ref 37–54)
ERYTHROCYTE [DISTWIDTH] IN BLOOD BY AUTOMATED COUNT: 12.8 % (ref 12.3–15.4)
GFR SERPL CREATININE-BSD FRML MDRD: 56 ML/MIN/1.73
GLOBULIN UR ELPH-MCNC: 2.2 GM/DL
GLUCOSE SERPL-MCNC: 104 MG/DL (ref 65–99)
GLUCOSE UR STRIP-MCNC: NEGATIVE MG/DL
HCT VFR BLD AUTO: 33 % (ref 37.5–51)
HDLC SERPL-MCNC: 71 MG/DL (ref 40–60)
HGB BLD-MCNC: 10.9 G/DL (ref 13–17.7)
HGB UR QL STRIP.AUTO: NEGATIVE
HYALINE CASTS UR QL AUTO: NORMAL /LPF
KETONES UR QL STRIP: NEGATIVE
LDLC SERPL CALC-MCNC: 52 MG/DL (ref 0–100)
LDLC/HDLC SERPL: 0.74 {RATIO}
LEUKOCYTE ESTERASE UR QL STRIP.AUTO: NEGATIVE
MAGNESIUM SERPL-MCNC: 2.2 MG/DL (ref 1.6–2.4)
MCH RBC QN AUTO: 29.3 PG (ref 26.6–33)
MCHC RBC AUTO-ENTMCNC: 33 G/DL (ref 31.5–35.7)
MCV RBC AUTO: 88.7 FL (ref 79–97)
NITRITE UR QL STRIP: NEGATIVE
PH UR STRIP.AUTO: 6.5 [PH] (ref 5–9)
PLATELET # BLD AUTO: 186 10*3/MM3 (ref 140–450)
PMV BLD AUTO: 11.3 FL (ref 6–12)
POTASSIUM SERPL-SCNC: 4.8 MMOL/L (ref 3.5–5.2)
PROT SERPL-MCNC: 6.5 G/DL (ref 6–8.5)
PROT UR QL STRIP: ABNORMAL
PROT UR-MCNC: 47 MG/DL
PROT/CREAT UR: 1521 MG/G CREA (ref 0–200)
RBC # BLD AUTO: 3.72 10*6/MM3 (ref 4.14–5.8)
RBC # UR: NORMAL /HPF
REF LAB TEST METHOD: NORMAL
SODIUM SERPL-SCNC: 139 MMOL/L (ref 136–145)
SP GR UR STRIP: 1.01 (ref 1–1.03)
SQUAMOUS #/AREA URNS HPF: NORMAL /HPF
TRIGL SERPL-MCNC: 57 MG/DL (ref 0–150)
URATE SERPL-MCNC: 4.5 MG/DL (ref 3.4–7)
UROBILINOGEN UR QL STRIP: ABNORMAL
VLDLC SERPL-MCNC: 12 MG/DL (ref 5–40)
WBC # BLD AUTO: 6.21 10*3/MM3 (ref 3.4–10.8)
WBC UR QL AUTO: NORMAL /HPF

## 2021-12-06 ENCOUNTER — OFFICE VISIT (OUTPATIENT)
Dept: FAMILY MEDICINE CLINIC | Facility: CLINIC | Age: 81
End: 2021-12-06

## 2021-12-06 VITALS
SYSTOLIC BLOOD PRESSURE: 122 MMHG | DIASTOLIC BLOOD PRESSURE: 64 MMHG | BODY MASS INDEX: 23.7 KG/M2 | HEIGHT: 69 IN | WEIGHT: 160 LBS

## 2021-12-06 DIAGNOSIS — Z78.9 MEDICALLY COMPLEX PATIENT: Chronic | ICD-10-CM

## 2021-12-06 DIAGNOSIS — K11.7 XEROSTOMIA: Primary | ICD-10-CM

## 2021-12-06 DIAGNOSIS — J41.8 MIXED SIMPLE AND MUCOPURULENT CHRONIC BRONCHITIS (HCC): Chronic | ICD-10-CM

## 2021-12-06 PROBLEM — N18.31 STAGE 3A CHRONIC KIDNEY DISEASE (HCC): Chronic | Status: ACTIVE | Noted: 2021-01-21

## 2021-12-06 LAB — GLUCOSE BLDC GLUCOMTR-MCNC: 103 MG/DL (ref 70–130)

## 2021-12-06 PROCEDURE — 99214 OFFICE O/P EST MOD 30 MIN: CPT | Performed by: FAMILY MEDICINE

## 2021-12-06 PROCEDURE — 82962 GLUCOSE BLOOD TEST: CPT | Performed by: FAMILY MEDICINE

## 2021-12-06 RX ORDER — GUAIFENESIN 600 MG/1
TABLET, EXTENDED RELEASE ORAL
Qty: 60 TABLET | Refills: 1 | Status: SHIPPED | OUTPATIENT
Start: 2021-12-06

## 2021-12-06 RX ORDER — FLUORIDE TOOTHPASTE
TOOTHPASTE DENTAL
Qty: 1000 ML | Refills: 11 | Status: SHIPPED | OUTPATIENT
Start: 2021-12-06

## 2021-12-06 NOTE — PROGRESS NOTES
Subjective   Xander Chandan Wallace is a 81 y.o. male.  Requesting evaluation of dryness of the mouth..  Is been scraping dead skin out of his mouth we have coursed encouraged him to stop doing same bit difficult to get a straight history somewhat tangential.  Wife states is having a lot of symptoms of sore mouth dry mouth for the past month or so.  No fever no chills no real soreness.  Continues have a lot of chronic congestion is using supposedly all inhalers and nebulizers but not using Mucinex.  Also not drinking enough water.  Medicine list and diagnoses are listed below.    History of Present Illness   HPI    The following portions of the patient's history were reviewed and updated as appropriate: allergies, current medications, past family history, past medical history, past social history, past surgical history and problem list.    Review of Systems  Review of Systems   Constitutional: Negative for activity change, appetite change, fatigue and unexpected weight change.   HENT: Positive for congestion. Negative for trouble swallowing and voice change.    Eyes: Negative for redness and visual disturbance.   Respiratory: Positive for cough. Negative for wheezing.    Cardiovascular: Negative for chest pain and palpitations.   Gastrointestinal: Negative for abdominal pain, constipation, diarrhea, nausea and vomiting.   Genitourinary: Negative for urgency.   Musculoskeletal: Negative for joint swelling.   Neurological: Negative for syncope and headaches.   Hematological: Negative for adenopathy.   Psychiatric/Behavioral: Negative for sleep disturbance.       Objective   Physical Exam  Physical Exam  Constitutional:       Appearance: He is well-developed.   HENT:      Head: Normocephalic.      Right Ear: External ear normal.      Nose: Nose normal.      Mouth/Throat:      Comments: Mouth shows overall mild diffuse xerostomia mild dryness throughout ill fitting dentures mild chronic irritation of soft palate but no  "lesions no leukoplakia no real discharge.  Eyes:      Pupils: Pupils are equal, round, and reactive to light.   Neck:      Thyroid: No thyromegaly.   Cardiovascular:      Rate and Rhythm: Normal rate and regular rhythm.      Heart sounds: Normal heart sounds. No murmur heard.  No friction rub. No gallop.    Pulmonary:      Effort: Pulmonary effort is normal.      Breath sounds: Examination of the right-lower field reveals rhonchi. Examination of the left-lower field reveals rhonchi. Rhonchi present.      Comments: Mild increased AP diameter scant chronic rhonchi breath sounds bases minimally increased expiratory phase chronically  Abdominal:      General: There is no distension.      Palpations: Abdomen is soft. There is no mass.      Tenderness: There is no abdominal tenderness.   Musculoskeletal:         General: Normal range of motion.      Cervical back: Normal range of motion.   Skin:     General: Skin is warm and dry.   Neurological:      Mental Status: He is alert and oriented to person, place, and time.      Deep Tendon Reflexes: Reflexes are normal and symmetric.   Psychiatric:         Mood and Affect: Affect is blunt.         Speech: Speech is delayed and tangential.         Behavior: Behavior is slowed.         Cognition and Memory: He exhibits impaired recent memory.       Results for orders placed or performed in visit on 12/06/21   POC Glucose    Specimen: Blood   Result Value Ref Range    Glucose 103 70 - 130 mg/dL         Visit Vitals  /64   Ht 175.3 cm (69\")   Wt 72.6 kg (160 lb)   BMI 23.63 kg/m²     Body mass index is 23.63 kg/m².  /64   Ht 175.3 cm (69\")   Wt 72.6 kg (160 lb)   BMI 23.63 kg/m²       Assessment/Plan   Diagnoses and all orders for this visit:    1. Xerostomia (Primary)  -     POC Glucose  -     Mouthwashes (Biotene dry mouth) liquid liquid; 2Tablespoon swish gargle and spit hours as needed dry mouth  Dispense: 1000 mL; Refill: 11    2. Medically complex patient    3. " Mixed simple and mucopurulent chronic bronchitis (HCC)  -     guaiFENesin (Mucinex) 600 MG 12 hr tablet; 1 twice daily with large glass water for lungs every day  Dispense: 60 tablet; Refill: 1    Symptoms most consistent multifactorial xerostomia.  Encouraged increasing hydration which is not doing encouraged use of sour candies as needed be encouraged rinsing out mouth after using inhalers.  Counseled on keeping dentures up-to-date.  Biotene mouthwash as needed.  For the lungs counseled using given hydration inhalers and all routinely environmental control measures Mucinex daily recheck regular follow-up

## 2021-12-30 RX ORDER — ROSUVASTATIN CALCIUM 20 MG/1
TABLET, COATED ORAL
Qty: 90 TABLET | Refills: 3 | Status: SHIPPED | OUTPATIENT
Start: 2021-12-30 | End: 2022-09-22

## 2022-01-26 ENCOUNTER — OFFICE VISIT (OUTPATIENT)
Dept: FAMILY MEDICINE CLINIC | Facility: CLINIC | Age: 82
End: 2022-01-26

## 2022-01-26 VITALS
HEIGHT: 69 IN | DIASTOLIC BLOOD PRESSURE: 64 MMHG | BODY MASS INDEX: 23.4 KG/M2 | WEIGHT: 158 LBS | SYSTOLIC BLOOD PRESSURE: 122 MMHG

## 2022-01-26 DIAGNOSIS — Z78.9 MEDICALLY COMPLEX PATIENT: Primary | Chronic | ICD-10-CM

## 2022-01-26 DIAGNOSIS — Z00.00 MEDICARE ANNUAL WELLNESS VISIT, SUBSEQUENT: ICD-10-CM

## 2022-01-26 DIAGNOSIS — E78.2 MIXED HYPERLIPIDEMIA: Chronic | ICD-10-CM

## 2022-01-26 DIAGNOSIS — I73.9 PERIPHERAL ARTERIAL DISEASE: Chronic | ICD-10-CM

## 2022-01-26 DIAGNOSIS — J41.8 MIXED SIMPLE AND MUCOPURULENT CHRONIC BRONCHITIS: Chronic | ICD-10-CM

## 2022-01-26 DIAGNOSIS — G40.909 SEIZURE DISORDER: Chronic | ICD-10-CM

## 2022-01-26 DIAGNOSIS — N18.31 STAGE 3A CHRONIC KIDNEY DISEASE: Chronic | ICD-10-CM

## 2022-01-26 PROCEDURE — 1126F AMNT PAIN NOTED NONE PRSNT: CPT | Performed by: FAMILY MEDICINE

## 2022-01-26 PROCEDURE — G0439 PPPS, SUBSEQ VISIT: HCPCS | Performed by: FAMILY MEDICINE

## 2022-01-26 PROCEDURE — 1159F MED LIST DOCD IN RCRD: CPT | Performed by: FAMILY MEDICINE

## 2022-01-26 PROCEDURE — 99214 OFFICE O/P EST MOD 30 MIN: CPT | Performed by: FAMILY MEDICINE

## 2022-01-26 NOTE — PROGRESS NOTES
Subjective   Xander Chandan Wallace is a 81 y.o. male.  Reevaluation mild hypertension by peripheral vascular disease COPD seizure disorder increased risk of falling medically complex patient.  In interim continues use walker is exclusively falling rate is dropped dramatically.  Is maintain weight and blood pressure and hydration last lab work acceptable.  Medicines have remained the same.  Is up-to-date on all other.  Overall stable.  History of Present Illness   HPI    The following portions of the patient's history were reviewed and updated as appropriate: allergies, current medications, past family history, past medical history, past social history, past surgical history and problem list.    Review of Systems  Review of Systems   Constitutional: Negative for activity change, appetite change, fatigue and unexpected weight change.   HENT: Positive for hearing loss (Age-related). Negative for trouble swallowing and voice change.    Eyes: Negative for redness and visual disturbance.   Respiratory: Negative for cough and wheezing.    Cardiovascular: Negative for chest pain and palpitations.   Gastrointestinal: Negative for abdominal pain, constipation, diarrhea, nausea and vomiting.   Genitourinary: Negative for urgency.   Musculoskeletal: Negative for joint swelling.   Neurological: Positive for weakness (Multifactorial unsteady gait corrected). Negative for syncope and headaches.   Hematological: Negative for adenopathy.   Psychiatric/Behavioral: Negative for sleep disturbance.       Objective   Physical Exam  Physical Exam  Constitutional:       Appearance: He is well-developed.   HENT:      Head: Normocephalic.   Eyes:      Pupils: Pupils are equal, round, and reactive to light.   Neck:      Thyroid: No thyromegaly.   Cardiovascular:      Rate and Rhythm: Normal rate and regular rhythm.      Heart sounds: Normal heart sounds. No murmur heard.  No friction rub. No gallop.    Pulmonary:      Breath sounds: Normal  "breath sounds.   Abdominal:      General: There is no distension.      Palpations: Abdomen is soft. There is no mass.      Tenderness: There is no abdominal tenderness.   Musculoskeletal:         General: Normal range of motion.      Cervical back: Normal range of motion.      Comments: Get up and go 3 to 5 seconds with walker slightly wide-based slow gait but active.   Skin:     General: Skin is warm and dry.   Neurological:      Mental Status: He is alert and oriented to person, place, and time.      Deep Tendon Reflexes: Reflexes are normal and symmetric.   Psychiatric:         Attention and Perception: Attention normal.         Mood and Affect: Mood normal.         Speech: Speech normal.         Behavior: Behavior normal.         Cognition and Memory: Cognition normal.      Comments: Mild hard of hearing           Visit Vitals  /64   Ht 175.3 cm (69\")   Wt 71.7 kg (158 lb)   BMI 23.33 kg/m²     Body mass index is 23.33 kg/m².    /64   Ht 175.3 cm (69\")   Wt 71.7 kg (158 lb)   BMI 23.33 kg/m²     Assessment/Plan   Diagnoses and all orders for this visit:    1. Medically complex patient (Primary)    2. Peripheral arterial disease (HCC)    3. Mixed hyperlipidemia    4. Stage 3a chronic kidney disease (HCC)    5. Mixed simple and mucopurulent chronic bronchitis (HCC)    6. Seizure disorder (HCC)    7. Medicare annual wellness visit, subsequent    : Fall prevention infection prevention hydration.  Continue all medications and follow-ups.  Recheck again in 6 months.  "

## 2022-01-26 NOTE — PROGRESS NOTES
The ABCs of the Annual Wellness Visit  Subsequent Medicare Wellness Visit    Chief Complaint   Patient presents with   • Hypertension     6 month reval      Subjective    History of Present Illness:  Xander Wallace is a 81 y.o. male who presents for a Subsequent Medicare Wellness Visit.    The following portions of the patient's history were reviewed and   updated as appropriate: allergies, current medications, past family history, past medical history, past social history, past surgical history and problem list.    Compared to one year ago, the patient feels his physical   health is the same.    Compared to one year ago, the patient feels his mental   health is the same.    Recent Hospitalizations:  He was not admitted to the hospital during the last year.       Current Medical Providers:  Patient Care Team:  Deni Garcia MD as PCP - General (Family Medicine)  Homero Pearl MD as Surgeon (General Surgery)  Judith Conti MD as Consulting Physician (Gastroenterology)    Outpatient Medications Prior to Visit   Medication Sig Dispense Refill   • albuterol (ACCUNEB) 1.25 MG/3ML nebulizer solution Take 3 mL by nebulization Every 6 (Six) Hours As Needed for Wheezing. 120 each 3   • albuterol sulfate  (90 Base) MCG/ACT inhaler Inhale 2 puffs Every 4 (Four) Hours As Needed for Wheezing. for wheezing 54 g 11   • B Complex-Biotin-FA (SUPER B-COMPLEX) tablet Take  by mouth.     • budesonide-formoterol (SYMBICORT) 160-4.5 MCG/ACT inhaler Inhale 2 puffs 2 (Two) Times a Day.     • cilostazol (PLETAL) 50 MG tablet Take 1 tablet by mouth 2 (Two) Times a Day. 60 tablet 3   • clopidogrel (PLAVIX) 75 MG tablet TAKE 1 TABLET DAILY. 90 tablet 3   • gabapentin (NEURONTIN) 600 MG tablet Take 600 mg by mouth 3 (three) times a day.     • guaiFENesin (Mucinex) 600 MG 12 hr tablet 1 twice daily with large glass water for lungs every day 60 tablet 1   • lamoTRIgine (LaMICtal) 200 MG tablet Take 200 mg by mouth 2  (Two) Times a Day.     • losartan (COZAAR) 25 MG tablet 25 mg Daily.     • Mouthwashes (Biotene dry mouth) liquid liquid 2Tablespoon swish gargle and spit hours as needed dry mouth 1000 mL 11   • Multiple Vitamins-Minerals (MULTIVITAMIN WITH MINERALS) tablet tablet Take 1 tablet by mouth Daily.     • Omega-3 Fatty Acids (FISH OIL) 1000 MG capsule capsule Take  by mouth daily with breakfast.     • rosuvastatin (CRESTOR) 20 MG tablet TAKE 1 TABLET DAILY. 90 tablet 3   • tamsulosin (FLOMAX) 0.4 MG capsule 24 hr capsule Take 1 capsule by mouth Every Night. 90 capsule 3   • vitamin C (ASCORBIC ACID) 500 MG tablet Take 500 mg by mouth 2 (two) times a day.       No facility-administered medications prior to visit.       No opioid medication identified on active medication list. I have reviewed chart for other potential  high risk medication/s and harmful drug interactions in the elderly.          Aspirin is not on active medication list.  Aspirin use is indicated based on review of current medical condition/s. Pros and cons of this therapy have been discussed with this patient. Benefits of this medication outweigh potential harm.  Patient has been instructed to start taking this medication..    Patient Active Problem List   Diagnosis   • Seizure disorder (HCC)   • Hypertension   • Coronary artery disease involving coronary bypass graft   • COPD (chronic obstructive pulmonary disease) (HCC)   • Hyperlipidemia   • Peripheral arterial disease (HCC)   • Sleep apnea   • Asthma   • Benign prostatic hyperplasia with lower urinary tract symptoms   • Coronary artery disease   • Cardiac murmur   • Symptomatic anemia   • Iron deficiency anemia due to chronic blood loss   • Stage 3a chronic kidney disease (HCC)   • Nonrheumatic aortic valve stenosis   • History of aortic valve repair   • Medically complex patient     Advance Care Planning  Advance Directive is not on file.  ACP discussion was held with the patient during this visit.  "Patient has an advance directive (not in EMR), copy requested. Patient does not have an advance directive, information provided.          Objective    Vitals:    01/26/22 1008   BP: 122/64   Weight: 71.7 kg (158 lb)   Height: 175.3 cm (69\")   PainSc: 0-No pain     BMI Readings from Last 1 Encounters:   01/26/22 23.33 kg/m²   BMI is within normal parameters. No follow-up required.    Does the patient have evidence of cognitive impairment? Age and hard of hearing related    Physical Exam            HEALTH RISK ASSESSMENT    Smoking Status:  Social History     Tobacco Use   Smoking Status Former Smoker   Smokeless Tobacco Never Used     Alcohol Consumption:  Social History     Substance and Sexual Activity   Alcohol Use No     Fall Risk Screen:    STEADI Fall Risk Assessment was completed, and patient is at HIGH risk for falls. Assessment completed on:1/26/2022    Depression Screening:  PHQ-2/PHQ-9 Depression Screening 1/26/2022   Little interest or pleasure in doing things 0   Feeling down, depressed, or hopeless 0   Total Score 0       Health Habits and Functional and Cognitive Screening:  No flowsheet data found.    Age-appropriate Screening Schedule:  Refer to the list below for future screening recommendations based on patient's age, sex and/or medical conditions. Orders for these recommended tests are listed in the plan section. The patient has been provided with a written plan.    Health Maintenance   Topic Date Due   • LIPID PANEL  10/18/2022   • INFLUENZA VACCINE  Completed   • HEMOGLOBIN A1C  Discontinued   • DIABETIC EYE EXAM  Discontinued   • URINE MICROALBUMIN  Discontinued   • TDAP/TD VACCINES  Discontinued   • ZOSTER VACCINE  Discontinued              Assessment/Plan   CMS Preventative Services Quick Reference  Risk Factors Identified During Encounter  Fall Risk-High or Moderate  The above risks/problems have been discussed with the patient.  Follow up actions/plans if indicated are seen below in the " Assessment/Plan Section.  Pertinent information has been shared with the patient in the After Visit Summary.    Diagnoses and all orders for this visit:    1. Medically complex patient (Primary)    2. Peripheral arterial disease (HCC)    3. Mixed hyperlipidemia    4. Stage 3a chronic kidney disease (HCC)    5. Mixed simple and mucopurulent chronic bronchitis (HCC)    6. Seizure disorder (HCC)    7. Medicare annual wellness visit, subsequent        Follow Up:   No follow-ups on file.     An After Visit Summary and PPPS were made available to the patient.                 The patient has a history of falls. I did complete a risk assessment for falls. A plan of care for falls was patient continue use walkers and hydration falling frequency is greatly decreased

## 2022-02-15 RX ORDER — CLOPIDOGREL BISULFATE 75 MG/1
TABLET ORAL
Qty: 90 TABLET | Refills: 2 | Status: SHIPPED | OUTPATIENT
Start: 2022-02-15 | End: 2022-11-14

## 2022-07-11 ENCOUNTER — OFFICE VISIT (OUTPATIENT)
Dept: CARDIOLOGY | Facility: CLINIC | Age: 82
End: 2022-07-11

## 2022-07-11 VITALS
SYSTOLIC BLOOD PRESSURE: 120 MMHG | BODY MASS INDEX: 23.73 KG/M2 | HEART RATE: 61 BPM | DIASTOLIC BLOOD PRESSURE: 52 MMHG | HEIGHT: 69 IN | OXYGEN SATURATION: 96 % | WEIGHT: 160.2 LBS

## 2022-07-11 DIAGNOSIS — R00.1 BRADYCARDIA: Primary | ICD-10-CM

## 2022-07-11 PROCEDURE — 99214 OFFICE O/P EST MOD 30 MIN: CPT | Performed by: INTERNAL MEDICINE

## 2022-07-11 PROCEDURE — 93000 ELECTROCARDIOGRAM COMPLETE: CPT | Performed by: INTERNAL MEDICINE

## 2022-07-11 NOTE — PROGRESS NOTES
[facility] Cardiology  OFFICE NOTE    Cardiovascular Medicine  Mike Montes M.D., RPVI         No referring provider defined for this encounter.    Coronary Artery Disease    Hypertension      This is a 82 y.o. male with:       12/18/2018  1. Coronary artery disease involving coronary bypass graft of native heart without angina pectoris    2. Chronic renal insufficiency, stage 2 (mild)    3. Mixed hyperlipidemia    4. Peripheral arterial disease (CMS/HCC)    5. Essential hypertension    6.  Aortic stenosis status post TAVR     Chief complaint -Follow-up CAD        History of present Illness- 82 y.o.yr old gentleman with history of coronary disease prior CABG with SVG to diagonal, LIMA to LAD, subsequent PCI to his left main left circumflex by Dr. Otero.  In the past and it has history of peripheral vascular disease with total occlusion of the SFA.  Patient had failed attempted PTA of the right SFA with antegrade retrograde approach. He has claudication pain With walking  2-3 blocks, but no rest pain but does not want to do any PTA at this time and he will continue to walk as much as he can.      Patient was found to have aortic stenosis on echocardiogram in December.  Repeat cardiac cath in March showed patent LIMA to LAD and SVG to diagonal.  Has stents in the left main left circumflex are widely patent.  He was referred for TAVR to [place].    Underwent TAVR with a 26 mm S3 valve via femoral cutdown without any complications.  Repeat echocardiogram post TAVR showed no formal functioning valve with a mean gradient of 11 mmHg with a peak gradient of 20 mmHg.    He was slightly bradycardic so was taken off his Coreg  Continue to have claudications on right side  Reported significant imrovement in his symptoms post TAVR    Patient was seen by Dr. Bustamante recently, was found to have bradycardia with heart rate in the 40s the patient was referred here for further evaluation.    07/11/2022:  Has  done well since last visit but denying any chest pain.  Shortness of breath is at baseline.  No syncope or lightheadedness.        Review of Systems - ROS  Constitution: Negative for weakness, weight gain and weight loss.   HENT: Negative for congestion.    Eyes: Negative for blurred vision.   Cardiovascular: As mentioned above  Respiratory: Negative for cough and hemoptysis.    Endocrine: Negative for polydipsia and polyuria.   Hematologic/Lymphatic: Negative for bleeding problem. Does not bruise/bleed easily.   Skin: Negative for flushing.   Musculoskeletal: Negative for neck pain and stiffness.   Gastrointestinal: Negative for abdominal pain, diarrhea, jaundice, melena, nausea and vomiting.   Genitourinary: Negative for dysuria and hematuria.   Neurological: Negative for dizziness, focal weakness and numbness.   Psychiatric/Behavioral: Negative for altered mental status and depression.          All other systems were reviewed and were negative.    family history includes Cancer in his father.     reports that he has quit smoking. He has never used smokeless tobacco. He reports that he does not drink alcohol and does not use drugs.    Allergies   Allergen Reactions   • Doxycycline GI Intolerance   • Elemental Sulfur GI Intolerance     Stomach issues and rash    • Methylphenidate Derivatives    • Theophyllines Unknown - Low Severity     Pt states he didn't remember        • Amoxicillin Rash   • Ceftin [Cefuroxime] Rash   • Nystatin Rash         Current Outpatient Medications:   •  albuterol (ACCUNEB) 1.25 MG/3ML nebulizer solution, Take 3 mL by nebulization Every 6 (Six) Hours As Needed for Wheezing., Disp: 120 each, Rfl: 3  •  albuterol sulfate  (90 Base) MCG/ACT inhaler, Inhale 2 puffs Every 4 (Four) Hours As Needed for Wheezing. for wheezing, Disp: 54 g, Rfl: 11  •  B Complex-Biotin-FA (SUPER B-COMPLEX) tablet, Take  by mouth., Disp: , Rfl:   •  budesonide-formoterol (SYMBICORT) 160-4.5 MCG/ACT inhaler,  "Inhale 2 puffs 2 (Two) Times a Day., Disp: , Rfl:   •  clopidogrel (PLAVIX) 75 MG tablet, TAKE ONE TABLET BY MOUTH EVERY DAY, Disp: 90 tablet, Rfl: 2  •  gabapentin (NEURONTIN) 600 MG tablet, Take 600 mg by mouth 3 (three) times a day., Disp: , Rfl:   •  guaiFENesin (Mucinex) 600 MG 12 hr tablet, 1 twice daily with large glass water for lungs every day, Disp: 60 tablet, Rfl: 1  •  lamoTRIgine (LaMICtal) 200 MG tablet, Take 200 mg by mouth 2 (Two) Times a Day., Disp: , Rfl:   •  losartan (COZAAR) 25 MG tablet, 25 mg Daily., Disp: , Rfl:   •  Mouthwashes (Biotene dry mouth) liquid liquid, 2Tablespoon swish gargle and spit hours as needed dry mouth, Disp: 1000 mL, Rfl: 11  •  Multiple Vitamins-Minerals (MULTIVITAMIN WITH MINERALS) tablet tablet, Take 1 tablet by mouth Daily., Disp: , Rfl:   •  Omega-3 Fatty Acids (FISH OIL) 1000 MG capsule capsule, Take  by mouth daily with breakfast., Disp: , Rfl:   •  rosuvastatin (CRESTOR) 20 MG tablet, TAKE 1 TABLET DAILY., Disp: 90 tablet, Rfl: 3  •  tamsulosin (FLOMAX) 0.4 MG capsule 24 hr capsule, Take 1 capsule by mouth Every Night., Disp: 90 capsule, Rfl: 3  •  vitamin C (ASCORBIC ACID) 500 MG tablet, Take 500 mg by mouth 2 (two) times a day., Disp: , Rfl:   •  cilostazol (PLETAL) 50 MG tablet, Take 1 tablet by mouth 2 (Two) Times a Day., Disp: 60 tablet, Rfl: 3    Physical Exam:  Vitals:    07/11/22 1313   BP: 120/52   Pulse: 61   SpO2: 96%   Weight: 72.7 kg (160 lb 3.2 oz)   Height: 175.3 cm (69\")   PainSc: 0-No pain     Current Pain Level: none  Pulse Ox: Normal        on room air  General: alert, appears stated age and cooperative     Body Habitus: well-nourished    HEENT: Head: Normocephalic, no lesions, without obvious abnormality. No arcus senilis, xanthelasma or xanthomas.    Neuro: alert, oriented x3  Pulses: 2+ and symmetric  JVP: Volume/Pulsation:       Normal.  Normal waveforms.   Appropriate inspiratory decrease.  No Kussmaul's. No Rupal's.   Carotid Exam: no " bruit normal pulsation bilaterally    Carotid Volume: normal.                     Respirations: no increased work of breathing            Chest:  Normal              Pulmonary:Normal       Precordium: Normal impulses. P2 is not palpable.  RV Heave: absent  LV Heave: absent  [place]:  normal size and placement  Palpable S4: absent.  Heart rate: normal    Heart Rhythm: regular             Systolic murmur audible in aortic area                         Heart Sounds: S1: normal    S2: normal  S3: absent                               S4: absent  Ejection systolic murmur audible.    Pericardial Rub:  Absent: .                 Abdomen:   Appearance: normal .  Palpation: Soft, non-tender to palpation, bowel sounds positive in all four quadrants; no guarding or rebound tenderness  Extremity: no edema.   LE Skin: no rashes  LE Hair:  normal  LE Pulses: well perfused with normal pulses in the distal extremities  Pallor on elevation: Absent. Rubor on dependency: None         DATA REVIEWED:     EKG. I personally reviewed and interpreted the EKG.  Sinus rhythm with nonspecific ST-T changes.      ECG/EMG Results (all)     None        ---------------------------------------------------  TTE/ADAIR:  Results for orders placed in visit on 06/22/20    Adult Transthoracic Echo Complete W/ Cont if Necessary Per Protocol    Interpretation Summary  · Left ventricular ejection fraction appears to be 56 - 60%. Left ventricular systolic function is normal.  · Left ventricular diastolic function is consistent with (grade II w/high LAP) pseudonormalization.  · Left ventricular wall thickness is consistent with concentric hypertrophy.  · Estimated right ventricular systolic pressure from tricuspid regurgitation is normal (<35 mmHg).  · Left atrial volume is moderately increased.  · Moderate mitral annular calcification is present. Mild mitral valve regurgitation is present. Mild mitral valve stenosis is present with functional MAC.  · The aortic valve  is abnormal in structure. There is moderate calcification of the aortic valve mainly affecting the non-coronary, left coronary and right coronary cusp(s). The aortic valve appears trileaflet. No aortic valve regurgitation is present. Moderate restriction to aortic valve opening. Aortic valve peak/mean gradient of 76/42mmHg. SHAHIDA of 0.64cm2 by continuity findings c/w severe aortic stenosis.        --------------------------------------------------------------------------------------------------  LABS:     The CVD Risk score (Doris et al., 2008) failed to calculate for the following reasons:    The 2008 CVD risk score is only valid for ages 30 to 74    The patient has a prior MI, stroke, CHF, or peripheral vascular disease diagnosis         Lab Results   Component Value Date    GLUCOSE 104 (H) 10/18/2021    BUN 19 10/18/2021    CREATININE 1.23 10/18/2021    EGFRIFNONA 56 (L) 10/18/2021    BCR 15.4 10/18/2021    K 4.8 10/18/2021    CO2 24.9 10/18/2021    CALCIUM 9.2 10/18/2021    ALBUMIN 4.30 10/18/2021    AST 21 10/18/2021    ALT 15 10/18/2021     Lab Results   Component Value Date    WBC 6.21 10/18/2021    HGB 10.9 (L) 10/18/2021    HCT 33.0 (L) 10/18/2021    MCV 88.7 10/18/2021     10/18/2021     Lab Results   Component Value Date    CHOL 135 10/18/2021    CHLPL 114 05/04/2016    TRIG 57 10/18/2021    HDL 71 (H) 10/18/2021    LDL 52 10/18/2021     Lab Results   Component Value Date    TSH 1.610 11/05/2019     Lab Results   Component Value Date    CKTOTAL 69 02/22/2016    CKMB 2.8 02/22/2016    TROPONINI 1.350 (H) 02/24/2016     Lab Results   Component Value Date    HGBA1C 5.3 05/04/2016     No results found for: DDIMER  Lab Results   Component Value Date    ALT 15 10/18/2021     Lab Results   Component Value Date    HGBA1C 5.3 05/04/2016     Lab Results   Component Value Date    CREATININE 1.23 10/18/2021     Lab Results   Component Value Date    IRON 16 (L) 11/05/2019    TIBC 402 11/05/2019     Lab  Results   Component Value Date    INR 1.01 03/24/2021    INR 1.03 11/05/2019    INR 0.9 08/23/2016    PROTIME 13.7 03/24/2021    PROTIME 13.3 11/05/2019    PROTIME 12.4 08/23/2016       Assessment/Plan     1. CAD status post CABG in the past, doing well no chest pain ,continue the Plavix as he is bruising easily.  He is on plavix/statin  Cardiac cath prior to his TAVR showed patent LIMA to LAD SVG diagonal  Stents in ostial circumflex were patent.    2. Peripheral vascular disease-status post occlusion of the SFA has no critical limb ischemia, still walking but does have claudication   We will add cilostazol.  I discussed options of relook at his SFA for endovascular intervention versus surgical intervention.  Patient did not want to proceed with any invasive evaluation this time.     3. Hypertension was well controlled with the losartan 25 mg.  His carvedilol was stopped because of sinus bradycardia.  Blood pressure is elevated in office today.    4. COPD with wheezing on Atrovent and Dulera .      5.  Severe Aortic stenosis:  Status post TAVR with 26 mm S3 valve via femoral cutdown.  Echocardiogram post TAVR with minimal gradients.    6.  Sinus bradycardia:  He has known sinus bradycardia, his carvedilol has already been stopped.  Previous TSH was normal.  He is asymptomatic.    Heart monitor was without any evidence of high degree AV block's.  He did have frequent PVCs with which she was symptomatic from bigeminy.          Prevention:  Patient's Body mass index is 23.66 kg/m². BMI is above normal parameters. Recommendations include: exercise counseling.      Xander VAHID Wallace is an ex smoker    AAA Screening:   Not needed            This document has been electronically signed by Mike Montes MD on July 11, 2022 13:42 CDT

## 2022-07-25 ENCOUNTER — OFFICE VISIT (OUTPATIENT)
Dept: FAMILY MEDICINE CLINIC | Facility: CLINIC | Age: 82
End: 2022-07-25

## 2022-07-25 VITALS
DIASTOLIC BLOOD PRESSURE: 78 MMHG | WEIGHT: 159.8 LBS | BODY MASS INDEX: 23.67 KG/M2 | OXYGEN SATURATION: 98 % | RESPIRATION RATE: 18 BRPM | HEART RATE: 78 BPM | HEIGHT: 69 IN | SYSTOLIC BLOOD PRESSURE: 126 MMHG

## 2022-07-25 DIAGNOSIS — N18.31 STAGE 3A CHRONIC KIDNEY DISEASE: Chronic | ICD-10-CM

## 2022-07-25 DIAGNOSIS — I25.708 CORONARY ARTERY DISEASE INVOLVING CORONARY BYPASS GRAFT OF NATIVE HEART WITH OTHER FORMS OF ANGINA PECTORIS: ICD-10-CM

## 2022-07-25 DIAGNOSIS — D50.0 IRON DEFICIENCY ANEMIA DUE TO CHRONIC BLOOD LOSS: Chronic | ICD-10-CM

## 2022-07-25 DIAGNOSIS — G40.909 SEIZURE DISORDER: Chronic | ICD-10-CM

## 2022-07-25 DIAGNOSIS — I10 PRIMARY HYPERTENSION: Primary | Chronic | ICD-10-CM

## 2022-07-25 DIAGNOSIS — Z78.9 MEDICALLY COMPLEX PATIENT: Chronic | ICD-10-CM

## 2022-07-25 DIAGNOSIS — J41.8 MIXED SIMPLE AND MUCOPURULENT CHRONIC BRONCHITIS: Chronic | ICD-10-CM

## 2022-07-25 PROCEDURE — 99214 OFFICE O/P EST MOD 30 MIN: CPT | Performed by: FAMILY MEDICINE

## 2022-07-25 NOTE — PROGRESS NOTES
Subjective   Xander Wallace is a 82 y.o. male.  Reevaluation medically complex patient COPD seizure disorder vascular disease hypertension diagnoses below in the interim continues to do well using his Rollator and walkers wherever he goes no recent falling.  Staying hydrated.  Blood pressure weight are holding.  Continues to visit subspecialist including neurology.  Has had all for COVID boosters.  Counseled on flu vaccine in the fall.    History of Present Illness   HPI    The following portions of the patient's history were reviewed and updated as appropriate: allergies, current medications, past family history, past medical history, past social history, past surgical history and problem list.    Review of Systems  Review of Systems   Constitutional: Negative for activity change, appetite change, fatigue and unexpected weight change.   HENT: Negative for trouble swallowing and voice change.    Eyes: Negative for redness and visual disturbance.   Respiratory: Positive for shortness of breath (Whether related). Negative for cough and wheezing.    Cardiovascular: Negative for chest pain and palpitations.   Gastrointestinal: Negative for abdominal pain, constipation, diarrhea, nausea and vomiting.   Genitourinary: Negative for urgency.   Musculoskeletal: Positive for arthralgias. Negative for joint swelling.   Neurological: Negative for syncope and headaches.   Hematological: Negative for adenopathy.   Psychiatric/Behavioral: Negative for sleep disturbance.       Objective   Physical Exam  Physical Exam  Constitutional:       Appearance: He is well-developed.   HENT:      Head: Normocephalic.   Eyes:      Pupils: Pupils are equal, round, and reactive to light.   Neck:      Thyroid: No thyromegaly.   Cardiovascular:      Rate and Rhythm: Normal rate and regular rhythm.      Heart sounds: Normal heart sounds. No murmur heard.    No friction rub. No gallop.   Pulmonary:      Breath sounds: Normal breath sounds.  "  Abdominal:      General: There is no distension.      Palpations: Abdomen is soft. There is no mass.      Tenderness: There is no abdominal tenderness.   Musculoskeletal:         General: Normal range of motion.      Cervical back: Normal range of motion.      Comments: No edema mild hair loss 1+ pulses get up and go 3 to 5 seconds slightly shuffling wide-based gait with walker.   Skin:     General: Skin is warm and dry.   Neurological:      Mental Status: He is alert and oriented to person, place, and time.      Deep Tendon Reflexes: Reflexes are normal and symmetric.   Psychiatric:         Attention and Perception: Attention normal.         Mood and Affect: Mood normal.         Speech: Speech normal.         Behavior: Behavior normal.         Thought Content: Thought content normal.         Cognition and Memory: Cognition normal.           Visit Vitals  /78 (BP Location: Left arm, Patient Position: Sitting, Cuff Size: Large Adult)   Pulse 78   Resp 18   Ht 175.3 cm (69.02\")   Wt 72.5 kg (159 lb 12.8 oz)   SpO2 98%   BMI 23.59 kg/m²     Body mass index is 23.59 kg/m².    /78 (BP Location: Left arm, Patient Position: Sitting, Cuff Size: Large Adult)   Pulse 78   Resp 18   Ht 175.3 cm (69.02\")   Wt 72.5 kg (159 lb 12.8 oz)   SpO2 98%   BMI 23.59 kg/m²   Assessment/Plan   Diagnoses and all orders for this visit:    1. Primary hypertension (Primary)  -     Comprehensive Metabolic Panel; Future  -     Magnesium; Future    2. Medically complex patient    3. Stage 3a chronic kidney disease (HCC)  -     CBC (No Diff); Future    4. Seizure disorder (HCC)    5. Mixed simple and mucopurulent chronic bronchitis (HCC)    6. Iron deficiency anemia due to chronic blood loss    7. Coronary artery disease involving coronary bypass graft of native heart with other forms of angina pectoris (HCC)  -     Lipid Panel With LDL / HDL Ratio; Future    Counseled mainly on fall prevention as we have done in the past " counseled increasing hydration vaccinations in the fall.  Counseled following up with all subspecialist.  Continue medicines as outlined recheck 6 months all lab prior will also be time for subsequent Medicare   hard copy, drawn during this pregnancy

## 2022-07-29 LAB
QT INTERVAL: 488 MS
QTC INTERVAL: 462 MS

## 2022-09-22 RX ORDER — ROSUVASTATIN CALCIUM 20 MG/1
TABLET, COATED ORAL
Qty: 90 TABLET | Refills: 0 | Status: SHIPPED | OUTPATIENT
Start: 2022-09-22 | End: 2022-12-28 | Stop reason: SDUPTHER

## 2022-11-14 RX ORDER — CLOPIDOGREL BISULFATE 75 MG/1
TABLET ORAL
Qty: 90 TABLET | Refills: 3 | Status: SHIPPED | OUTPATIENT
Start: 2022-11-14

## 2022-12-28 RX ORDER — ROSUVASTATIN CALCIUM 20 MG/1
20 TABLET, COATED ORAL DAILY
Qty: 90 TABLET | Refills: 3 | Status: SHIPPED | OUTPATIENT
Start: 2022-12-28

## 2023-01-23 ENCOUNTER — APPOINTMENT (OUTPATIENT)
Dept: LAB | Facility: HOSPITAL | Age: 83
End: 2023-01-23
Payer: MEDICARE

## 2023-01-23 ENCOUNTER — LAB (OUTPATIENT)
Dept: LAB | Facility: HOSPITAL | Age: 83
End: 2023-01-23
Payer: MEDICARE

## 2023-01-23 DIAGNOSIS — I10 PRIMARY HYPERTENSION: Chronic | ICD-10-CM

## 2023-01-23 DIAGNOSIS — I25.708 CORONARY ARTERY DISEASE INVOLVING CORONARY BYPASS GRAFT OF NATIVE HEART WITH OTHER FORMS OF ANGINA PECTORIS: ICD-10-CM

## 2023-01-23 DIAGNOSIS — N18.31 STAGE 3A CHRONIC KIDNEY DISEASE: Chronic | ICD-10-CM

## 2023-01-23 LAB
ALBUMIN SERPL-MCNC: 4.2 G/DL (ref 3.5–5.2)
ALBUMIN/GLOB SERPL: 1.9 G/DL
ALP SERPL-CCNC: 94 U/L (ref 39–117)
ALT SERPL W P-5'-P-CCNC: 13 U/L (ref 1–41)
ANION GAP SERPL CALCULATED.3IONS-SCNC: 7 MMOL/L (ref 5–15)
AST SERPL-CCNC: 20 U/L (ref 1–40)
BILIRUB SERPL-MCNC: 0.3 MG/DL (ref 0–1.2)
BUN SERPL-MCNC: 25 MG/DL (ref 8–23)
BUN/CREAT SERPL: 16.2 (ref 7–25)
CALCIUM SPEC-SCNC: 9.3 MG/DL (ref 8.6–10.5)
CHLORIDE SERPL-SCNC: 105 MMOL/L (ref 98–107)
CHOLEST SERPL-MCNC: 134 MG/DL (ref 0–200)
CO2 SERPL-SCNC: 28 MMOL/L (ref 22–29)
CREAT SERPL-MCNC: 1.54 MG/DL (ref 0.76–1.27)
DEPRECATED RDW RBC AUTO: 39.5 FL (ref 37–54)
EGFRCR SERPLBLD CKD-EPI 2021: 44.2 ML/MIN/1.73
ERYTHROCYTE [DISTWIDTH] IN BLOOD BY AUTOMATED COUNT: 12.2 % (ref 12.3–15.4)
GLOBULIN UR ELPH-MCNC: 2.2 GM/DL
GLUCOSE SERPL-MCNC: 81 MG/DL (ref 65–99)
HCT VFR BLD AUTO: 35.3 % (ref 37.5–51)
HDLC SERPL-MCNC: 65 MG/DL (ref 40–60)
HGB BLD-MCNC: 11.8 G/DL (ref 13–17.7)
LDLC SERPL CALC-MCNC: 53 MG/DL (ref 0–100)
LDLC/HDLC SERPL: 0.81 {RATIO}
MAGNESIUM SERPL-MCNC: 2.1 MG/DL (ref 1.6–2.4)
MCH RBC QN AUTO: 30 PG (ref 26.6–33)
MCHC RBC AUTO-ENTMCNC: 33.4 G/DL (ref 31.5–35.7)
MCV RBC AUTO: 89.8 FL (ref 79–97)
PLATELET # BLD AUTO: 201 10*3/MM3 (ref 140–450)
PMV BLD AUTO: 10.5 FL (ref 6–12)
POTASSIUM SERPL-SCNC: 4.5 MMOL/L (ref 3.5–5.2)
PROT SERPL-MCNC: 6.4 G/DL (ref 6–8.5)
RBC # BLD AUTO: 3.93 10*6/MM3 (ref 4.14–5.8)
SODIUM SERPL-SCNC: 140 MMOL/L (ref 136–145)
TRIGL SERPL-MCNC: 83 MG/DL (ref 0–150)
VLDLC SERPL-MCNC: 16 MG/DL (ref 5–40)
WBC NRBC COR # BLD: 5.58 10*3/MM3 (ref 3.4–10.8)

## 2023-01-23 PROCEDURE — 83735 ASSAY OF MAGNESIUM: CPT

## 2023-01-23 PROCEDURE — 85027 COMPLETE CBC AUTOMATED: CPT

## 2023-01-23 PROCEDURE — 80061 LIPID PANEL: CPT

## 2023-01-23 PROCEDURE — 80053 COMPREHEN METABOLIC PANEL: CPT

## 2023-01-25 ENCOUNTER — OFFICE VISIT (OUTPATIENT)
Dept: FAMILY MEDICINE CLINIC | Facility: CLINIC | Age: 83
End: 2023-01-25
Payer: MEDICARE

## 2023-01-25 VITALS
SYSTOLIC BLOOD PRESSURE: 122 MMHG | BODY MASS INDEX: 24.44 KG/M2 | HEIGHT: 69 IN | WEIGHT: 165 LBS | DIASTOLIC BLOOD PRESSURE: 78 MMHG

## 2023-01-25 DIAGNOSIS — G40.909 SEIZURE DISORDER: Chronic | ICD-10-CM

## 2023-01-25 DIAGNOSIS — Z91.81 AT HIGH RISK FOR INJURY RELATED TO FALL: ICD-10-CM

## 2023-01-25 DIAGNOSIS — Z78.9 MEDICALLY COMPLEX PATIENT: Chronic | ICD-10-CM

## 2023-01-25 DIAGNOSIS — I10 PRIMARY HYPERTENSION: Chronic | ICD-10-CM

## 2023-01-25 DIAGNOSIS — Z00.00 MEDICARE ANNUAL WELLNESS VISIT, SUBSEQUENT: ICD-10-CM

## 2023-01-25 DIAGNOSIS — N18.31 STAGE 3A CHRONIC KIDNEY DISEASE: Chronic | ICD-10-CM

## 2023-01-25 DIAGNOSIS — N40.1 BENIGN PROSTATIC HYPERPLASIA WITH LOWER URINARY TRACT SYMPTOMS, SYMPTOM DETAILS UNSPECIFIED: Primary | Chronic | ICD-10-CM

## 2023-01-25 DIAGNOSIS — E78.2 MIXED HYPERLIPIDEMIA: Chronic | ICD-10-CM

## 2023-01-25 PROBLEM — D64.9 SYMPTOMATIC ANEMIA: Chronic | Status: RESOLVED | Noted: 2019-11-05 | Resolved: 2023-01-25

## 2023-01-25 PROCEDURE — 1170F FXNL STATUS ASSESSED: CPT | Performed by: FAMILY MEDICINE

## 2023-01-25 PROCEDURE — 1125F AMNT PAIN NOTED PAIN PRSNT: CPT | Performed by: FAMILY MEDICINE

## 2023-01-25 PROCEDURE — 99214 OFFICE O/P EST MOD 30 MIN: CPT | Performed by: FAMILY MEDICINE

## 2023-01-25 PROCEDURE — G0439 PPPS, SUBSEQ VISIT: HCPCS | Performed by: FAMILY MEDICINE

## 2023-01-25 PROCEDURE — 1159F MED LIST DOCD IN RCRD: CPT | Performed by: FAMILY MEDICINE

## 2023-01-25 NOTE — PROGRESS NOTES
Subjective   Xander Wallace is a 82 y.o. male.  Reevaluation medically complex patient coronary disease vascular disease seizure disorder hypertension hyperlipidemia history of intermittent COPD BPH with symptomatology.  In the interim lab work shows actual mild improvement in his anemia renal function remaining stable with any holding of 1.5.  Cholesterol below normal.  Continues to use a walker with good result.  BPH symptoms have improved with institution of Flomax.  No falling.  Leg pain remains about the same controlled somewhat on gabapentin    History of Present Illness   Hypertension  Pertinent negatives include no chest pain, headaches or palpitations.       The following portions of the patient's history were reviewed and updated as appropriate: allergies, current medications, past family history, past medical history, past social history, past surgical history and problem list.    Review of Systems  Review of Systems   Constitutional: Negative for activity change, appetite change, fatigue and unexpected weight change.   HENT: Negative for trouble swallowing and voice change.    Eyes: Negative for redness and visual disturbance.   Respiratory: Negative for cough and wheezing.    Cardiovascular: Negative for chest pain and palpitations.   Gastrointestinal: Negative for abdominal pain, constipation, diarrhea, nausea and vomiting.   Genitourinary: Negative for urgency.   Musculoskeletal: Positive for gait problem. Negative for joint swelling.   Neurological: Negative for syncope and headaches.   Hematological: Negative for adenopathy.   Psychiatric/Behavioral: Positive for decreased concentration. Negative for sleep disturbance.       Objective   Physical Exam  Physical Exam  Constitutional:       Appearance: He is well-developed.   HENT:      Head: Normocephalic.   Eyes:      Pupils: Pupils are equal, round, and reactive to light.   Neck:      Thyroid: No thyromegaly.   Cardiovascular:      Rate and  Rhythm: Normal rate and regular rhythm.      Heart sounds: Normal heart sounds. No murmur heard.    No friction rub. No gallop.   Pulmonary:      Breath sounds: Normal breath sounds.   Abdominal:      General: There is no distension.      Palpations: Abdomen is soft. There is no mass.      Tenderness: There is no abdominal tenderness.   Musculoskeletal:         General: Normal range of motion.      Cervical back: Normal range of motion.      Comments: No edema 2+ pulses get up and go with walker still takes about 10 seconds.  Slightly wide-based gait   Skin:     General: Skin is warm and dry.   Neurological:      Mental Status: He is alert and oriented to person, place, and time.      Deep Tendon Reflexes: Reflexes are normal and symmetric.   Psychiatric:         Mood and Affect: Mood normal.         Speech: Speech normal.         Behavior: Behavior is slowed.         Cognition and Memory: He exhibits impaired recent memory. He does not exhibit impaired remote memory.       Results for orders placed or performed in visit on 01/23/23   CBC (No Diff)    Specimen: Blood   Result Value Ref Range    WBC 5.58 3.40 - 10.80 10*3/mm3    RBC 3.93 (L) 4.14 - 5.80 10*6/mm3    Hemoglobin 11.8 (L) 13.0 - 17.7 g/dL    Hematocrit 35.3 (L) 37.5 - 51.0 %    MCV 89.8 79.0 - 97.0 fL    MCH 30.0 26.6 - 33.0 pg    MCHC 33.4 31.5 - 35.7 g/dL    RDW 12.2 (L) 12.3 - 15.4 %    RDW-SD 39.5 37.0 - 54.0 fl    MPV 10.5 6.0 - 12.0 fL    Platelets 201 140 - 450 10*3/mm3   Comprehensive Metabolic Panel    Specimen: Blood   Result Value Ref Range    Glucose 81 65 - 99 mg/dL    BUN 25 (H) 8 - 23 mg/dL    Creatinine 1.54 (H) 0.76 - 1.27 mg/dL    Sodium 140 136 - 145 mmol/L    Potassium 4.5 3.5 - 5.2 mmol/L    Chloride 105 98 - 107 mmol/L    CO2 28.0 22.0 - 29.0 mmol/L    Calcium 9.3 8.6 - 10.5 mg/dL    Total Protein 6.4 6.0 - 8.5 g/dL    Albumin 4.2 3.5 - 5.2 g/dL    ALT (SGPT) 13 1 - 41 U/L    AST (SGOT) 20 1 - 40 U/L    Alkaline Phosphatase 94 39 -  "117 U/L    Total Bilirubin 0.3 0.0 - 1.2 mg/dL    Globulin 2.2 gm/dL    A/G Ratio 1.9 g/dL    BUN/Creatinine Ratio 16.2 7.0 - 25.0    Anion Gap 7.0 5.0 - 15.0 mmol/L    eGFR 44.2 (L) >60.0 mL/min/1.73   Lipid Panel With LDL / HDL Ratio    Specimen: Blood   Result Value Ref Range    Total Cholesterol 134 0 - 200 mg/dL    Triglycerides 83 0 - 150 mg/dL    HDL Cholesterol 65 (H) 40 - 60 mg/dL    LDL Cholesterol  53 0 - 100 mg/dL    VLDL Cholesterol 16 5 - 40 mg/dL    LDL/HDL Ratio 0.81    Magnesium    Specimen: Blood   Result Value Ref Range    Magnesium 2.1 1.6 - 2.4 mg/dL           Visit Vitals  /78   Ht 175.3 cm (69\")   Wt 74.8 kg (165 lb)   BMI 24.37 kg/m²     Body mass index is 24.37 kg/m².  /78   Ht 175.3 cm (69\")   Wt 74.8 kg (165 lb)   BMI 24.37 kg/m²       Assessment/Plan   Diagnoses and all orders for this visit:    1. Benign prostatic hyperplasia with lower urinary tract symptoms, symptom details unspecified (Primary)    2. Primary hypertension    3. Mixed hyperlipidemia    4. Medically complex patient    5. Stage 3a chronic kidney disease (HCC)    6. Seizure disorder (HCC)    7. Medicare annual wellness visit, subsequent    8. At high risk for injury related to fall    Is on fall prevention hydration lifestyle measures continue medicines as outlined.  Counseled on ensuring taking all medicines correctly.  Continue walker at all times.  Recheck 6  "

## 2023-01-25 NOTE — PROGRESS NOTES
The ABCs of the Annual Wellness Visit  Subsequent Medicare Wellness Visit    Subjective      Xander Wallace is a 82 y.o. male who presents for a Subsequent Medicare Wellness Visit.    The following portions of the patient's history were reviewed and   updated as appropriate: allergies, current medications, past family history, past medical history, past social history, past surgical history and problem list.    Compared to one year ago, the patient feels his physical   health is the same.    Compared to one year ago, the patient feels his mental   health is the same.    Recent Hospitalizations:  He was not admitted to the hospital during the last year.       Current Medical Providers:  Patient Care Team:  Deni Garcia MD as PCP - General (Family Medicine)  Homero Pearl MD as Surgeon (General Surgery)  Judith Conti MD as Consulting Physician (Gastroenterology)    Outpatient Medications Prior to Visit   Medication Sig Dispense Refill   • albuterol (ACCUNEB) 1.25 MG/3ML nebulizer solution Take 3 mL by nebulization Every 6 (Six) Hours As Needed for Wheezing. 120 each 3   • albuterol sulfate  (90 Base) MCG/ACT inhaler Inhale 2 puffs Every 4 (Four) Hours As Needed for Wheezing. for wheezing 54 g 11   • B Complex-Biotin-FA (SUPER B-COMPLEX) tablet Take  by mouth.     • budesonide-formoterol (SYMBICORT) 160-4.5 MCG/ACT inhaler Inhale 2 puffs 2 (Two) Times a Day.     • cilostazol (PLETAL) 50 MG tablet Take 1 tablet by mouth 2 (Two) Times a Day. 60 tablet 3   • clopidogrel (PLAVIX) 75 MG tablet TAKE ONE TABLET BY MOUTH EVERY DAY 90 tablet 3   • gabapentin (NEURONTIN) 600 MG tablet Take 600 mg by mouth 3 (three) times a day.     • guaiFENesin (Mucinex) 600 MG 12 hr tablet 1 twice daily with large glass water for lungs every day 60 tablet 1   • lamoTRIgine (LaMICtal) 200 MG tablet Take 200 mg by mouth 2 (Two) Times a Day.     • losartan (COZAAR) 25 MG tablet 25 mg Daily.     • Mouthwashes (Biotene  "dry mouth) liquid liquid 2Tablespoon swish gargle and spit hours as needed dry mouth 1000 mL 11   • Multiple Vitamins-Minerals (MULTIVITAMIN WITH MINERALS) tablet tablet Take 1 tablet by mouth Daily.     • Omega-3 Fatty Acids (FISH OIL) 1000 MG capsule capsule Take  by mouth daily with breakfast.     • rosuvastatin (CRESTOR) 20 MG tablet Take 1 tablet by mouth Daily. 90 tablet 3   • tamsulosin (FLOMAX) 0.4 MG capsule 24 hr capsule Take 1 capsule by mouth Every Night. 90 capsule 3   • vitamin C (ASCORBIC ACID) 500 MG tablet Take 500 mg by mouth 2 (two) times a day.       No facility-administered medications prior to visit.       No opioid medication identified on active medication list. I have reviewed chart for other potential  high risk medication/s and harmful drug interactions in the elderly.          Aspirin is not on active medication list.  Aspirin use is not indicated based on review of current medical condition/s. Risk of harm outweighs potential benefits.  .    Patient Active Problem List   Diagnosis   • Seizure disorder (HCC)   • Hypertension   • Coronary artery disease involving coronary bypass graft   • COPD (chronic obstructive pulmonary disease) (HCC)   • Hyperlipidemia   • Peripheral arterial disease (HCC)   • Sleep apnea   • Asthma   • Benign prostatic hyperplasia with lower urinary tract symptoms   • Coronary artery disease   • Cardiac murmur   • Iron deficiency anemia due to chronic blood loss   • Stage 3a chronic kidney disease (HCC)   • Nonrheumatic aortic valve stenosis   • History of aortic valve repair   • Medically complex patient     Advance Care Planning  Advance Directive is not on file.  ACP discussion was held with the patient during this visit. Patient does not have an advance directive, information provided.     Objective    Vitals:    01/25/23 1032   BP: 122/78   Weight: 74.8 kg (165 lb)   Height: 175.3 cm (69\")   PainSc:   5   PainLoc: Leg     Estimated body mass index is 24.37 kg/m² " "as calculated from the following:    Height as of this encounter: 175.3 cm (69\").    Weight as of this encounter: 74.8 kg (165 lb).    BMI is within normal parameters. No other follow-up for BMI required.      Does the patient have evidence of cognitive impairment?   Mild age-related hearing loss and memory    Lab Results   Component Value Date    TRIG 83 01/23/2023    HDL 65 (H) 01/23/2023    LDL 53 01/23/2023    VLDL 16 01/23/2023          HEALTH RISK ASSESSMENT    Smoking Status:  Social History     Tobacco Use   Smoking Status Former   Smokeless Tobacco Never     Alcohol Consumption:  Social History     Substance and Sexual Activity   Alcohol Use No     Fall Risk Screen:    STEADI Fall Risk Assessment was completed, and patient is at MODERATE risk for falls. Assessment completed on:1/25/2023    Depression Screening:  PHQ-2/PHQ-9 Depression Screening 1/25/2023   Little Interest or Pleasure in Doing Things 0-->not at all   Feeling Down, Depressed or Hopeless 0-->not at all   PHQ-9: Brief Depression Severity Measure Score 0       Health Habits and Functional and Cognitive Screening:  Functional & Cognitive Status 1/25/2023   Do you have difficulty preparing food and eating? No   Do you have difficulty bathing yourself, getting dressed or grooming yourself? No   Do you have difficulty using the toilet? No   Do you have difficulty moving around from place to place? Yes   Do you have trouble with steps or getting out of a bed or a chair? Yes   Current Diet Other   Dental Exam Unknown   Eye Exam Up to date   Exercise (times per week) Other   Current Exercises Include Other   Do you need help using the phone?  Yes   Are you deaf or do you have serious difficulty hearing?  Yes   Do you need help with transportation? Yes   Do you need help shopping? No   Do you need help preparing meals?  No   Do you need help with housework?  No   Do you need help with laundry? No   Do you need help taking your medications? No   Do you " need help managing money? No   Do you ever drive or ride in a car without wearing a seat belt? No   Have you felt unusual stress, anger or loneliness in the last month? No   Who do you live with? Spouse   If you need help, do you have trouble finding someone available to you? No   Have you been bothered in the last four weeks by sexual problems? No   Do you have difficulty concentrating, remembering or making decisions? Yes       Age-appropriate Screening Schedule:  Refer to the list below for future screening recommendations based on patient's age, sex and/or medical conditions. Orders for these recommended tests are listed in the plan section. The patient has been provided with a written plan.    Health Maintenance   Topic Date Due   • INFLUENZA VACCINE  03/31/2023 (Originally 8/1/2022)   • LIPID PANEL  01/23/2024   • HEMOGLOBIN A1C  Discontinued   • DIABETIC EYE EXAM  Discontinued   • URINE MICROALBUMIN  Discontinued   • TDAP/TD VACCINES  Discontinued   • ZOSTER VACCINE  Discontinued                CMS Preventative Services Quick Reference  Risk Factors Identified During Encounter:    Fall Risk-High or Moderate: Discussed Fall Prevention in the home  Immunizations Discussed/Encouraged: Influenza and COVID19    The above risks/problems have been discussed with the patient.  Pertinent information has been shared with the patient in the After Visit Summary.    Diagnoses and all orders for this visit:    1. Primary hypertension (Primary)    2. Mixed hyperlipidemia    3. Medically complex patient    4. Stage 3a chronic kidney disease (HCC)    5. Seizure disorder (HCC)    6. Medicare annual wellness visit, subsequent        Follow Up:   Next Medicare Wellness visit to be scheduled in 1 year.      An After Visit Summary and PPPS were made available to the patient.

## 2023-02-09 ENCOUNTER — TELEPHONE (OUTPATIENT)
Dept: CARDIOLOGY | Facility: CLINIC | Age: 83
End: 2023-02-09
Payer: MEDICARE

## 2023-02-09 NOTE — TELEPHONE ENCOUNTER
Afsaneh Martinez Donna L, MA  Phone Number: [phone]     Wife called to get a refill of Clopidigrel sen [facility]    Patient's wife advised refills were sent in November to [facility]. Pharmacy confirmed refills available.

## 2023-04-13 DIAGNOSIS — J41.8 MIXED SIMPLE AND MUCOPURULENT CHRONIC BRONCHITIS: ICD-10-CM

## 2023-04-13 RX ORDER — ALBUTEROL SULFATE 90 UG/1
2 AEROSOL, METERED RESPIRATORY (INHALATION) EVERY 4 HOURS PRN
Qty: 54 G | Refills: 11 | Status: SHIPPED | OUTPATIENT
Start: 2023-04-13

## 2023-04-13 NOTE — TELEPHONE ENCOUNTER
Incoming Refill Request      Medication requested (name and dose):     Albuterol Inhaler      Pharmacy where request should be sent:     [facility]    Additional details provided by patient:      Best call back number:     [phone]    Does the patient have less than a 3 day supply:  [x] Yes  [] No    Maria Luisa Chambers Rep  04/13/23, 09:55 CDT

## 2023-05-12 ENCOUNTER — OFFICE VISIT (OUTPATIENT)
Dept: FAMILY MEDICINE CLINIC | Facility: CLINIC | Age: 83
End: 2023-05-12
Payer: MEDICARE

## 2023-05-12 VITALS
SYSTOLIC BLOOD PRESSURE: 122 MMHG | HEIGHT: 69 IN | WEIGHT: 161.6 LBS | BODY MASS INDEX: 23.93 KG/M2 | HEART RATE: 71 BPM | DIASTOLIC BLOOD PRESSURE: 66 MMHG | OXYGEN SATURATION: 97 %

## 2023-05-12 DIAGNOSIS — J45.40 MODERATE PERSISTENT ASTHMA, UNSPECIFIED WHETHER COMPLICATED: Chronic | ICD-10-CM
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
## 2023-05-12 DIAGNOSIS — Z78.9 MEDICALLY COMPLEX PATIENT: Chronic | ICD-10-CM

## 2023-05-12 DIAGNOSIS — M25.551 HIP PAIN, RIGHT: ICD-10-CM

## 2023-05-12 DIAGNOSIS — Z91.81 AT HIGH RISK FOR INJURY RELATED TO FALL: Chronic | ICD-10-CM

## 2023-05-12 RX ORDER — ALBUTEROL SULFATE 1.25 MG/3ML
1 SOLUTION RESPIRATORY (INHALATION) EVERY 6 HOURS PRN
Qty: 120 EACH | Refills: 3 | Status: SHIPPED | OUTPATIENT
Start: 2023-05-12

## 2023-05-12 NOTE — PROGRESS NOTES
Subjective   Xander Wallace is a 82 y.o. male.  ER follow-up.  Unfortunately once again there are no records from treating hospital.  We have had multiple attempts to try to get.  Apparently fell last week or week before injuring hip on the right.  X-rays were apparently negative was sent home conservative therapy.  Some inclination from family who are also very poor historians in regards to possible need for physical therapy.  Also some suggestion from patient's neurologist in regards to same however once again we have absolutely no records    History of Present Illness   HPI    The following portions of the patient's history were reviewed and updated as appropriate: allergies, current medications, past family history, past medical history, past social history, past surgical history and problem list.    Review of Systems  Review of Systems   Constitutional: Negative for activity change, appetite change, fatigue and unexpected weight change.   HENT: Negative for trouble swallowing and voice change.    Eyes: Negative for redness and visual disturbance.   Respiratory: Negative for cough and wheezing.    Cardiovascular: Negative for chest pain and palpitations.   Gastrointestinal: Negative for abdominal pain, constipation, diarrhea, nausea and vomiting.   Genitourinary: Negative for urgency.   Musculoskeletal: Positive for arthralgias, back pain and gait problem. Negative for joint swelling.   Neurological: Negative for syncope and headaches.   Hematological: Negative for adenopathy.   Psychiatric/Behavioral: Positive for decreased concentration. Negative for sleep disturbance.       Objective   Physical Exam  Physical Exam  Constitutional:       Appearance: He is well-developed.   HENT:      Head: Normocephalic.   Eyes:      Pupils: Pupils are equal, round, and reactive to light.   Neck:      Thyroid: No thyromegaly.   Cardiovascular:      Rate and Rhythm: Normal rate and regular rhythm.      Heart sounds:  "Normal heart sounds. No murmur heard.    No friction rub. No gallop.   Pulmonary:      Breath sounds: Normal breath sounds.   Abdominal:      General: There is no distension.      Palpations: Abdomen is soft. There is no mass.      Tenderness: There is no abdominal tenderness.   Musculoskeletal:         General: Normal range of motion.      Cervical back: Normal range of motion.      Comments: Get up and go 3 to 5 seconds able to stand independently but walk with rollator.  Poor core muscle strength.  Hips are minimally tender bilaterally but normal range of motion.  Negative straight leg raise bilaterally.   Skin:     General: Skin is warm and dry.   Neurological:      Mental Status: He is alert and oriented to person, place, and time.      Deep Tendon Reflexes: Reflexes are normal and symmetric.   Psychiatric:         Mood and Affect: Mood normal.         Speech: Speech is delayed.         Behavior: Behavior is slowed.         Cognition and Memory: He exhibits impaired recent memory.           Visit Vitals  /66   Pulse 71   Ht 175.3 cm (69\")   Wt 73.3 kg (161 lb 9.6 oz)   SpO2 97%   BMI 23.86 kg/m²     Body mass index is 23.86 kg/m².  /66   Pulse 71   Ht 175.3 cm (69\")   Wt 73.3 kg (161 lb 9.6 oz)   SpO2 97%   BMI 23.86 kg/m²       Assessment/Plan   Diagnoses and all orders for this visit:    1. Fall, subsequent encounter (Primary)  -     Ambulatory Referral to Physical Therapy Evaluate and treat    2. Hip pain, right  -     Ambulatory Referral to Physical Therapy Evaluate and treat    3. At high risk for injury related to fall  -     Ambulatory Referral to Physical Therapy Evaluate and treat    4. Medically complex patient    5. Moderate persistent asthma, unspecified whether complicated  -     Budeson-Glycopyrrol-Formoterol (BREZTRI) 160-9-4.8 MCG/ACT aerosol inhaler; Inhale 2 puffs 2 (Two) Times a Day.  Dispense: 3 g; Refill: 3  -     albuterol (ACCUNEB) 1.25 MG/3ML nebulizer solution; Take 3 " mL by nebulization Every 6 (Six) Hours As Needed for Wheezing.  Dispense: 120 each; Refill: 3    Refill inhalers at request made referral for physical therapy locally.  Once again with working on very little information.  Keep    Follow

## 2023-07-25 ENCOUNTER — OFFICE VISIT (OUTPATIENT)
Dept: FAMILY MEDICINE CLINIC | Facility: CLINIC | Age: 83
End: 2023-07-25
Payer: MEDICARE

## 2023-07-25 VITALS
BODY MASS INDEX: 23.25 KG/M2 | OXYGEN SATURATION: 98 % | WEIGHT: 157 LBS | DIASTOLIC BLOOD PRESSURE: 82 MMHG | HEART RATE: 60 BPM | HEIGHT: 69 IN | SYSTOLIC BLOOD PRESSURE: 131 MMHG

## 2023-07-25 DIAGNOSIS — N18.31 STAGE 3A CHRONIC KIDNEY DISEASE: Chronic | ICD-10-CM

## 2023-07-25 DIAGNOSIS — Z91.81 AT HIGH RISK FOR INJURY RELATED TO FALL: Chronic | ICD-10-CM

## 2023-07-25 DIAGNOSIS — D50.0 IRON DEFICIENCY ANEMIA DUE TO CHRONIC BLOOD LOSS: Chronic | ICD-10-CM

## 2023-07-25 DIAGNOSIS — I10 PRIMARY HYPERTENSION: Primary | Chronic | ICD-10-CM

## 2023-07-25 DIAGNOSIS — E78.2 MIXED HYPERLIPIDEMIA: Chronic | ICD-10-CM

## 2023-07-25 DIAGNOSIS — I25.708 CORONARY ARTERY DISEASE INVOLVING CORONARY BYPASS GRAFT OF NATIVE HEART WITH OTHER FORMS OF ANGINA PECTORIS: ICD-10-CM

## 2023-07-25 DIAGNOSIS — J41.8 MIXED SIMPLE AND MUCOPURULENT CHRONIC BRONCHITIS: Chronic | ICD-10-CM

## 2023-07-25 PROCEDURE — 3075F SYST BP GE 130 - 139MM HG: CPT | Performed by: FAMILY MEDICINE

## 2023-07-25 PROCEDURE — 3079F DIAST BP 80-89 MM HG: CPT | Performed by: FAMILY MEDICINE

## 2023-07-25 PROCEDURE — 99214 OFFICE O/P EST MOD 30 MIN: CPT | Performed by: FAMILY MEDICINE

## 2023-07-25 NOTE — PROGRESS NOTES
Subjective   Xandercarrol Wallace is a 83 y.o. male.  Reevaluation of below diagnoses hypertension hyperlipidemia COPD seizure disorder mild cardiovascular disease.  In the interim has done remarkably well still remains active still tries to work around the house every day.  Does use a walker at all times.  Has not fallen.  Is up on eye exam.  Last creatinine holding steady at 1.5.  Only change in medication is decreasing gabapentin from his neurologist.  Overall stable.    History of Present Illness   HPI    The following portions of the patient's history were reviewed and updated as appropriate: allergies, current medications, past family history, past medical history, past social history, past surgical history, and problem list.    Review of Systems  Review of Systems   Constitutional:  Negative for activity change, appetite change, fatigue and unexpected weight change.   HENT:  Positive for hearing loss (Chronic). Negative for trouble swallowing and voice change.    Eyes:  Negative for redness and visual disturbance.   Respiratory:  Negative for cough and wheezing.    Cardiovascular:  Negative for chest pain and palpitations.   Gastrointestinal:  Negative for abdominal pain, constipation, diarrhea, nausea and vomiting.   Genitourinary:  Negative for urgency.   Musculoskeletal:  Negative for joint swelling.   Neurological:  Negative for syncope and headaches.   Hematological:  Negative for adenopathy.   Psychiatric/Behavioral:  Negative for sleep disturbance.      Objective   Physical Exam  Physical Exam  Constitutional:       Appearance: He is well-developed.   Eyes:      Pupils: Pupils are equal, round, and reactive to light.   Cardiovascular:      Rate and Rhythm: Normal rate and regular rhythm.      Heart sounds: Normal heart sounds. No murmur heard.    No friction rub. No gallop.   Pulmonary:      Breath sounds: Normal breath sounds.   Abdominal:      Palpations: Abdomen is soft.   Musculoskeletal:         " General: Normal range of motion.      Cervical back: Normal range of motion.      Right foot: Normal range of motion. No deformity.      Left foot: Normal range of motion. Deformity present.      Comments: No peripheral edema   Feet:      Right foot:      Protective Sensation:   10 sites sensed.      Skin integrity: Skin breakdown present. No ulcer, blister or erythema.      Left foot:      Protective Sensation:   10 sites sensed.      Skin integrity: No ulcer, blister, skin breakdown, erythema, warmth, callus or dry skin.   Skin:     General: Skin is warm and dry.      Findings: No abrasion or rash.      Comments: [place] tanned counseled on same   Neurological:      Deep Tendon Reflexes: Reflexes are normal and symmetric.   Psychiatric:         Mood and Affect: Mood normal.         Speech: Speech normal.         Behavior: Behavior normal.         Cognition and Memory: He exhibits impaired recent memory (Minimal age-related).         Visit Vitals  /82   Pulse 60   Ht 175.3 cm (69\")   Wt 71.2 kg (157 lb)   SpO2 98%   BMI 23.18 kg/m²     Body mass index is 23.18 kg/m².  /82   Pulse 60   Ht 175.3 cm (69\")   Wt 71.2 kg (157 lb)   SpO2 98%   BMI 23.18 kg/m²       Assessment/Plan   Diagnoses and all orders for this visit:    1. Primary hypertension (Primary)  -     Comprehensive Metabolic Panel; Future  -     Magnesium; Future    2. At high risk for injury related to fall    3. Mixed hyperlipidemia  -     Lipid Panel With LDL / HDL Ratio; Future    4. Stage 3a chronic kidney disease  -     CBC (No Diff); Future  -     Comprehensive Metabolic Panel; Future  -     Magnesium; Future    5. Iron deficiency anemia due to chronic blood loss    6. Mixed simple and mucopurulent chronic bronchitis    7. Coronary artery disease involving coronary bypass graft of native heart with other forms of angina pectoris    Counseled on fall prevention as always.  Counseled following up with all subspecialist.  Counseled on " flu vaccine COVID booster in the fall.  Continue medicines as outlined recheck 6 months lab prior will also be time for subsequent Medicare

## 2023-09-25 ENCOUNTER — OFFICE VISIT (OUTPATIENT)
Dept: CARDIOLOGY | Facility: CLINIC | Age: 83
End: 2023-09-25

## 2023-09-25 VITALS
OXYGEN SATURATION: 98 % | HEART RATE: 61 BPM | WEIGHT: 156.2 LBS | DIASTOLIC BLOOD PRESSURE: 70 MMHG | HEIGHT: 69 IN | SYSTOLIC BLOOD PRESSURE: 138 MMHG | BODY MASS INDEX: 23.13 KG/M2

## 2023-09-25 DIAGNOSIS — Z98.890 HISTORY OF AORTIC VALVE REPAIR: Primary | Chronic | ICD-10-CM

## 2023-09-25 DIAGNOSIS — Z86.79 HISTORY OF AORTIC VALVE REPAIR: Primary | Chronic | ICD-10-CM

## 2023-09-25 LAB
QT INTERVAL: 448 MS
QTC INTERVAL: 450 MS

## 2023-09-25 PROCEDURE — 3078F DIAST BP <80 MM HG: CPT | Performed by: INTERNAL MEDICINE

## 2023-09-25 PROCEDURE — 1160F RVW MEDS BY RX/DR IN RCRD: CPT | Performed by: INTERNAL MEDICINE

## 2023-09-25 PROCEDURE — 93000 ELECTROCARDIOGRAM COMPLETE: CPT | Performed by: INTERNAL MEDICINE

## 2023-09-25 PROCEDURE — 3075F SYST BP GE 130 - 139MM HG: CPT | Performed by: INTERNAL MEDICINE

## 2023-09-25 PROCEDURE — 99214 OFFICE O/P EST MOD 30 MIN: CPT | Performed by: INTERNAL MEDICINE

## 2023-09-25 PROCEDURE — 1159F MED LIST DOCD IN RCRD: CPT | Performed by: INTERNAL MEDICINE

## 2023-09-25 RX ORDER — AMLODIPINE BESYLATE 5 MG/1
1 TABLET ORAL DAILY
COMMUNITY
Start: 2023-09-05

## 2023-09-25 NOTE — PROGRESS NOTES
[facility] Cardiology  OFFICE NOTE    Cardiovascular Medicine  Mike Montes M.D., RPVI         No referring provider defined for this encounter.      This is a 83 y.o. male with:       12/18/2018  1. Coronary artery disease involving coronary bypass graft of native heart without angina pectoris    2. Chronic renal insufficiency, stage 2 (mild)    3. Mixed hyperlipidemia    4. Peripheral arterial disease (CMS/HCC)    5. Essential hypertension    6.  Aortic stenosis status post TAVR     Chief complaint -Follow-up CAD        History of present Illness- 83 y.o.yr old gentleman with history of coronary disease prior CABG with SVG to diagonal, LIMA to LAD, subsequent PCI to his left main left circumflex by Dr. Otero.  In the past and it has history of peripheral vascular disease with total occlusion of the SFA.  Patient had failed attempted PTA of the right SFA with antegrade retrograde approach. He has claudication pain With walking  2-3 blocks, but no rest pain but does not want to do any PTA at this time and he will continue to walk as much as he can.      Patient was found to have aortic stenosis on echocardiogram in December.  Repeat cardiac cath in March showed patent LIMA to LAD and SVG to diagonal.  Has stents in the left main left circumflex are widely patent.  He was referred for TAVR to [place].    Underwent TAVR with a 26 mm S3 valve via femoral cutdown without any complications.  Repeat echocardiogram post TAVR showed no formal functioning valve with a mean gradient of 11 mmHg with a peak gradient of 20 mmHg.    He was slightly bradycardic so was taken off his Coreg  Continue to have claudications on right side  Reported significant imrovement in his symptoms post TAVR    Patient was seen by Dr. Bustamante recently, was found to have bradycardia with heart rate in the 40s the patient was referred here for further evaluation.    09/25/2023:  No acute issues since last visit.  Denying afib  and claudications.  No chest pain shortness of breath or palpitations.    07/11/2022:  Has done well since last visit but denying any chest pain.  Shortness of breath is at baseline.  No syncope or lightheadedness.        Review of Systems - ROS  Constitution: Negative for weakness, weight gain and weight loss.   HENT: Negative for congestion.    Eyes: Negative for blurred vision.   Cardiovascular: As mentioned above  Respiratory: Negative for cough and hemoptysis.    Endocrine: Negative for polydipsia and polyuria.   Hematologic/Lymphatic: Negative for bleeding problem. Does not bruise/bleed easily.   Skin: Negative for flushing.   Musculoskeletal: Negative for neck pain and stiffness.   Gastrointestinal: Negative for abdominal pain, diarrhea, jaundice, melena, nausea and vomiting.   Genitourinary: Negative for dysuria and hematuria.   Neurological: Negative for dizziness, focal weakness and numbness.   Psychiatric/Behavioral: Negative for altered mental status and depression.     I reviewed the ROS as documented here and confirmed the accuracy of it with the patient today. 9/25/2023        All other systems were reviewed and were negative.    family history includes Cancer in his father.     reports that he has quit smoking. He has never used smokeless tobacco. He reports that he does not drink alcohol and does not use drugs.    Allergies   Allergen Reactions    Doxycycline GI Intolerance    Elemental Sulfur GI Intolerance     Stomach issues and rash     Methylphenidate Derivatives     Theophyllines Unknown - Low Severity     Pt states he didn't remember         Amoxicillin Rash    Ceftin [Cefuroxime] Rash    Nystatin Rash         Current Outpatient Medications:     albuterol (ACCUNEB) 1.25 MG/3ML nebulizer solution, Take 3 mL by nebulization Every 6 (Six) Hours As Needed for Wheezing., Disp: 120 each, Rfl: 3    albuterol sulfate  (90 Base) MCG/ACT inhaler, Inhale 2 puffs Every 4 (Four) Hours As Needed for  "Wheezing. for wheezing, Disp: 54 g, Rfl: 11    amLODIPine (NORVASC) 5 MG tablet, Take 1 tablet by mouth Daily., Disp: , Rfl:     Budeson-Glycopyrrol-Formoterol (BREZTRI) 160-9-4.8 MCG/ACT aerosol inhaler, Inhale 2 puffs 2 (Two) Times a Day., Disp: 3 g, Rfl: 3    clopidogrel (PLAVIX) 75 MG tablet, TAKE ONE TABLET BY MOUTH EVERY DAY, Disp: 90 tablet, Rfl: 3    gabapentin (NEURONTIN) 600 MG tablet, Take 0.5 tablets by mouth 2 (Two) Times a Day., Disp: , Rfl:     lamoTRIgine (LaMICtal) 200 MG tablet, Take 1 tablet by mouth 2 (Two) Times a Day., Disp: , Rfl:     Mouthwashes (Biotene dry mouth) liquid liquid, 2Tablespoon swish gargle and spit hours as needed dry mouth, Disp: 1000 mL, Rfl: 11    rosuvastatin (CRESTOR) 20 MG tablet, Take 1 tablet by mouth Daily., Disp: 90 tablet, Rfl: 3    tamsulosin (FLOMAX) 0.4 MG capsule 24 hr capsule, Take 1 capsule by mouth Every Night., Disp: 90 capsule, Rfl: 3    B Complex-Biotin-FA (SUPER B-COMPLEX) tablet, Take  by mouth. (Patient not taking: Reported on 9/25/2023), Disp: , Rfl:     losartan (COZAAR) 25 MG tablet, 1 tablet Daily. (Patient not taking: Reported on 9/25/2023), Disp: , Rfl:     Multiple Vitamins-Minerals (MULTIVITAMIN WITH MINERALS) tablet tablet, Take 1 tablet by mouth Daily. (Patient not taking: Reported on 9/25/2023), Disp: , Rfl:     Omega-3 Fatty Acids (FISH OIL) 1000 MG capsule capsule, Take  by mouth daily with breakfast. (Patient not taking: Reported on 9/25/2023), Disp: , Rfl:     vitamin C (ASCORBIC ACID) 500 MG tablet, Take 1 tablet by mouth 2 (two) times a day. (Patient not taking: Reported on 9/25/2023), Disp: , Rfl:     Physical Exam:  Vitals:    09/25/23 1413   BP: 138/70   BP Location: Left arm   Patient Position: Sitting   Cuff Size: Adult   Pulse: 61   SpO2: 98%   Weight: 70.9 kg (156 lb 3.2 oz)   Height: 175.3 cm (69\")   PainSc: 0-No pain   PainLoc: Chest     Current Pain Level: none  Pulse Ox: Normal        on room air  General: alert, appears " stated age and cooperative     Body Habitus: well-nourished    HEENT: Head: Normocephalic, no lesions, without obvious abnormality. No arcus senilis, xanthelasma or xanthomas.    Neuro: alert, oriented x3  Pulses: 2+ and symmetric  JVP: Volume/Pulsation:       Normal.  Normal waveforms.   Appropriate inspiratory decrease.  No Kussmaul's. No Rupal's.   Carotid Exam: no bruit normal pulsation bilaterally    Carotid Volume: normal.                     Respirations: no increased work of breathing            Chest:  Normal              Pulmonary:Normal       Precordium: Normal impulses. P2 is not palpable.  RV Heave: absent  LV Heave: absent  [place]:  normal size and placement  Palpable S4: absent.  Heart rate: normal    Heart Rhythm: regular             Systolic murmur audible in aortic area                         Heart Sounds: S1: normal    S2: normal  S3: absent                               S4: absent  Ejection systolic murmur audible.    Pericardial Rub:  Absent: .                 Abdomen:   Appearance: normal .  Palpation: Soft, non-tender to palpation, bowel sounds positive in all four quadrants; no guarding or rebound tenderness  Extremity: no edema.   LE Skin: no rashes  LE Hair:  normal  LE Pulses: well perfused with normal pulses in the distal extremities  Pallor on elevation: Absent. Rubor on dependency: None     I have reexamined the patient and the results are consistent with the previously documented exam. Mike Montes MD       DATA REVIEWED:     EKG. I personally reviewed and interpreted the EKG.  Sinus rhythm with nonspecific ST-T changes.      ECG/EMG Results (all)       None          ---------------------------------------------------  TTE/ADAIR:  Results for orders placed in visit on 06/22/20    Adult Transthoracic Echo Complete W/ Cont if Necessary Per Protocol    Interpretation Summary  · Left ventricular ejection fraction appears to be 56 - 60%. Left ventricular systolic function is normal.  ·  Left ventricular diastolic function is consistent with (grade II w/high LAP) pseudonormalization.  · Left ventricular wall thickness is consistent with concentric hypertrophy.  · Estimated right ventricular systolic pressure from tricuspid regurgitation is normal (<35 mmHg).  · Left atrial volume is moderately increased.  · Moderate mitral annular calcification is present. Mild mitral valve regurgitation is present. Mild mitral valve stenosis is present with functional MAC.  · The aortic valve is abnormal in structure. There is moderate calcification of the aortic valve mainly affecting the non-coronary, left coronary and right coronary cusp(s). The aortic valve appears trileaflet. No aortic valve regurgitation is present. Moderate restriction to aortic valve opening. Aortic valve peak/mean gradient of 76/42mmHg. SHAHIDA of 0.64cm2 by continuity findings c/w severe aortic stenosis.        --------------------------------------------------------------------------------------------------  LABS:     The CVD Risk score (JEANETTE'Agostino, et al., 2008) failed to calculate for the following reasons:    The 2008 CVD risk score is only valid for ages 30 to 74    The patient has a prior MI, stroke, CHF, or peripheral vascular disease diagnosis         Lab Results   Component Value Date    GLUCOSE 81 01/23/2023    BUN 25 (H) 01/23/2023    CREATININE 1.54 (H) 01/23/2023    EGFRIFNONA 56 (L) 10/18/2021    BCR 16.2 01/23/2023    K 4.5 01/23/2023    CO2 28.0 01/23/2023    CALCIUM 9.3 01/23/2023    ALBUMIN 4.2 01/23/2023    AST 20 01/23/2023    ALT 13 01/23/2023     Lab Results   Component Value Date    WBC 5.58 01/23/2023    HGB 11.8 (L) 01/23/2023    HCT 35.3 (L) 01/23/2023    MCV 89.8 01/23/2023     01/23/2023     Lab Results   Component Value Date    CHOL 134 01/23/2023    CHLPL 114 05/04/2016    TRIG 83 01/23/2023    HDL 65 (H) 01/23/2023    LDL 53 01/23/2023     Lab Results   Component Value Date    TSH 1.610 11/05/2019     Lab  Results   Component Value Date    CKTOTAL 69 02/22/2016    CKMB 2.8 02/22/2016    TROPONINI 1.350 (H) 02/24/2016     Lab Results   Component Value Date    HGBA1C 5.3 05/04/2016     No results found for: DDIMER  Lab Results   Component Value Date    ALT 13 01/23/2023     Lab Results   Component Value Date    HGBA1C 5.3 05/04/2016     Lab Results   Component Value Date    CREATININE 1.54 (H) 01/23/2023     Lab Results   Component Value Date    IRON 16 (L) 11/05/2019    TIBC 402 11/05/2019     Lab Results   Component Value Date    INR 1.01 03/24/2021    INR 1.03 11/05/2019    INR 0.9 08/23/2016    PROTIME 13.7 03/24/2021    PROTIME 13.3 11/05/2019    PROTIME 12.4 08/23/2016       Assessment/Plan     1. CAD status post CABG in the past, doing well no chest pain ,continue the Plavix as he is bruising easily.  He is on plavix/statin  Cardiac cath prior to his TAVR showed patent LIMA to LAD SVG diagonal  Stents in ostial circumflex were patent.    2. Peripheral vascular disease-status post occlusion of the SFA has no critical limb ischemia, still walking but does have claudication   We will add cilostazol.  I discussed options of relook at his SFA for endovascular intervention versus surgical intervention.  Patient did not want to proceed with any invasive evaluation this time.     3. Hypertension was well controlled with the losartan 25 mg.  His carvedilol was stopped because of sinus bradycardia.  Blood pressure is elevated in office today.  His losartan has been stopped and he is on amlodipine.    4. COPD with wheezing on Atrovent and Dulera .      5.  Severe Aortic stenosis:  Status post TAVR with 26 mm S3 valve via femoral cutdown.  Echocardiogram post TAVR with minimal gradients.    6.  Sinus bradycardia: Solved  He has known sinus bradycardia, his carvedilol has already been stopped.  Previous TSH was normal.  He is asymptomatic.    Heart monitor was without any evidence of high degree AV block's.  He did have  frequent PVCs with which she was symptomatic from bigeminy.          Prevention:  Patient's Body mass index is 23.07 kg/m². BMI is above normal parameters. Recommendations include: exercise counseling.      Xanderlong Wallace is an ex smoker    AAA Screening:   Not needed            This document has been electronically signed by Mike Montes MD on September 25, 2023 14:18 CDT

## 2024-05-06 NOTE — TELEPHONE ENCOUNTER
----- Message from Gail Alfaro sent at 9/21/2020  9:02 AM CDT -----  To Ronald on [address] he needs rosuvastatin called in     Thanks    Gail      electronic

## (undated) DEVICE — ELECTRODE,RT,STRESS,FOAM,50PK: Brand: MEDLINE

## (undated) DEVICE — CANN SMPL SOFTECH BIFLO ETCO2 A/M 7FT

## (undated) DEVICE — INTRO SHEATH ART/FEM ENGAGE .038 6F12CM

## (undated) DEVICE — CATH DIAG EXPO .056 IM 6F 100CM

## (undated) DEVICE — GW PERIPH GUIDERIGHT STD/EXCHNG/J/TIP SS 0.038IN 5X260CM

## (undated) DEVICE — ST CVR PROB PULLUP ULTRASND 5X48IN

## (undated) DEVICE — PK CATH LAB 60

## (undated) DEVICE — X-DRAPE ABS 12"X17" .25MM LEAD EQUIV STERILE X-RAY SHIELD 10/BOX: Brand: X-DRAPE

## (undated) DEVICE — GW PERIPH GUIDERIGHT STD/J/TP PTFE/PCOAT SS 0.038IN 5X150CM

## (undated) DEVICE — GW PTFE EMERALD HC J TIP STD .025 3MM 150CM

## (undated) DEVICE — CATH THERMODIL SWANGANZ 4LUM 6F 110CM

## (undated) DEVICE — CATH DIAG EXPO M/ PK 6FR FL4/FR4 PIG 3PK

## (undated) DEVICE — A2000 MULTI-USE SYRINGE KIT, P/N 701277-003KIT CONTENTS: 100ML CONTRAST RESERVOIR AND TUBING WITH CONTRAST SPIKE AND CLAMP: Brand: A2000 MULTI-USE SYRINGE KIT

## (undated) DEVICE — MODEL BT2000 P/N 700287-012KIT CONTENTS: MANIFOLD WITH SALINE AND CONTRAST PORTS, SALINE TUBING WITH SPIKE AND HAND SYRINGE, TRANSDUCER: Brand: BT2000 AUTOMATED MANIFOLD KIT

## (undated) DEVICE — TRAP,MUCUS SPECIMEN,40CC: Brand: MEDLINE

## (undated) DEVICE — KT INTRO MINISTICK MAX W/GW NITNL/TUNG ECHO 4F 21G 7CM

## (undated) DEVICE — MYNXGRIP 6F/7F: Brand: MYNXGRIP

## (undated) DEVICE — SINGLE-USE BIOPSY FORCEPS: Brand: RADIAL JAW 4

## (undated) DEVICE — Device: Brand: DISPOSABLE ELECTROSURGICAL SNARE

## (undated) DEVICE — BITEBLOCK ENDO W/STRAP 60F A/ LF DISP